# Patient Record
Sex: FEMALE | Race: WHITE | NOT HISPANIC OR LATINO | Employment: OTHER | URBAN - METROPOLITAN AREA
[De-identification: names, ages, dates, MRNs, and addresses within clinical notes are randomized per-mention and may not be internally consistent; named-entity substitution may affect disease eponyms.]

---

## 2020-04-13 LAB
HPV APTIMA: NEGATIVE
PAP RECOMMENDATION EXT: NORMAL
PAP SMEAR: NORMAL

## 2022-09-18 ENCOUNTER — OFFICE VISIT (OUTPATIENT)
Dept: URGENT CARE | Facility: CLINIC | Age: 62
End: 2022-09-18
Payer: COMMERCIAL

## 2022-09-18 VITALS
HEIGHT: 63 IN | TEMPERATURE: 98 F | RESPIRATION RATE: 18 BRPM | WEIGHT: 165 LBS | OXYGEN SATURATION: 97 % | BODY MASS INDEX: 29.23 KG/M2 | HEART RATE: 82 BPM | DIASTOLIC BLOOD PRESSURE: 93 MMHG | SYSTOLIC BLOOD PRESSURE: 160 MMHG

## 2022-09-18 DIAGNOSIS — R31.9 HEMATURIA, UNSPECIFIED TYPE: Primary | ICD-10-CM

## 2022-09-18 LAB
APPEARANCE UR: ABNORMAL
BACTERIA #/AREA URNS AUTO: ABNORMAL /HPF
BILIRUB UR QL STRIP.AUTO: NEGATIVE MG/DL
BILIRUB UR QL STRIP: NEGATIVE
COLOR UR AUTO: ABNORMAL
GLUCOSE UR QL STRIP.AUTO: NEGATIVE MG/DL
GLUCOSE UR QL STRIP: NEGATIVE
KETONES UR QL STRIP.AUTO: NEGATIVE MG/DL
KETONES UR QL STRIP: NEGATIVE
LEUKOCYTE ESTERASE UR QL STRIP.AUTO: ABNORMAL UNIT/L
LEUKOCYTE ESTERASE UR QL STRIP: POSITIVE
NITRITE UR QL STRIP.AUTO: NEGATIVE
PH UR STRIP.AUTO: 5.5 [PH]
PH, POC UA: 5
POC BLOOD, URINE: POSITIVE
POC NITRATES, URINE: NEGATIVE
PROT UR QL STRIP.AUTO: ABNORMAL MG/DL
PROT UR QL STRIP: POSITIVE
RBC #/AREA URNS AUTO: 500 /HPF
RBC UR QL AUTO: ABNORMAL UNIT/L
SP GR UR STRIP.AUTO: 1.02 (ref 1–1.03)
SP GR UR STRIP: 1.02 (ref 1–1.03)
SQUAMOUS #/AREA URNS AUTO: <5 /HPF
URATE CRY URNS QL MICRO: ABNORMAL /HPF
UROBILINOGEN UR STRIP-ACNC: 0.2 MG/DL
UROBILINOGEN UR STRIP-ACNC: 1 (ref 0.1–1.1)
WBC #/AREA URNS AUTO: 6 /HPF

## 2022-09-18 PROCEDURE — 99203 OFFICE O/P NEW LOW 30 MIN: CPT | Mod: ,,, | Performed by: PHYSICIAN ASSISTANT

## 2022-09-18 PROCEDURE — 3080F PR MOST RECENT DIASTOLIC BLOOD PRESSURE >= 90 MM HG: ICD-10-PCS | Mod: CPTII,,, | Performed by: PHYSICIAN ASSISTANT

## 2022-09-18 PROCEDURE — 1160F PR REVIEW ALL MEDS BY PRESCRIBER/CLIN PHARMACIST DOCUMENTED: ICD-10-PCS | Mod: CPTII,,, | Performed by: PHYSICIAN ASSISTANT

## 2022-09-18 PROCEDURE — 1159F PR MEDICATION LIST DOCUMENTED IN MEDICAL RECORD: ICD-10-PCS | Mod: CPTII,,, | Performed by: PHYSICIAN ASSISTANT

## 2022-09-18 PROCEDURE — 3008F BODY MASS INDEX DOCD: CPT | Mod: CPTII,,, | Performed by: PHYSICIAN ASSISTANT

## 2022-09-18 PROCEDURE — 1159F MED LIST DOCD IN RCRD: CPT | Mod: CPTII,,, | Performed by: PHYSICIAN ASSISTANT

## 2022-09-18 PROCEDURE — 3077F SYST BP >= 140 MM HG: CPT | Mod: CPTII,,, | Performed by: PHYSICIAN ASSISTANT

## 2022-09-18 PROCEDURE — 81003 POCT URINALYSIS, DIPSTICK, MANUAL, W/O SCOPE: ICD-10-PCS | Mod: QW,,, | Performed by: PHYSICIAN ASSISTANT

## 2022-09-18 PROCEDURE — 4010F ACE/ARB THERAPY RXD/TAKEN: CPT | Mod: CPTII,,, | Performed by: PHYSICIAN ASSISTANT

## 2022-09-18 PROCEDURE — 3077F PR MOST RECENT SYSTOLIC BLOOD PRESSURE >= 140 MM HG: ICD-10-PCS | Mod: CPTII,,, | Performed by: PHYSICIAN ASSISTANT

## 2022-09-18 PROCEDURE — 81003 URINALYSIS AUTO W/O SCOPE: CPT | Mod: QW,,, | Performed by: PHYSICIAN ASSISTANT

## 2022-09-18 PROCEDURE — 99203 PR OFFICE/OUTPT VISIT, NEW, LEVL III, 30-44 MIN: ICD-10-PCS | Mod: ,,, | Performed by: PHYSICIAN ASSISTANT

## 2022-09-18 PROCEDURE — 4010F PR ACE/ARB THEARPY RXD/TAKEN: ICD-10-PCS | Mod: CPTII,,, | Performed by: PHYSICIAN ASSISTANT

## 2022-09-18 PROCEDURE — 1160F RVW MEDS BY RX/DR IN RCRD: CPT | Mod: CPTII,,, | Performed by: PHYSICIAN ASSISTANT

## 2022-09-18 PROCEDURE — 3080F DIAST BP >= 90 MM HG: CPT | Mod: CPTII,,, | Performed by: PHYSICIAN ASSISTANT

## 2022-09-18 PROCEDURE — 3008F PR BODY MASS INDEX (BMI) DOCUMENTED: ICD-10-PCS | Mod: CPTII,,, | Performed by: PHYSICIAN ASSISTANT

## 2022-09-18 RX ORDER — OMEPRAZOLE 20 MG/1
20 CAPSULE, DELAYED RELEASE ORAL DAILY
COMMUNITY
Start: 2022-06-19 | End: 2023-01-18 | Stop reason: SDUPTHER

## 2022-09-18 RX ORDER — TAMSULOSIN HYDROCHLORIDE 0.4 MG/1
0.4 CAPSULE ORAL DAILY
Qty: 14 CAPSULE | Refills: 0 | Status: SHIPPED | OUTPATIENT
Start: 2022-09-18 | End: 2022-09-18

## 2022-09-18 RX ORDER — ATORVASTATIN CALCIUM 40 MG/1
40 TABLET, FILM COATED ORAL DAILY
COMMUNITY
Start: 2022-08-09 | End: 2023-01-09 | Stop reason: SDUPTHER

## 2022-09-18 RX ORDER — NITROFURANTOIN 25; 75 MG/1; MG/1
100 CAPSULE ORAL 2 TIMES DAILY
Qty: 20 CAPSULE | Refills: 0 | Status: SHIPPED | OUTPATIENT
Start: 2022-09-18 | End: 2022-09-28

## 2022-09-18 RX ORDER — KETOROLAC TROMETHAMINE 10 MG/1
10 TABLET, FILM COATED ORAL EVERY 6 HOURS PRN
Qty: 12 TABLET | Refills: 0 | Status: SHIPPED | OUTPATIENT
Start: 2022-09-18 | End: 2022-09-21

## 2022-09-18 RX ORDER — LOSARTAN POTASSIUM 50 MG/1
50 TABLET ORAL DAILY
COMMUNITY
Start: 2022-07-08 | End: 2022-12-08

## 2022-09-18 RX ORDER — KETOROLAC TROMETHAMINE 10 MG/1
10 TABLET, FILM COATED ORAL EVERY 6 HOURS PRN
Qty: 12 TABLET | Refills: 0 | Status: SHIPPED | OUTPATIENT
Start: 2022-09-18 | End: 2022-09-18

## 2022-09-18 RX ORDER — IBUPROFEN 800 MG/1
800 TABLET ORAL 3 TIMES DAILY PRN
Status: ON HOLD | COMMUNITY
Start: 2022-08-09 | End: 2023-02-23 | Stop reason: HOSPADM

## 2022-09-18 RX ORDER — NITROFURANTOIN 25; 75 MG/1; MG/1
100 CAPSULE ORAL 2 TIMES DAILY
Qty: 20 CAPSULE | Refills: 0 | Status: SHIPPED | OUTPATIENT
Start: 2022-09-18 | End: 2022-09-18

## 2022-09-18 RX ORDER — TAMSULOSIN HYDROCHLORIDE 0.4 MG/1
0.4 CAPSULE ORAL DAILY
Qty: 14 CAPSULE | Refills: 0 | Status: SHIPPED | OUTPATIENT
Start: 2022-09-18 | End: 2022-12-08

## 2022-09-18 NOTE — PROGRESS NOTES
"Subjective:       Patient ID: Nehal Araujo is a 61 y.o. female.    Vitals:  height is 5' 3" (1.6 m) and weight is 74.8 kg (165 lb). Her oral temperature is 98.4 °F (36.9 °C). Her blood pressure is 160/93 (abnormal) and her pulse is 82. Her respiration is 18 and oxygen saturation is 97%.     Chief Complaint: Urinary Tract Infection (Hematuria and flank pain x 2 days.)    HPI  Female with nontraumatic right mid back pain onset yesterday states having right lower back pain and hematuria today presents to urgent care for initial evaluation.     Additional comments: Hematuria and flank pain x onset yesterday    Flank Pain  This is a new problem. The current episode started yesterday. The problem has been waxing and waning since onset. The pain is present in the lumbar spine. The symptoms are aggravated by urinating. Pertinent negatives include no abdominal pain, bladder incontinence, dysuria, fever, numbness, pelvic pain or tingling.     Constitution: Negative for chills and fever.   Gastrointestinal:  Negative for abdominal pain, vomiting and diarrhea.   Genitourinary:  Positive for flank pain and hematuria. Negative for dysuria, frequency, urine decreased, bladder incontinence and pelvic pain.   Musculoskeletal:  Positive for pain and back pain. Negative for trauma, muscle cramps and muscle ache.   Skin: Negative.    Neurological:  Negative for numbness and tingling.   Psychiatric/Behavioral: Negative.       Objective:      Physical Exam   Constitutional: She is oriented to person, place, and time. She appears well-developed. She is cooperative. No distress.      Comments:Awake alert smiling pleasant ambulatory female     HENT:   Head: Normocephalic.   Mouth/Throat: Oropharynx is clear and moist and mucous membranes are normal.   Eyes: Conjunctivae and lids are normal.   Neck: Trachea normal and phonation normal. Neck supple.   Cardiovascular: Normal rate, regular rhythm, normal heart sounds and normal pulses. "   Pulmonary/Chest: Effort normal and breath sounds normal. No respiratory distress. She has no wheezes.   Abdominal: Normal appearance and bowel sounds are normal. Soft. There is no abdominal tenderness. There is no guarding, no left CVA tenderness and no right CVA tenderness.   Musculoskeletal: Normal range of motion.         General: Normal range of motion.   Neurological: no focal deficit. She is alert and oriented to person, place, and time. She has normal strength and normal reflexes. No sensory deficit.   Skin: Skin is warm, dry, intact, not diaphoretic and no rash.         Comments: No blisters no vesicles no shingles   Psychiatric: Her speech is normal and behavior is normal. Mood, judgment and thought content normal.   Nursing note and vitals reviewed.         Previous History      Review of patient's allergies indicates:  No Known Allergies    Past Medical History:   Diagnosis Date    Hyperlipidemia     Hypertension      Current Outpatient Medications   Medication Instructions    atorvastatin (LIPITOR) 40 mg, Oral, Daily    ibuprofen (ADVIL,MOTRIN) 800 mg, Oral, 3 times daily PRN    ketorolac (TORADOL) 10 mg, Oral, Every 6 hours PRN    losartan (COZAAR) 50 mg, Oral, Daily    nitrofurantoin, macrocrystal-monohydrate, (MACROBID) 100 MG capsule 100 mg, Oral, 2 times daily    omeprazole (PRILOSEC) 20 mg, Oral, Daily    tamsulosin (FLOMAX) 0.4 mg, Oral, Daily     Past Surgical History:   Procedure Laterality Date    CHOLECYSTECTOMY      colonectomy      ENDOMETRIAL ABLATION      thumb surgery      TONSILLECTOMY       Family History   Problem Relation Age of Onset    Diabetes Mother     Breast cancer Mother     Heart disease Mother        Social History     Tobacco Use    Smoking status: Former     Types: Cigarettes    Smokeless tobacco: Never   Substance Use Topics    Alcohol use: Yes     Alcohol/week: 5.0 standard drinks     Types: 5 Drinks containing 0.5 oz of alcohol per week        Physical Exam      Vital  "Signs Reviewed   BP (!) 160/93 (BP Location: Right arm)   Pulse 82   Temp 98.4 °F (36.9 °C) (Oral)   Resp 18   Ht 5' 3" (1.6 m)   Wt 74.8 kg (165 lb)   SpO2 97%   BMI 29.23 kg/m²        Procedures    Procedures     Labs     Results for orders placed or performed in visit on 09/18/22   POCT Urinalysis, Dipstick, Manual, W/O Scope   Result Value Ref Range    POC Blood, Urine Positive (A) Negative    POC Bilirubin, Urine Negative Negative    POC Urobilinogen, Urine 1.0 0.1 - 1.1    POC Ketones, Urine Negative Negative    POC Protein, Urine Positive (A) Negative    POC Nitrates, Urine Negative Negative    POC Glucose, Urine Negative Negative    pH, UA 5     POC Specific Gravity, Urine 1.025 1.003 - 1.029    POC Leukocytes, Urine Positive (A) Negative       Assessment:       1. Hematuria, unspecified type            Plan:     Patient understands urinalysis results concern for possible ureteral stone versus UTI.  Patient desires discharge with symptomatic Rx and follow-up if the stent emergency department evaluation sooner if worsens.    Recommend water and noncarbonated fluids.  Alternate Tylenol and ibuprofen or Toradol every 6-8 hours as needed for pain or inflammation.  Recommend Macrobid coverage.  Recommend Flomax daily to aid urine output.  Recommend follow-up with primary care physician in 3-5 days for re-evaluation if not improving to the concern for acute flank pain hematuria potential ureteral stone vs UTI.  Recommended emergency department evaluation sooner if flank pain worsens  Hematuria, unspecified type  -     POCT Urinalysis, Dipstick, Manual, W/O Scope  -     tamsulosin (FLOMAX) 0.4 mg Cap; Take 1 capsule (0.4 mg total) by mouth once daily. for 14 days  Dispense: 14 capsule; Refill: 0  -     ketorolac (TORADOL) 10 mg tablet; Take 1 tablet (10 mg total) by mouth every 6 (six) hours as needed for Pain.  Dispense: 12 tablet; Refill: 0  -     nitrofurantoin, macrocrystal-monohydrate, (MACROBID) 100 MG " capsule; Take 1 capsule (100 mg total) by mouth 2 (two) times daily. for 10 days  Dispense: 20 capsule; Refill: 0  -     Urinalysis, Reflex to Urine Culture Urine, Clean Catch

## 2022-09-18 NOTE — PATIENT INSTRUCTIONS
Recommend water and noncarbonated fluids.  Alternate Tylenol and ibuprofen or Toradol every 6-8 hours as needed for pain or inflammation.  Recommend Macrobid coverage.  Recommend Flomax daily to aid urine output.  Recommend follow-up with primary care physician in 3-5 days for re-evaluation if not improving to the concern for acute flank pain hematuria potential ureteral stone vs UTI.  Recommended emergency department evaluation sooner if flank pain worsens

## 2022-09-20 ENCOUNTER — TELEPHONE (OUTPATIENT)
Dept: URGENT CARE | Facility: CLINIC | Age: 62
End: 2022-09-20
Payer: COMMERCIAL

## 2022-10-25 ENCOUNTER — OFFICE VISIT (OUTPATIENT)
Dept: URGENT CARE | Facility: CLINIC | Age: 62
End: 2022-10-25
Payer: COMMERCIAL

## 2022-10-25 ENCOUNTER — HOSPITAL ENCOUNTER (EMERGENCY)
Facility: HOSPITAL | Age: 62
Discharge: HOME | End: 2022-10-25
Attending: EMERGENCY MEDICINE
Payer: COMMERCIAL

## 2022-10-25 VITALS
BODY MASS INDEX: 29.23 KG/M2 | HEART RATE: 85 BPM | RESPIRATION RATE: 18 BRPM | SYSTOLIC BLOOD PRESSURE: 186 MMHG | WEIGHT: 165 LBS | DIASTOLIC BLOOD PRESSURE: 105 MMHG | TEMPERATURE: 99 F | HEIGHT: 63 IN | OXYGEN SATURATION: 99 %

## 2022-10-25 VITALS
SYSTOLIC BLOOD PRESSURE: 156 MMHG | BODY MASS INDEX: 29.23 KG/M2 | WEIGHT: 165 LBS | RESPIRATION RATE: 20 BRPM | TEMPERATURE: 98 F | HEIGHT: 63 IN | OXYGEN SATURATION: 98 % | HEART RATE: 88 BPM | DIASTOLIC BLOOD PRESSURE: 89 MMHG

## 2022-10-25 DIAGNOSIS — M25.461 PAIN AND SWELLING OF RIGHT KNEE: Primary | ICD-10-CM

## 2022-10-25 DIAGNOSIS — M79.89 SWELLING OF RIGHT FOOT: ICD-10-CM

## 2022-10-25 DIAGNOSIS — R20.2 PARESTHESIA: ICD-10-CM

## 2022-10-25 DIAGNOSIS — M25.561 PAIN AND SWELLING OF RIGHT KNEE: Primary | ICD-10-CM

## 2022-10-25 DIAGNOSIS — M79.661 RIGHT CALF PAIN: ICD-10-CM

## 2022-10-25 DIAGNOSIS — M79.604 RIGHT LEG PAIN: Primary | ICD-10-CM

## 2022-10-25 LAB
ALBUMIN SERPL-MCNC: 4.2 GM/DL (ref 3.4–4.8)
ALBUMIN/GLOB SERPL: 1.2 RATIO (ref 1.1–2)
ALP SERPL-CCNC: 67 UNIT/L (ref 40–150)
ALT SERPL-CCNC: 37 UNIT/L (ref 0–55)
AST SERPL-CCNC: 29 UNIT/L (ref 5–34)
BASOPHILS # BLD AUTO: 0.05 X10(3)/MCL (ref 0–0.2)
BASOPHILS NFR BLD AUTO: 0.9 %
BILIRUBIN DIRECT+TOT PNL SERPL-MCNC: 0.5 MG/DL
BUN SERPL-MCNC: 18.6 MG/DL (ref 9.8–20.1)
CALCIUM SERPL-MCNC: 9.2 MG/DL (ref 8.4–10.2)
CHLORIDE SERPL-SCNC: 105 MMOL/L (ref 98–107)
CO2 SERPL-SCNC: 25 MMOL/L (ref 23–31)
CREAT SERPL-MCNC: 0.83 MG/DL (ref 0.55–1.02)
D DIMER PPP IA.FEU-MCNC: <=0.27 UG/ML FEU (ref 0–0.5)
EOSINOPHIL # BLD AUTO: 0.12 X10(3)/MCL (ref 0–0.9)
EOSINOPHIL NFR BLD AUTO: 2.1 %
ERYTHROCYTE [DISTWIDTH] IN BLOOD BY AUTOMATED COUNT: 12.1 % (ref 11.5–17)
GFR SERPLBLD CREATININE-BSD FMLA CKD-EPI: >60 MLS/MIN/1.73/M2
GLOBULIN SER-MCNC: 3.4 GM/DL (ref 2.4–3.5)
GLUCOSE SERPL-MCNC: 104 MG/DL (ref 82–115)
HCT VFR BLD AUTO: 35.9 % (ref 37–47)
HGB BLD-MCNC: 12.2 GM/DL (ref 12–16)
IMM GRANULOCYTES # BLD AUTO: 0.02 X10(3)/MCL (ref 0–0.04)
IMM GRANULOCYTES NFR BLD AUTO: 0.4 %
LYMPHOCYTES # BLD AUTO: 2.44 X10(3)/MCL (ref 0.6–4.6)
LYMPHOCYTES NFR BLD AUTO: 43.3 %
MCH RBC QN AUTO: 32.7 PG (ref 27–31)
MCHC RBC AUTO-ENTMCNC: 34 MG/DL (ref 33–36)
MCV RBC AUTO: 96.2 FL (ref 80–94)
MONOCYTES # BLD AUTO: 0.57 X10(3)/MCL (ref 0.1–1.3)
MONOCYTES NFR BLD AUTO: 10.1 %
NEUTROPHILS # BLD AUTO: 2.4 X10(3)/MCL (ref 2.1–9.2)
NEUTROPHILS NFR BLD AUTO: 43.2 %
NRBC BLD AUTO-RTO: 0 %
PLATELET # BLD AUTO: 242 X10(3)/MCL (ref 130–400)
PMV BLD AUTO: 8.9 FL (ref 7.4–10.4)
POTASSIUM SERPL-SCNC: 3.7 MMOL/L (ref 3.5–5.1)
PROT SERPL-MCNC: 7.6 GM/DL (ref 5.8–7.6)
RBC # BLD AUTO: 3.73 X10(6)/MCL (ref 4.2–5.4)
SODIUM SERPL-SCNC: 143 MMOL/L (ref 136–145)
WBC # SPEC AUTO: 5.6 X10(3)/MCL (ref 4.5–11.5)

## 2022-10-25 PROCEDURE — 1159F MED LIST DOCD IN RCRD: CPT | Mod: CPTII,,, | Performed by: PHYSICIAN ASSISTANT

## 2022-10-25 PROCEDURE — 3080F PR MOST RECENT DIASTOLIC BLOOD PRESSURE >= 90 MM HG: ICD-10-PCS | Mod: CPTII,,, | Performed by: PHYSICIAN ASSISTANT

## 2022-10-25 PROCEDURE — 36415 COLL VENOUS BLD VENIPUNCTURE: CPT | Performed by: EMERGENCY MEDICINE

## 2022-10-25 PROCEDURE — 93010 ELECTROCARDIOGRAM REPORT: CPT | Mod: ,,, | Performed by: INTERNAL MEDICINE

## 2022-10-25 PROCEDURE — 4010F PR ACE/ARB THEARPY RXD/TAKEN: ICD-10-PCS | Mod: CPTII,,, | Performed by: PHYSICIAN ASSISTANT

## 2022-10-25 PROCEDURE — 80053 COMPREHEN METABOLIC PANEL: CPT | Performed by: EMERGENCY MEDICINE

## 2022-10-25 PROCEDURE — 1160F PR REVIEW ALL MEDS BY PRESCRIBER/CLIN PHARMACIST DOCUMENTED: ICD-10-PCS | Mod: CPTII,,, | Performed by: PHYSICIAN ASSISTANT

## 2022-10-25 PROCEDURE — 1159F PR MEDICATION LIST DOCUMENTED IN MEDICAL RECORD: ICD-10-PCS | Mod: CPTII,,, | Performed by: PHYSICIAN ASSISTANT

## 2022-10-25 PROCEDURE — 3077F PR MOST RECENT SYSTOLIC BLOOD PRESSURE >= 140 MM HG: ICD-10-PCS | Mod: CPTII,,, | Performed by: PHYSICIAN ASSISTANT

## 2022-10-25 PROCEDURE — 85379 FIBRIN DEGRADATION QUANT: CPT | Performed by: EMERGENCY MEDICINE

## 2022-10-25 PROCEDURE — 96376 TX/PRO/DX INJ SAME DRUG ADON: CPT

## 2022-10-25 PROCEDURE — 63600175 PHARM REV CODE 636 W HCPCS: Performed by: EMERGENCY MEDICINE

## 2022-10-25 PROCEDURE — 63600175 PHARM REV CODE 636 W HCPCS: Performed by: NURSE PRACTITIONER

## 2022-10-25 PROCEDURE — 99285 EMERGENCY DEPT VISIT HI MDM: CPT | Mod: 25

## 2022-10-25 PROCEDURE — 25000003 PHARM REV CODE 250: Performed by: NURSE PRACTITIONER

## 2022-10-25 PROCEDURE — 96374 THER/PROPH/DIAG INJ IV PUSH: CPT

## 2022-10-25 PROCEDURE — 4010F ACE/ARB THERAPY RXD/TAKEN: CPT | Mod: CPTII,,, | Performed by: PHYSICIAN ASSISTANT

## 2022-10-25 PROCEDURE — 1160F RVW MEDS BY RX/DR IN RCRD: CPT | Mod: CPTII,,, | Performed by: PHYSICIAN ASSISTANT

## 2022-10-25 PROCEDURE — 99202 OFFICE O/P NEW SF 15 MIN: CPT | Mod: ,,, | Performed by: PHYSICIAN ASSISTANT

## 2022-10-25 PROCEDURE — 99202 PR OFFICE/OUTPT VISIT, NEW, LEVL II, 15-29 MIN: ICD-10-PCS | Mod: ,,, | Performed by: PHYSICIAN ASSISTANT

## 2022-10-25 PROCEDURE — 85025 COMPLETE CBC W/AUTO DIFF WBC: CPT | Performed by: EMERGENCY MEDICINE

## 2022-10-25 PROCEDURE — 93005 ELECTROCARDIOGRAM TRACING: CPT

## 2022-10-25 PROCEDURE — 96375 TX/PRO/DX INJ NEW DRUG ADDON: CPT

## 2022-10-25 PROCEDURE — 3077F SYST BP >= 140 MM HG: CPT | Mod: CPTII,,, | Performed by: PHYSICIAN ASSISTANT

## 2022-10-25 PROCEDURE — 3080F DIAST BP >= 90 MM HG: CPT | Mod: CPTII,,, | Performed by: PHYSICIAN ASSISTANT

## 2022-10-25 PROCEDURE — 93010 EKG 12-LEAD: ICD-10-PCS | Mod: ,,, | Performed by: INTERNAL MEDICINE

## 2022-10-25 RX ORDER — HYDRALAZINE HYDROCHLORIDE 20 MG/ML
10 INJECTION INTRAMUSCULAR; INTRAVENOUS
Status: COMPLETED | OUTPATIENT
Start: 2022-10-25 | End: 2022-10-25

## 2022-10-25 RX ORDER — METHYLPREDNISOLONE SOD SUCC 125 MG
80 VIAL (EA) INJECTION
Status: COMPLETED | OUTPATIENT
Start: 2022-10-25 | End: 2022-10-25

## 2022-10-25 RX ORDER — CLONIDINE HYDROCHLORIDE 0.1 MG/1
0.1 TABLET ORAL 2 TIMES DAILY PRN
Qty: 30 TABLET | Refills: 0 | Status: SHIPPED | OUTPATIENT
Start: 2022-10-25 | End: 2022-10-25 | Stop reason: SDUPTHER

## 2022-10-25 RX ORDER — LABETALOL HYDROCHLORIDE 5 MG/ML
20 INJECTION, SOLUTION INTRAVENOUS
Status: COMPLETED | OUTPATIENT
Start: 2022-10-25 | End: 2022-10-25

## 2022-10-25 RX ORDER — CLONIDINE HYDROCHLORIDE 0.1 MG/1
0.1 TABLET ORAL 2 TIMES DAILY PRN
Qty: 30 TABLET | Refills: 0 | Status: SHIPPED | OUTPATIENT
Start: 2022-10-25 | End: 2022-12-08 | Stop reason: ALTCHOICE

## 2022-10-25 RX ADMIN — HYDRALAZINE HYDROCHLORIDE 10 MG: 20 INJECTION INTRAMUSCULAR; INTRAVENOUS at 08:10

## 2022-10-25 RX ADMIN — HYDRALAZINE HYDROCHLORIDE 10 MG: 20 INJECTION INTRAMUSCULAR; INTRAVENOUS at 07:10

## 2022-10-25 RX ADMIN — LABETALOL HYDROCHLORIDE 20 MG: 5 INJECTION, SOLUTION INTRAVENOUS at 09:10

## 2022-10-25 RX ADMIN — METHYLPREDNISOLONE SODIUM SUCCINATE 80 MG: 125 INJECTION, POWDER, FOR SOLUTION INTRAMUSCULAR; INTRAVENOUS at 09:10

## 2022-10-25 NOTE — PROGRESS NOTES
"Subjective:       Patient ID: Nehal Araujo is a 62 y.o. female.    Vitals:  height is 5' 3" (1.6 m) and weight is 74.8 kg (165 lb). Her temperature is 99.1 °F (37.3 °C). Her blood pressure is 186/105 (abnormal) and her pulse is 85. Her respiration is 18 and oxygen saturation is 99%.     Chief Complaint: Knee Pain        Patient is a 62-year-old female who complains of pain in the right lower extremity knee starting from the lateral aspect the knee extending into the lateral and posterior aspects of the calf.  She denies any specific trauma.  She has noted some significant swelling into the ankle and foot.  Denies any lower back pain or pain radiating into the lateral foot.  Denies any rash or erythema.  Patient states that it does not feel like bone pain.  She is without relief from meloxicam x2 weeks.    Knee Pain   ROS    Objective:      Physical Exam   HENT:   Head: Normocephalic and atraumatic.   Nose: Nose normal.   Neck: Neck supple.   Pulmonary/Chest: Effort normal.   Abdominal: Normal appearance.   Musculoskeletal: Normal range of motion.         General: Swelling present. No tenderness. Normal range of motion.      Right lower leg: Edema present.   Neurological: She is alert.   Skin: Skin is no rash. No lesion         Right lower extremity:  No appreciated TTP throughout the thigh and knee lower leg ankle and foot.  There is noted edema in the ankle and foot.  Patient has full range of motion throughout the knee ankle and foot.  Capillary refill brisk distally      I am concerned for the possibility of a DVT secondary to there not being any specific trauma to the area as well as the lower extremity pain and edema.  Patient will be sent to the ER for further evaluation.          Assessment:       1. Right leg pain    2. Swelling of right foot          Plan:         Right leg pain    Swelling of right foot         As discussed, it is recommended that you present to the ER now for further evaluation to prevent a " delay in care.

## 2022-10-25 NOTE — ED PROVIDER NOTES
"Encounter Date: 10/25/2022       History     Chief Complaint   Patient presents with    Knee Pain     C/o pain to knee x "a couple of weeks". Denies injury. Went to  and states they sent her here for an US.      61 y/o female presents with right knee (posterior) and proximal lower leg pain/swelling. Nontraumatic. Pain for 2 weeks now. Seen at urgent care PTA and sent here to r/o DVT. No redness. Pain doesn't hurt when you palpate it just with walking and certain movements.     The history is provided by the patient. No  was used.   Knee Pain  This is a new problem. Episode onset: 2 weeks. The problem occurs daily. The problem has not changed since onset.The symptoms are aggravated by walking. She has tried nothing for the symptoms. The treatment provided no relief.   Review of patient's allergies indicates:  No Known Allergies  Past Medical History:   Diagnosis Date    Hyperlipidemia     Hypertension      Past Surgical History:   Procedure Laterality Date    CHOLECYSTECTOMY      colonectomy      ENDOMETRIAL ABLATION      thumb surgery      TONSILLECTOMY       Family History   Problem Relation Age of Onset    Diabetes Mother     Breast cancer Mother     Heart disease Mother      Social History     Tobacco Use    Smoking status: Former     Types: Cigarettes    Smokeless tobacco: Never   Substance Use Topics    Alcohol use: Yes     Alcohol/week: 5.0 standard drinks     Types: 5 Drinks containing 0.5 oz of alcohol per week     Review of Systems   Musculoskeletal:         Right posterior knee/lower leg pain/swelling for 2 weeks   All other systems reviewed and are negative.    Physical Exam     Initial Vitals   BP Pulse Resp Temp SpO2   10/25/22 1836 10/25/22 1835 10/25/22 1835 10/25/22 1835 10/25/22 1835   (!) 195/105 87 18 98.2 °F (36.8 °C) 99 %      MAP       --                Physical Exam    Nursing note and vitals reviewed.  Constitutional: She appears well-developed and well-nourished. "   Neck:   Normal range of motion.  Cardiovascular:  Normal rate, regular rhythm and intact distal pulses.           Pulmonary/Chest: No respiratory distress.   Musculoskeletal:      Cervical back: Normal range of motion.      Right knee: Swelling present. No deformity or erythema. Normal range of motion. Tenderness present. No medial joint line tenderness.      Left knee: Normal.      Comments: Right knee and proximal lower leg swelling, pain. No discoloration. No redness. Palpation does not make it worse. Walking does create pain. +yoanna's sign    All other adjacent joints otherwise normal       Neurological: She is alert and oriented to person, place, and time. She has normal strength.   Skin: Skin is warm and dry.   Psychiatric: She has a normal mood and affect.       ED Course   Procedures  Labs Reviewed   CBC WITH DIFFERENTIAL - Abnormal; Notable for the following components:       Result Value    RBC 3.73 (*)     Hct 35.9 (*)     MCV 96.2 (*)     MCH 32.7 (*)     All other components within normal limits   D DIMER, QUANTITATIVE - Normal   CBC W/ AUTO DIFFERENTIAL    Narrative:     The following orders were created for panel order CBC auto differential.  Procedure                               Abnormality         Status                     ---------                               -----------         ------                     CBC with Differential[[id number]]        Abnormal            Final result                 Please view results for these tests on the individual orders.   COMPREHENSIVE METABOLIC PANEL          Imaging Results              CT Head Without Contrast (Final result)  Result time 10/25/22 19:46:18      Final result by Dorene Mckenzie MD (10/25/22 19:46:18)                   Impression:      No appreciable acute intracranial abnormality.    Periventricular and subcortical white matter changes most compatible with chronic small vessel ischemic disease.      Electronically signed by: Dorene  Jonah  Date:    10/25/2022  Time:    19:46               Narrative:    EXAMINATION:  CT HEAD WITHOUT CONTRAST    CLINICAL HISTORY:  Neuro deficit, acute, stroke suspected;    TECHNIQUE:  Low dose axial CT images obtained throughout the head without intravenous contrast.  Axial, sagittal and coronal reconstructions were performed and interpreted.    DLP: 862 mGycm    All CT scans at this location are performed using dose optimization techniques as appropriate to a performed exam including the following automated exposure control, adjustment of the mA and/or kV according to patient size and/or use of iterative reconstruction technique    COMPARISON:  No relevant prior available for comparison.    FINDINGS:  BRAIN: Gray white differentiation is maintained. Periventricular and subcortical white matter changes most compatible with chronic small vessel ischemic disease.  No hemorrhage. No edema. No mass effect or midline shift.  The posterior fossa and midline structures are unremarkable.    VENTRICLES: Normal in size and configuration.    EXTRA-AXIAL: No abnormal extra-axial collections.    BONES: Calvarium is intact.    SINUSES AND MASTOIDS: Visualized paranasal sinuses and mastoid air cells are clear.                                       Medications   hydrALAZINE injection 10 mg (10 mg Intravenous Given 10/25/22 1928)   hydrALAZINE injection 10 mg (10 mg Intravenous Given 10/25/22 2015)   labetaloL injection 20 mg (20 mg Intravenous Given 10/25/22 2135)   methylPREDNISolone sodium succinate injection 80 mg (80 mg Intravenous Given 10/25/22 2135)     Medical Decision Making:   ED Management:  61 y/o female presents with 2 week history of right posterior knee pain and swelling with pain and swelling to proximal lower leg as well. Nontrauamtic. No redness. Pain no reproducible with ambulation. +homans sign. Sent here from urgent care to r/o DVT. She states they measured her calf and right is larger. Her bp is noted to  be elevated and she c/o some paresthesia when DR. Ngo went in the room. No cp, no dizziness, no headache, no change in vision. Labs normal. Ct head no acute findings. D dimer negative --> no DVT. Will not need to get CV US of RLE. Discussed possibilities of baker's cyst vs tendinopathy. Will treat with dose of steroids. She is going to adjust her losartan to 75 mg per day and will give clonidine prn. She is agreeable with this plan   Additional MDM:   Differential Diagnosis:   Other: The following diagnoses were also considered and will be evaluated: DVT, knee strain.                       Clinical Impression:   Final diagnoses:  [M25.561, M25.461] Pain and swelling of right knee (Primary)  [M79.661] Right calf pain  [R20.2] Paresthesia      ED Disposition Condition    Discharge Stable          ED Prescriptions       Medication Sig Dispense Start Date End Date Auth. Provider    cloNIDine (CATAPRES) 0.1 MG tablet Take 1 tablet (0.1 mg total) by mouth 2 (two) times daily as needed (blood pressure above 185/90). 30 tablet 10/25/2022 -- MICHELLE Denney          Follow-up Information       Follow up With Specialties Details Why Contact Info    primary care provider  Call in 1 week               MICHELLE Denney  10/25/22 1911       MICHELLE Denney  10/25/22 [id number]

## 2022-10-26 NOTE — PROVIDER PROGRESS NOTES - EMERGENCY DEPT.
Encounter Date: 10/25/2022    ED Physician Progress Notes        Physician Note:   I am seeing this 62-year-old female in conjunction with Ricarda who is the mid-level taking care of her today.  She was sent here from urgent care for evaluation of leg pain and to rule out a DVT.  She states she recently moved here from [place] where her doctor decreased her losartan from 75 mg a day to 50 mg a day.  She states she gained some weight when she moved to [place] and that her blood pressure is higher today than it typically runs.  She says however that her systolic blood pressure is commonly in the 180s.  She states she has had some paresthesias to her right nasal region as well as her right upper lip has been present for the past 2 days.  Otherwise she is not having any visual changes weakness numbness difficulty swallowing or speaking.  She has no headache.  She says she is compliant with her medications.  On exam cranial nerves 2-12 were intact she has no focal motor or sensory deficit peripherally.

## 2022-10-26 NOTE — DISCHARGE INSTRUCTIONS
Take your losartan and increase dosage to 75mg per day. Take clonidine as needed if blood pressure above 185/90 only as needed. Lots of water. Watch salt intake. Take your meloxicam for your knee. If you develop chest pain, headache, dizziness or worsening blood pressure return to ER

## 2022-12-08 ENCOUNTER — OFFICE VISIT (OUTPATIENT)
Dept: PRIMARY CARE CLINIC | Facility: CLINIC | Age: 62
End: 2022-12-08
Payer: COMMERCIAL

## 2022-12-08 VITALS
HEIGHT: 63 IN | OXYGEN SATURATION: 20 % | DIASTOLIC BLOOD PRESSURE: 89 MMHG | WEIGHT: 173 LBS | RESPIRATION RATE: 20 BRPM | TEMPERATURE: 98 F | BODY MASS INDEX: 30.65 KG/M2 | HEART RATE: 98 BPM | SYSTOLIC BLOOD PRESSURE: 157 MMHG

## 2022-12-08 DIAGNOSIS — M54.16 LUMBAR BACK PAIN WITH RADICULOPATHY AFFECTING RIGHT LOWER EXTREMITY: ICD-10-CM

## 2022-12-08 DIAGNOSIS — R73.03 PRE-DIABETES: ICD-10-CM

## 2022-12-08 DIAGNOSIS — I10 PRIMARY HYPERTENSION: ICD-10-CM

## 2022-12-08 DIAGNOSIS — Z12.4 CERVICAL CANCER SCREENING: ICD-10-CM

## 2022-12-08 DIAGNOSIS — D64.9 LOW HEMATOCRIT: ICD-10-CM

## 2022-12-08 DIAGNOSIS — E66.9 CLASS 1 OBESITY WITH SERIOUS COMORBIDITY AND BODY MASS INDEX (BMI) OF 30.0 TO 30.9 IN ADULT, UNSPECIFIED OBESITY TYPE: ICD-10-CM

## 2022-12-08 DIAGNOSIS — Z23 NEED FOR INFLUENZA VACCINATION: ICD-10-CM

## 2022-12-08 DIAGNOSIS — D75.89 MACROCYTOSIS: ICD-10-CM

## 2022-12-08 DIAGNOSIS — Z76.89 ENCOUNTER TO ESTABLISH CARE WITH NEW DOCTOR: Primary | ICD-10-CM

## 2022-12-08 DIAGNOSIS — G89.29 CHRONIC PAIN OF RIGHT KNEE: ICD-10-CM

## 2022-12-08 DIAGNOSIS — M25.561 CHRONIC PAIN OF RIGHT KNEE: ICD-10-CM

## 2022-12-08 DIAGNOSIS — E78.2 MIXED HYPERLIPIDEMIA: ICD-10-CM

## 2022-12-08 DIAGNOSIS — Z12.31 SCREENING MAMMOGRAM FOR BREAST CANCER: ICD-10-CM

## 2022-12-08 PROBLEM — E66.811 CLASS 1 OBESITY WITH SERIOUS COMORBIDITY AND BODY MASS INDEX (BMI) OF 30.0 TO 30.9 IN ADULT: Status: ACTIVE | Noted: 2022-12-08

## 2022-12-08 PROBLEM — Z87.39 HISTORY OF GOUT: Status: ACTIVE | Noted: 2022-12-08

## 2022-12-08 PROCEDURE — 99204 PR OFFICE/OUTPT VISIT, NEW, LEVL IV, 45-59 MIN: ICD-10-PCS | Mod: 25,,, | Performed by: STUDENT IN AN ORGANIZED HEALTH CARE EDUCATION/TRAINING PROGRAM

## 2022-12-08 PROCEDURE — 3077F SYST BP >= 140 MM HG: CPT | Mod: CPTII,,, | Performed by: STUDENT IN AN ORGANIZED HEALTH CARE EDUCATION/TRAINING PROGRAM

## 2022-12-08 PROCEDURE — 99204 OFFICE O/P NEW MOD 45 MIN: CPT | Mod: 25,,, | Performed by: STUDENT IN AN ORGANIZED HEALTH CARE EDUCATION/TRAINING PROGRAM

## 2022-12-08 PROCEDURE — 4010F PR ACE/ARB THEARPY RXD/TAKEN: ICD-10-PCS | Mod: CPTII,,, | Performed by: STUDENT IN AN ORGANIZED HEALTH CARE EDUCATION/TRAINING PROGRAM

## 2022-12-08 PROCEDURE — 90686 IIV4 VACC NO PRSV 0.5 ML IM: CPT | Mod: ,,, | Performed by: STUDENT IN AN ORGANIZED HEALTH CARE EDUCATION/TRAINING PROGRAM

## 2022-12-08 PROCEDURE — 90471 FLU VACCINE (QUAD) GREATER THAN OR EQUAL TO 3YO PRESERVATIVE FREE IM: ICD-10-PCS | Mod: ,,, | Performed by: STUDENT IN AN ORGANIZED HEALTH CARE EDUCATION/TRAINING PROGRAM

## 2022-12-08 PROCEDURE — 3079F DIAST BP 80-89 MM HG: CPT | Mod: CPTII,,, | Performed by: STUDENT IN AN ORGANIZED HEALTH CARE EDUCATION/TRAINING PROGRAM

## 2022-12-08 PROCEDURE — 3077F PR MOST RECENT SYSTOLIC BLOOD PRESSURE >= 140 MM HG: ICD-10-PCS | Mod: CPTII,,, | Performed by: STUDENT IN AN ORGANIZED HEALTH CARE EDUCATION/TRAINING PROGRAM

## 2022-12-08 PROCEDURE — 1159F PR MEDICATION LIST DOCUMENTED IN MEDICAL RECORD: ICD-10-PCS | Mod: CPTII,,, | Performed by: STUDENT IN AN ORGANIZED HEALTH CARE EDUCATION/TRAINING PROGRAM

## 2022-12-08 PROCEDURE — 3079F PR MOST RECENT DIASTOLIC BLOOD PRESSURE 80-89 MM HG: ICD-10-PCS | Mod: CPTII,,, | Performed by: STUDENT IN AN ORGANIZED HEALTH CARE EDUCATION/TRAINING PROGRAM

## 2022-12-08 PROCEDURE — 1159F MED LIST DOCD IN RCRD: CPT | Mod: CPTII,,, | Performed by: STUDENT IN AN ORGANIZED HEALTH CARE EDUCATION/TRAINING PROGRAM

## 2022-12-08 PROCEDURE — 3008F PR BODY MASS INDEX (BMI) DOCUMENTED: ICD-10-PCS | Mod: CPTII,,, | Performed by: STUDENT IN AN ORGANIZED HEALTH CARE EDUCATION/TRAINING PROGRAM

## 2022-12-08 PROCEDURE — 90471 IMMUNIZATION ADMIN: CPT | Mod: ,,, | Performed by: STUDENT IN AN ORGANIZED HEALTH CARE EDUCATION/TRAINING PROGRAM

## 2022-12-08 PROCEDURE — 4010F ACE/ARB THERAPY RXD/TAKEN: CPT | Mod: CPTII,,, | Performed by: STUDENT IN AN ORGANIZED HEALTH CARE EDUCATION/TRAINING PROGRAM

## 2022-12-08 PROCEDURE — 90686 FLU VACCINE (QUAD) GREATER THAN OR EQUAL TO 3YO PRESERVATIVE FREE IM: ICD-10-PCS | Mod: ,,, | Performed by: STUDENT IN AN ORGANIZED HEALTH CARE EDUCATION/TRAINING PROGRAM

## 2022-12-08 PROCEDURE — 1160F PR REVIEW ALL MEDS BY PRESCRIBER/CLIN PHARMACIST DOCUMENTED: ICD-10-PCS | Mod: CPTII,,, | Performed by: STUDENT IN AN ORGANIZED HEALTH CARE EDUCATION/TRAINING PROGRAM

## 2022-12-08 PROCEDURE — 1160F RVW MEDS BY RX/DR IN RCRD: CPT | Mod: CPTII,,, | Performed by: STUDENT IN AN ORGANIZED HEALTH CARE EDUCATION/TRAINING PROGRAM

## 2022-12-08 PROCEDURE — 3008F BODY MASS INDEX DOCD: CPT | Mod: CPTII,,, | Performed by: STUDENT IN AN ORGANIZED HEALTH CARE EDUCATION/TRAINING PROGRAM

## 2022-12-08 RX ORDER — MELOXICAM 15 MG/1
15 TABLET ORAL DAILY
Qty: 20 TABLET | Refills: 2 | Status: SHIPPED | OUTPATIENT
Start: 2022-12-08 | End: 2023-01-30

## 2022-12-08 RX ORDER — LOSARTAN POTASSIUM 100 MG/1
100 TABLET ORAL DAILY
Qty: 90 TABLET | Refills: 3 | Status: SHIPPED | OUTPATIENT
Start: 2022-12-08 | End: 2023-03-27

## 2022-12-08 NOTE — PROGRESS NOTES
Subjective:       Patient ID: Nehal Araujo is a 62 y.o. female.    Past Medical History:   Hyperlipidemia  Hypertension   Gout  Arthritis   Obesity   Pre-Diabetes   Diverticulosis    Chief Complaint: Establish Care, Leg Pain (Right-), Knee Pain, and Hypertension    Presents to the clinic to establish care. BP slightly elevated today, endorsed compliance with her medication. Needs referral to OBGYN for cervical cancer screening. Due to influenza vaccine. Need mammogram ordered. Due for a [facility]/Visit. Previous labs showed low hematocrit and mild macrocytosis. Endorsed R knee pain, chronic, worsening. Achy nature. Along with episodic lower extremity swelling. Denies trauma. Also complained of lower back pain, radiates down legs, numbness and tingling.      Review of Systems   Constitutional:  Negative for chills, fatigue and fever.   Respiratory:  Negative for cough, shortness of breath and wheezing.    Cardiovascular:  Positive for leg swelling. Negative for chest pain and palpitations.   Gastrointestinal:  Negative for abdominal pain, diarrhea, nausea and vomiting.   Genitourinary:  Negative for dysuria and hematuria.   Musculoskeletal:  Positive for back pain and leg pain.   Neurological:  Positive for numbness. Negative for dizziness, syncope and weakness.   Psychiatric/Behavioral:  Negative for dysphoric mood and sleep disturbance. The patient is not nervous/anxious.        Objective:      Physical Exam  Vitals and nursing note reviewed.   Constitutional:       General: She is not in acute distress.     Appearance: Normal appearance. She is not ill-appearing.   Eyes:      General: No scleral icterus.     Extraocular Movements: Extraocular movements intact.      Conjunctiva/sclera: Conjunctivae normal.      Pupils: Pupils are equal, round, and reactive to light.   Cardiovascular:      Rate and Rhythm: Normal rate and regular rhythm.      Pulses: Normal pulses.      Heart sounds: Normal heart sounds. No murmur  heard.  Pulmonary:      Effort: Pulmonary effort is normal. No respiratory distress.      Breath sounds: Normal breath sounds. No wheezing.   Abdominal:      General: There is no distension.      Palpations: Abdomen is soft.      Tenderness: There is no abdominal tenderness.   Musculoskeletal:      Right knee: Crepitus present.      Right lower leg: No edema.      Left lower leg: No edema.   Skin:     General: Skin is warm.      Coloration: Skin is not jaundiced.   Neurological:      Mental Status: She is alert. Mental status is at baseline.      Gait: Gait normal.   Psychiatric:         Mood and Affect: Mood normal.         Behavior: Behavior normal.       Assessment & Plan:   1. Encounter to establish care with new doctor  Comments:  Reviewed medical history and reconciled medications   Ordered [facility]   Requested records from previous provider/specialist    2. Need for influenza vaccination  -     Influenza - Quadrivalent *Preferred* (6 months+) (PF)    3. Screening mammogram for breast cancer  -     Mammo Digital Screening Bilat w/ Guy; Future; Expected date: 12/08/2022    4. Primary hypertension  Assessment & Plan:  Today's BP slightly elevated  Increase Losartan to 100mg daily   Counseled on low sodium diet, exercise, tobacco avoidance, and limiting alcohol intake   Pt. Has home BP cuff, instructed to measure home BP and report abnormal values   Nurse Visit in 2 weeks for BP check, bring logs   Orders:  -     losartan (COZAAR) 100 MG tablet; Take 1 tablet (100 mg total) by mouth once daily.  Dispense: 90 tablet; Refill: 3    5. Mixed hyperlipidemia  Assessment & Plan:  Lipid Panel Ordered for next visit   ASCVD will be calculated afterwards   Continue Lipitor 40mg daily  Counseled on dietary modifications  Counseled to exercise 150 minutes weekly as exercise raises HDL and lowers LDL   Orders:  -     Lipid Panel; Future; Expected date: 12/08/2022    6. Cervical cancer screening  -     Ambulatory  referral/consult to Obstetrics / Gynecology; Future; Expected date: 03/08/2023    7. Class 1 obesity with serious comorbidity and body mass index (BMI) of 30.0 to 30.9 in adult, unspecified obesity type  Assessment & Plan:  Encouraged healthy lifestyle/diet modifications   Exercise 3-5x a week (>30 minutes, including a variety of cardio, weightbearing and lifting exercises)   Eat a well balanced diet comprised of fruit/vegetables, lean protein, and complex carbs    8. Chronic pain of right knee  Assessment & Plan:  Worsening  Ordered XR of Right Knee for further evaluation  Referral to physical therapy placed   Prescribed Mobic (do not take other NSAIDs at the same time), Tylenol as needed   Encouraged activity modification  Orders:  -     meloxicam (MOBIC) 15 MG tablet; Take 1 tablet (15 mg total) by mouth once daily.  Dispense: 20 tablet; Refill: 2  -     X-Ray Knee Complete 4 Or More Views Right; Future; Expected date: 12/08/2022  -     Ambulatory referral/consult to Physical/Occupational Therapy; Future; Expected date: 12/15/2022    9. Lumbar back pain with radiculopathy affecting right lower extremity  Assessment & Plan:  Worsening  Ordered XR of Lumbar spine, consider MRI for further evaluation  Referral to physical therapy placed   Prescribed Mobic(do not take any other NSAIDs during this scheduled course), take Tylenol as needed (max 3g daily), heating/ice alternating as needed, topical OTC agents   Encouraged activity modification  Consider Ortho/NGSY referral if no improvement   Orders:  -     X-Ray Lumbar Complete Including Flex And Ext; Future; Expected date: 12/08/2022  -     Ambulatory referral/consult to Physical/Occupational Therapy; Future; Expected date: 12/15/2022    10. Macrocytosis  Comments:  Noted on last labs, will reepat CBC to monitor for worsening  Ordered Iron Profile, discuss colon cancer screening next visit   Ordered Folate and Vitamin B12,w  Orders:  -     Iron and TIBC; Future;  Expected date: 12/08/2022  -     Ferritin; Future; Expected date: 12/08/2022  -     Folate; Future; Expected date: 12/08/2022  -     Vitamin B12; Future; Expected date: 12/08/2022  -     CBC Auto Differential; Future; Expected date: 12/26/2022    11. Low hematocrit  Comments:  See above for further details   Repeat CBC, iron profile, folate and Vitamin b12 levels   Orders:  -     Iron and TIBC; Future; Expected date: 12/08/2022  -     CBC Auto Differential; Future; Expected date: 12/26/2022    12. Pre-diabetes  Assessment & Plan:  Ordered A1c for next visit, goal A1c <7.0%   Continue ACE-I and Statin   Continue ADA diet, Counseled on importance of diet & weight loss  If elevated consider Metformin/GLP-1 agonist    Orders:  -     Hemoglobin A1C; Future; Expected date: 12/08/2022      Follow up in about 6 weeks (around 1/19/2023) for Wellness, HTN, R Knee . In addition to their scheduled follow up, the patient has also been instructed to follow up on as needed basis.

## 2022-12-20 DIAGNOSIS — M54.16 LUMBAR BACK PAIN WITH RADICULOPATHY AFFECTING RIGHT LOWER EXTREMITY: Primary | ICD-10-CM

## 2022-12-20 DIAGNOSIS — M51.36 DDD (DEGENERATIVE DISC DISEASE), LUMBAR: ICD-10-CM

## 2022-12-26 PROBLEM — M54.16 LUMBAR BACK PAIN WITH RADICULOPATHY AFFECTING RIGHT LOWER EXTREMITY: Chronic | Status: ACTIVE | Noted: 2022-12-08

## 2022-12-26 PROBLEM — I10 PRIMARY HYPERTENSION: Chronic | Status: ACTIVE | Noted: 2022-12-08

## 2022-12-26 PROBLEM — E66.811 CLASS 1 OBESITY WITH SERIOUS COMORBIDITY AND BODY MASS INDEX (BMI) OF 30.0 TO 30.9 IN ADULT: Chronic | Status: ACTIVE | Noted: 2022-12-08

## 2022-12-26 PROBLEM — E78.2 MIXED HYPERLIPIDEMIA: Chronic | Status: ACTIVE | Noted: 2022-12-08

## 2022-12-26 PROBLEM — G89.29 CHRONIC PAIN OF RIGHT KNEE: Chronic | Status: ACTIVE | Noted: 2022-12-08

## 2022-12-26 PROBLEM — R73.03 PRE-DIABETES: Chronic | Status: ACTIVE | Noted: 2022-12-08

## 2022-12-26 PROBLEM — E66.9 CLASS 1 OBESITY WITH SERIOUS COMORBIDITY AND BODY MASS INDEX (BMI) OF 30.0 TO 30.9 IN ADULT: Chronic | Status: ACTIVE | Noted: 2022-12-08

## 2022-12-26 PROBLEM — M25.561 CHRONIC PAIN OF RIGHT KNEE: Chronic | Status: ACTIVE | Noted: 2022-12-08

## 2022-12-26 NOTE — ASSESSMENT & PLAN NOTE
Worsening  Ordered XR of Lumbar spine, consider MRI for further evaluation  Referral to physical therapy placed   Prescribed Mobic(do not take any other NSAIDs during this scheduled course), take Tylenol as needed (max 3g daily), heating/ice alternating as needed, topical OTC agents   Encouraged activity modification  Consider Ortho/[facility] referral if no improvement

## 2022-12-26 NOTE — ASSESSMENT & PLAN NOTE
Ordered A1c for next visit, goal A1c <7.0%   Continue ACE-I and Statin   Continue ADA diet, Counseled on importance of diet & weight loss  If elevated consider Metformin/GLP-1 agonist

## 2022-12-26 NOTE — ASSESSMENT & PLAN NOTE
Worsening  Ordered XR of Right Knee for further evaluation  Referral to physical therapy placed   Prescribed Mobic (do not take other NSAIDs at the same time), Tylenol as needed   Encouraged activity modification

## 2022-12-26 NOTE — ASSESSMENT & PLAN NOTE
Today's BP slightly elevated  Increase Losartan to 100mg daily   Counseled on low sodium diet, exercise, tobacco avoidance, and limiting alcohol intake   Pt. Has home BP cuff, instructed to measure home BP and report abnormal values   Nurse Visit in 2 weeks for BP check, bring logs

## 2022-12-26 NOTE — ASSESSMENT & PLAN NOTE
Lipid Panel Ordered for next visit   ASCVD will be calculated afterwards   Continue Lipitor 40mg daily  Counseled on dietary modifications  Counseled to exercise 150 minutes weekly as exercise raises HDL and lowers LDL

## 2022-12-27 ENCOUNTER — TELEPHONE (OUTPATIENT)
Dept: PRIMARY CARE CLINIC | Facility: CLINIC | Age: 62
End: 2022-12-27
Payer: COMMERCIAL

## 2022-12-27 DIAGNOSIS — I10 PRIMARY HYPERTENSION: Primary | ICD-10-CM

## 2022-12-27 RX ORDER — HYDROCHLOROTHIAZIDE 25 MG/1
25 TABLET ORAL DAILY
Qty: 30 TABLET | Refills: 11 | Status: SHIPPED | OUTPATIENT
Start: 2022-12-27 | End: 2023-01-30 | Stop reason: ALTCHOICE

## 2022-12-27 NOTE — TELEPHONE ENCOUNTER
Losartan is maxed out. Will start HCTZ 25mg daily. Sent medication to the pharmacy, may take 3-5 days for full effect.     Keep checking BP.     Take Clonidine as needed if BP >170.     Thanks,   Keli Denton MD

## 2022-12-27 NOTE — TELEPHONE ENCOUNTER
Patient called with blood pressure reading    216/116-- last night  157/88-  165/100-  157/94-  152/82-        Losartan-100mg (started 2 days after last appt. )  Clonidine-0.1 mg -AS a breakthrough  in the event bp is elevated (given in ED)  Please review and advise if medication needs adjustment.    car

## 2023-01-09 DIAGNOSIS — E78.2 MIXED HYPERLIPIDEMIA: Primary | Chronic | ICD-10-CM

## 2023-01-09 DIAGNOSIS — M25.561 ACUTE PAIN OF RIGHT KNEE: ICD-10-CM

## 2023-01-09 DIAGNOSIS — R60.9 SWELLING: Primary | ICD-10-CM

## 2023-01-09 RX ORDER — ATORVASTATIN CALCIUM 40 MG/1
40 TABLET, FILM COATED ORAL DAILY
Qty: 90 TABLET | Refills: 1 | Status: SHIPPED | OUTPATIENT
Start: 2023-01-09 | End: 2023-04-13 | Stop reason: SDUPTHER

## 2023-01-09 NOTE — TELEPHONE ENCOUNTER
Requesting refill on   Atorvastatin 40mg.   Patient did also request any MRI of right knee be ordered.

## 2023-01-18 ENCOUNTER — TELEPHONE (OUTPATIENT)
Dept: PRIMARY CARE CLINIC | Facility: CLINIC | Age: 63
End: 2023-01-18
Payer: COMMERCIAL

## 2023-01-18 DIAGNOSIS — K21.9 GASTROESOPHAGEAL REFLUX DISEASE, UNSPECIFIED WHETHER ESOPHAGITIS PRESENT: ICD-10-CM

## 2023-01-18 DIAGNOSIS — I10 PRIMARY HYPERTENSION: Primary | Chronic | ICD-10-CM

## 2023-01-18 RX ORDER — OMEPRAZOLE 20 MG/1
20 CAPSULE, DELAYED RELEASE ORAL DAILY
Qty: 30 CAPSULE | Refills: 3 | Status: SHIPPED | OUTPATIENT
Start: 2023-01-18 | End: 2023-06-14 | Stop reason: SDUPTHER

## 2023-01-18 RX ORDER — CLONIDINE HYDROCHLORIDE 0.1 MG/1
0.1 TABLET ORAL 2 TIMES DAILY
Qty: 30 TABLET | Refills: 3 | Status: SHIPPED | OUTPATIENT
Start: 2023-01-18 | End: 2023-04-11

## 2023-01-18 RX ORDER — CLONIDINE HYDROCHLORIDE 0.1 MG/1
0.1 TABLET ORAL 2 TIMES DAILY
COMMUNITY
End: 2023-01-18 | Stop reason: SDUPTHER

## 2023-01-18 NOTE — TELEPHONE ENCOUNTER
----- Message from Kat Jordan sent at 1/18/2023  8:54 AM CST -----  Regarding: med refill  .Type:  RX Refill Request    Who Called: pt  Refill or New Rx:refill  RX Name and Strength:cloNIDine (CATAPRES) 0.1 MG tablet   How is the patient currently taking it? (ex. 1XDay):2xday  Is this a 30 day or 90 day RX:30  Preferred Pharmacy with phone number:[address]   Phone: [phone]  Local or Mail Order:local  Ordering Provider:Corrie  Would the patient rather a call back or a response via [facility]? Call back  Best Call Back Number:[phone]  Additional Information: pt needs authorization to refill prescription     .Type:  RX Refill Request    Who Called: pt  Refill or New Rx:refill  RX Name and Strength:omeprazole (PRILOSEC) 20 MG capsule  How is the patient currently taking it? (ex. 1XDay):1xday  Is this a 30 day or 90 day RX:30  Preferred Pharmacy with phone number:[address]   Phone: [phone]  Local or Mail Order:local  Ordering Provider:Corrie  Would the patient rather a call back or a response via [facility]? Call back  Best Call Back Number:[phone]  Additional Information: pt needs authorization to refill prescription

## 2023-01-30 ENCOUNTER — TELEPHONE (OUTPATIENT)
Dept: PRIMARY CARE CLINIC | Facility: CLINIC | Age: 63
End: 2023-01-30
Payer: COMMERCIAL

## 2023-01-30 DIAGNOSIS — I10 HYPERTENSION, UNSPECIFIED TYPE: Primary | ICD-10-CM

## 2023-01-30 RX ORDER — NIFEDIPINE 60 MG/1
60 TABLET, EXTENDED RELEASE ORAL DAILY
Qty: 30 TABLET | Refills: 11 | Status: SHIPPED | OUTPATIENT
Start: 2023-01-30 | End: 2023-04-11

## 2023-01-30 NOTE — TELEPHONE ENCOUNTER
----- Message from Kyara Villegas sent at 1/30/2023  1:34 PM CST -----  Regarding: Patient Returning Call  .Type:  Patient Returning Call    Who Called:pt   Who Left Message for Patient:Ramiro  Does the patient know what this is regarding?:BP reading  Would the patient rather a call back or a response via [facility]? Call back   Best Call Back Number: [phone]  Additional Information: pt would like for you to call her back to get the readings of BP..    01/30 176-105  01/29 161/97

## 2023-01-30 NOTE — TELEPHONE ENCOUNTER
Sent in Nifedipine 60mg daily.     Take 3rd dose of Clonidine 0.1mg if BP >170/100.     Continue Clonidine 0.1mg BID, Losartan 100mg daily.     Nurse visit for Thursday/Friday.     Recommend tylenol 1,000 mg TID. All NSAIDs like Mobic have the small potential to elevate BP, did she have this reaction to Ibuprofen when she took it in the past? If not will send it in.     Keli Denton MD

## 2023-01-30 NOTE — TELEPHONE ENCOUNTER
Spoke to patient who reports her blood pressure is elevated.   207/110-am  178/97-now.    Patient reports to have stop taking her HCTZ because it was causing her muscle cramps.   Patient is only taking   Clonidine-  Losartan -  Patient also reports to have read up that mobic  and it is unknown to raise blood pressure but she need it for pain.   Please review and advise if something else can be called in.

## 2023-02-01 ENCOUNTER — TELEPHONE (OUTPATIENT)
Dept: PRIMARY CARE CLINIC | Facility: CLINIC | Age: 63
End: 2023-02-01
Payer: COMMERCIAL

## 2023-02-01 NOTE — TELEPHONE ENCOUNTER
----- Message from Kyara Villegas sent at 2/1/2023  8:37 AM CST -----  Regarding: Med Advice  .Type:  Needs Medical Advice    Who Called: pt  Symptoms (please be specific): BP meds   Would the patient rather a call back or a response via [facility]? Call back   Best Call Back Number: [phone]  Additional Information: pt stated was sent BP meds on 01/31 and is  BP meds today is unsure how to take the meds.. she wanted to know should she add the BP meds to her already medication, please advise

## 2023-02-01 NOTE — TELEPHONE ENCOUNTER
Spoke to patient explained what is recommended by the doctor.   Clonidine - am  Losartan-am  Clonidine-pm  Nifedipine - (what was most convenient for her.  Confirmed she d/c hctz.  Patient reports to just needing reassurance she was taking as recommended.

## 2023-02-06 ENCOUNTER — LAB VISIT (OUTPATIENT)
Dept: LAB | Facility: HOSPITAL | Age: 63
End: 2023-02-06
Attending: COUNSELOR
Payer: COMMERCIAL

## 2023-02-06 DIAGNOSIS — D64.9 LOW HEMATOCRIT: ICD-10-CM

## 2023-02-06 DIAGNOSIS — D75.89 MACROCYTOSIS: ICD-10-CM

## 2023-02-06 DIAGNOSIS — R73.03 PRE-DIABETES: ICD-10-CM

## 2023-02-06 DIAGNOSIS — E78.2 MIXED HYPERLIPIDEMIA: ICD-10-CM

## 2023-02-06 LAB
BASOPHILS # BLD AUTO: 0.05 X10(3)/MCL (ref 0–0.2)
BASOPHILS NFR BLD AUTO: 0.8 %
CHOLEST SERPL-MCNC: 161 MG/DL
CHOLEST/HDLC SERPL: 4 {RATIO} (ref 0–5)
EOSINOPHIL # BLD AUTO: 0.09 X10(3)/MCL (ref 0–0.9)
EOSINOPHIL NFR BLD AUTO: 1.5 %
ERYTHROCYTE [DISTWIDTH] IN BLOOD BY AUTOMATED COUNT: 13.1 % (ref 11.5–17)
EST. AVERAGE GLUCOSE BLD GHB EST-MCNC: 125.5 MG/DL
FERRITIN SERPL-MCNC: 274.93 NG/ML (ref 4.63–204)
FOLATE SERPL-MCNC: 15.4 NG/ML (ref 7–31.4)
HBA1C MFR BLD: 6 %
HCT VFR BLD AUTO: 35.9 % (ref 37–47)
HDLC SERPL-MCNC: 45 MG/DL (ref 35–60)
HGB BLD-MCNC: 12.5 GM/DL (ref 12–16)
IMM GRANULOCYTES # BLD AUTO: 0.02 X10(3)/MCL (ref 0–0.04)
IMM GRANULOCYTES NFR BLD AUTO: 0.3 %
IRON SATN MFR SERPL: 30 % (ref 20–50)
IRON SERPL-MCNC: 104 UG/DL (ref 50–170)
LDLC SERPL CALC-MCNC: 60 MG/DL (ref 50–140)
LYMPHOCYTES # BLD AUTO: 2.06 X10(3)/MCL (ref 0.6–4.6)
LYMPHOCYTES NFR BLD AUTO: 34.1 %
MCH RBC QN AUTO: 32.8 PG
MCHC RBC AUTO-ENTMCNC: 34.8 MG/DL (ref 33–36)
MCV RBC AUTO: 94.2 FL (ref 80–94)
MONOCYTES # BLD AUTO: 0.48 X10(3)/MCL (ref 0.1–1.3)
MONOCYTES NFR BLD AUTO: 7.9 %
NEUTROPHILS # BLD AUTO: 3.34 X10(3)/MCL (ref 2.1–9.2)
NEUTROPHILS NFR BLD AUTO: 55.4 %
NRBC BLD AUTO-RTO: 0 %
PLATELET # BLD AUTO: 309 X10(3)/MCL (ref 130–400)
PMV BLD AUTO: 8.6 FL (ref 7.4–10.4)
RBC # BLD AUTO: 3.81 X10(6)/MCL (ref 4.2–5.4)
TIBC SERPL-MCNC: 241 UG/DL (ref 70–310)
TIBC SERPL-MCNC: 345 UG/DL (ref 250–450)
TRANSFERRIN SERPL-MCNC: 319 MG/DL (ref 173–360)
TRIGL SERPL-MCNC: 278 MG/DL (ref 37–140)
VIT B12 SERPL-MCNC: 489 PG/ML (ref 213–816)
VLDLC SERPL CALC-MCNC: 56 MG/DL
WBC # SPEC AUTO: 6 X10(3)/MCL (ref 4.5–11.5)

## 2023-02-06 PROCEDURE — 36415 COLL VENOUS BLD VENIPUNCTURE: CPT

## 2023-02-06 PROCEDURE — 83036 HEMOGLOBIN GLYCOSYLATED A1C: CPT

## 2023-02-06 PROCEDURE — 83550 IRON BINDING TEST: CPT

## 2023-02-06 PROCEDURE — 82746 ASSAY OF FOLIC ACID SERUM: CPT

## 2023-02-06 PROCEDURE — 82607 VITAMIN B-12: CPT

## 2023-02-06 PROCEDURE — 82728 ASSAY OF FERRITIN: CPT

## 2023-02-06 PROCEDURE — 80061 LIPID PANEL: CPT

## 2023-02-06 PROCEDURE — 85025 COMPLETE CBC W/AUTO DIFF WBC: CPT

## 2023-02-07 ENCOUNTER — OFFICE VISIT (OUTPATIENT)
Dept: PRIMARY CARE CLINIC | Facility: CLINIC | Age: 63
End: 2023-02-07
Payer: COMMERCIAL

## 2023-02-07 VITALS
BODY MASS INDEX: 30.48 KG/M2 | RESPIRATION RATE: 20 BRPM | HEIGHT: 63 IN | HEART RATE: 88 BPM | WEIGHT: 172 LBS | TEMPERATURE: 97 F | OXYGEN SATURATION: 98 % | SYSTOLIC BLOOD PRESSURE: 133 MMHG | DIASTOLIC BLOOD PRESSURE: 74 MMHG

## 2023-02-07 DIAGNOSIS — S83.241A ACUTE MEDIAL MENISCUS TEAR OF RIGHT KNEE, INITIAL ENCOUNTER: ICD-10-CM

## 2023-02-07 DIAGNOSIS — Z12.11 COLON CANCER SCREENING: ICD-10-CM

## 2023-02-07 DIAGNOSIS — E78.1 HYPERTRIGLYCERIDEMIA: ICD-10-CM

## 2023-02-07 DIAGNOSIS — R73.03 PRE-DIABETES: Chronic | ICD-10-CM

## 2023-02-07 DIAGNOSIS — Z12.4 CERVICAL CANCER SCREENING: ICD-10-CM

## 2023-02-07 DIAGNOSIS — E66.9 CLASS 1 OBESITY WITH SERIOUS COMORBIDITY AND BODY MASS INDEX (BMI) OF 30.0 TO 30.9 IN ADULT, UNSPECIFIED OBESITY TYPE: Chronic | ICD-10-CM

## 2023-02-07 DIAGNOSIS — Z00.00 WELLNESS EXAMINATION: Primary | ICD-10-CM

## 2023-02-07 DIAGNOSIS — I10 PRIMARY HYPERTENSION: Chronic | ICD-10-CM

## 2023-02-07 PROBLEM — M51.369 LUMBAR DEGENERATIVE DISC DISEASE: Chronic | Status: ACTIVE | Noted: 2023-02-07

## 2023-02-07 PROBLEM — M51.369 LUMBAR DEGENERATIVE DISC DISEASE: Status: ACTIVE | Noted: 2023-02-07

## 2023-02-07 PROBLEM — M51.36 LUMBAR DEGENERATIVE DISC DISEASE: Status: ACTIVE | Noted: 2023-02-07

## 2023-02-07 PROBLEM — M51.36 LUMBAR DEGENERATIVE DISC DISEASE: Chronic | Status: ACTIVE | Noted: 2023-02-07

## 2023-02-07 PROBLEM — M17.11 PRIMARY OSTEOARTHRITIS OF RIGHT KNEE: Status: ACTIVE | Noted: 2023-02-07

## 2023-02-07 PROCEDURE — 3078F PR MOST RECENT DIASTOLIC BLOOD PRESSURE < 80 MM HG: ICD-10-PCS | Mod: CPTII,,, | Performed by: STUDENT IN AN ORGANIZED HEALTH CARE EDUCATION/TRAINING PROGRAM

## 2023-02-07 PROCEDURE — 99396 PR PREVENTIVE VISIT,EST,40-64: ICD-10-PCS | Mod: ,,, | Performed by: STUDENT IN AN ORGANIZED HEALTH CARE EDUCATION/TRAINING PROGRAM

## 2023-02-07 PROCEDURE — 3078F DIAST BP <80 MM HG: CPT | Mod: CPTII,,, | Performed by: STUDENT IN AN ORGANIZED HEALTH CARE EDUCATION/TRAINING PROGRAM

## 2023-02-07 PROCEDURE — 3008F BODY MASS INDEX DOCD: CPT | Mod: CPTII,,, | Performed by: STUDENT IN AN ORGANIZED HEALTH CARE EDUCATION/TRAINING PROGRAM

## 2023-02-07 PROCEDURE — 3075F PR MOST RECENT SYSTOLIC BLOOD PRESS GE 130-139MM HG: ICD-10-PCS | Mod: CPTII,,, | Performed by: STUDENT IN AN ORGANIZED HEALTH CARE EDUCATION/TRAINING PROGRAM

## 2023-02-07 PROCEDURE — 3075F SYST BP GE 130 - 139MM HG: CPT | Mod: CPTII,,, | Performed by: STUDENT IN AN ORGANIZED HEALTH CARE EDUCATION/TRAINING PROGRAM

## 2023-02-07 PROCEDURE — 3008F PR BODY MASS INDEX (BMI) DOCUMENTED: ICD-10-PCS | Mod: CPTII,,, | Performed by: STUDENT IN AN ORGANIZED HEALTH CARE EDUCATION/TRAINING PROGRAM

## 2023-02-07 PROCEDURE — 99396 PREV VISIT EST AGE 40-64: CPT | Mod: ,,, | Performed by: STUDENT IN AN ORGANIZED HEALTH CARE EDUCATION/TRAINING PROGRAM

## 2023-02-07 RX ORDER — ICOSAPENT ETHYL 1000 MG/1
2 CAPSULE ORAL 2 TIMES DAILY
Qty: 360 CAPSULE | Refills: 3 | Status: SHIPPED | OUTPATIENT
Start: 2023-02-07 | End: 2023-11-14

## 2023-02-07 NOTE — PATIENT INSTRUCTIONS
I also recommend the following lifestyle changes:  1. Avoid saturated fats: Examples include butter, full fat dairy products (like cheese, ice cream), red meat, processed meat (like sausage, bologna), baked goods (like biscuits, cookies).  2. Increase dietary fiber: Examples include apples, berries, whole grains, beans, etc.  3. Increase omega-3 fat intake: Examples include some fish (like tuna, salmon), olive or canola oil, nuts/grains (like flaxseed, walnuts, vahe seeds)  4. Exercise lowers bad cholesterol (LDL) and increases good cholesterol (HDL).

## 2023-02-07 NOTE — PROGRESS NOTES
ANNUAL WELLNESS NOTE    Chief Complaint:  Annual Exam, Headache, and Medication Problem (Feels )     HPI:  Nehal Araujo is a 62 y.o. female    Past Medical History:   Class 1 obesity with serious comorbidity and body mass index (BMI) of   30.0 to 30.9 in adult  History of gout  Hyperlipidemia  Hypertension  Lumbar degenerative disc disease  Pre-diabetes  Primary osteoarthritis of right knee    Patient Care Team:  Keli Denton MD as PCP - General (Internal Medicine)    Presents today for wellness visit. Describes overall health as good. Diet is balanced. Exercises 5 or more times per week. Tobacco use: previous but has quit. Alcohol use: 5-10 drinks/wk. Caffeine intake: 1 caffeinated drink day. Eye exam: annually (wears glasses). Dental exam: overdue.    BP is better controlled now with the changes. Still having R knee pain, hasn't heard from Ortho, will reach out to get an appointment. MRI showed meniscus tear. Endorsed headaches, but improving, thinks it may of been a reaction to the HCTZ as well.      Review of Systems   Constitutional:  Negative for chills and fever.   Respiratory:  Negative for cough, shortness of breath and wheezing.    Cardiovascular:  Negative for chest pain and palpitations.   Gastrointestinal:  Negative for abdominal pain, nausea and vomiting.   Genitourinary:  Negative for dysuria and hematuria.   Musculoskeletal:  Positive for arthralgias, back pain, joint swelling and leg pain.   Neurological:  Positive for headaches. Negative for syncope and weakness.     Physical Exam  Vitals and nursing note reviewed.   Constitutional:       General: She is not in acute distress.     Appearance: Normal appearance. She is not ill-appearing.   Eyes:      General: No scleral icterus.     Extraocular Movements: Extraocular movements intact.      Conjunctiva/sclera: Conjunctivae normal.      Pupils: Pupils are equal, round, and reactive to light.   Cardiovascular:      Rate and Rhythm: Normal rate  and regular rhythm.      Pulses: Normal pulses.      Heart sounds: Normal heart sounds. No murmur heard.  Pulmonary:      Effort: Pulmonary effort is normal. No respiratory distress.      Breath sounds: Normal breath sounds. No wheezing.   Abdominal:      General: There is no distension.      Palpations: Abdomen is soft.      Tenderness: There is no abdominal tenderness.   Musculoskeletal:      Right knee: Swelling, effusion and crepitus present. Decreased range of motion.      Right lower leg: No edema.      Left lower leg: No edema.   Skin:     General: Skin is warm.      Coloration: Skin is not jaundiced.   Neurological:      Mental Status: She is alert. Mental status is at baseline.      Gait: Gait normal.   Psychiatric:         Mood and Affect: Mood normal.         Behavior: Behavior normal.     Health Maintenance:  Routine Health Maintenance   Exercise as tolerated, >30 minutes a day for 3-5 days a week.  Counseled patient on compliance with medications and healthy diet.     Age-Appropriate Screening  Vaccinations:    - Flu: UTD, 12/2022   - Pneumonia: N/A   - Shingles (>50): Recommended going to local pharmacy to get 2 dose series, voiced understanding and agreed with plan   - Tdap: UTD, 4 years ago per patient   - COVID: 1 dose series     Screening:   - Pap Smear (21-65): Referral to OBGYN placed   - Mammogram (40-50 discuss w/ pt, all >50): UTD, 1/2023   - Osteoporosis (all>65, sooner if risk factors): N/A   - Lung Ca. Screening (50-80 if >20 pack yrs current or quit <15 yrs): N/A   - Colon Ca. Screening (age >45): Referral to GI placed     Assessment/Plan:  1. Wellness examination  Comments:  See above for further details   Orders:  -     Ambulatory referral/consult to Obstetrics / Gynecology    2. Primary hypertension  Assessment & Plan:  Today's BP WNL   Continue Losartan, Clonidine and Nifedipine  Counseled on low sodium diet, exercise, tobacco avoidance, and limiting alcohol intake   Pt. Has home BP  cuff, instructed to measure home BP and report abnormal values     3. Hypertriglyceridemia  Assessment & Plan:  Lipid Panel showed elevated triglycerides   Start Vascepa 2g BID   Continue Lipitor 40mg daily  Counseled on dietary modifications  Counseled to exercise 150 minutes weekly as exercise raises HDL and lowers LDL   Orders:  -     icosapent ethyL (VASCEPA) 1 gram Cap; Take 2 capsules (2 g total) by mouth 2 (two) times daily. (Patient not taking: Reported on 2/8/2023)  Dispense: 360 capsule; Refill: 3    4. Colon cancer screening  -     Ambulatory referral/consult to Gastroenterology    5. Cervical cancer screening  -     Ambulatory referral/consult to Obstetrics / Gynecology    6. Acute medial meniscus tear of right knee, initial encounter  Assessment & Plan:  Confirmed on MRI   Continue NSAIDs, Tylenol alternating as needed  Has appointment with Ortho tomorrow, defer management   Encouraged activity modification    7. Pre-diabetes  Assessment & Plan:  Improved, A1c 6.0, repeat in 6 months if remains elevated or not improved will consider medication  Continue ACE-I and Statin   Continue ADA diet, Counseled on importance of diet & weight loss  If elevated consider Metformin/GLP-1 agonist      8. Class 1 obesity with serious comorbidity and body mass index (BMI) of 30.0 to 30.9 in adult, unspecified obesity type  Assessment & Plan:  Encouraged healthy lifestyle/diet modifications   Exercise 3-5x a week (>30 minutes, including a variety of cardio, weightbearing and lifting exercises)   Eat a well balanced diet comprised of fruit/vegetables, lean protein, and complex carbs    RTC in 3 months or sooner if needed

## 2023-02-08 ENCOUNTER — OFFICE VISIT (OUTPATIENT)
Dept: ORTHOPEDICS | Facility: CLINIC | Age: 63
End: 2023-02-08
Payer: COMMERCIAL

## 2023-02-08 DIAGNOSIS — S83.241A OTHER TEAR OF MEDIAL MENISCUS OF RIGHT KNEE AS CURRENT INJURY, INITIAL ENCOUNTER: Primary | ICD-10-CM

## 2023-02-08 DIAGNOSIS — M25.461 EFFUSION OF RIGHT KNEE: ICD-10-CM

## 2023-02-08 PROCEDURE — 1159F MED LIST DOCD IN RCRD: CPT | Mod: CPTII,,, | Performed by: ORTHOPAEDIC SURGERY

## 2023-02-08 PROCEDURE — 99203 PR OFFICE/OUTPT VISIT, NEW, LEVL III, 30-44 MIN: ICD-10-PCS | Mod: ,,, | Performed by: ORTHOPAEDIC SURGERY

## 2023-02-08 PROCEDURE — 99203 OFFICE O/P NEW LOW 30 MIN: CPT | Mod: ,,, | Performed by: ORTHOPAEDIC SURGERY

## 2023-02-08 PROCEDURE — 1159F PR MEDICATION LIST DOCUMENTED IN MEDICAL RECORD: ICD-10-PCS | Mod: CPTII,,, | Performed by: ORTHOPAEDIC SURGERY

## 2023-02-08 RX ORDER — SODIUM CHLORIDE 9 MG/ML
INJECTION, SOLUTION INTRAVENOUS CONTINUOUS
Status: CANCELLED | OUTPATIENT
Start: 2023-02-08

## 2023-02-08 NOTE — ASSESSMENT & PLAN NOTE
Today's BP WNL   Continue Losartan, Clonidine and Nifedipine  Counseled on low sodium diet, exercise, tobacco avoidance, and limiting alcohol intake   Pt. Has home BP cuff, instructed to measure home BP and report abnormal values

## 2023-02-08 NOTE — ASSESSMENT & PLAN NOTE
Confirmed on MRI   Continue NSAIDs, Tylenol alternating as needed  Has appointment with Ortho tomorrow, defer management   Encouraged activity modification

## 2023-02-08 NOTE — PROGRESS NOTES
Chief Complaint:   Chief Complaint   Patient presents with    Right Knee - Injury    Knee Injury     right knee effusion and medial meniscus tear, MRI in chart, pain for about 3 months, pt states she was not aware of any injury, no prior surgery       Consulting Physician: Keli Denton, *    History of present illness:    she is a pleasant 62 y.o. year old female right knee pain. Patient presents today with knee pain for several months. This knee pain is moderate to severe. Patient states pain is worse with ambulation and activity, especially squats and lunges. Patient has tried prescription strength anti-inflammatory medications without relief.  She has tried home exercise program for greater than 12 weeks without relief.  She had a traumatic injury when she was squatting while painting.  Pain is located on the medial side.    Past Medical History:   Diagnosis Date    Class 1 obesity with serious comorbidity and body mass index (BMI) of 30.0 to 30.9 in adult 2022    History of gout 2022    Hyperlipidemia     Hypertension     Lumbar degenerative disc disease 2023    Pre-diabetes 2022    Primary osteoarthritis of right knee 2023       Past Surgical History:   Procedure Laterality Date     SECTION      CHOLECYSTECTOMY      colonectomy      ENDOMETRIAL ABLATION      thumb surgery      TONSILLECTOMY         Current Outpatient Medications   Medication Sig    atorvastatin (LIPITOR) 40 MG tablet Take 1 tablet (40 mg total) by mouth once daily.    cloNIDine (CATAPRES) 0.1 MG tablet Take 1 tablet (0.1 mg total) by mouth 2 (two) times daily.    losartan (COZAAR) 100 MG tablet Take 1 tablet (100 mg total) by mouth once daily.    NIFEdipine (PROCARDIA-XL) 60 MG (OSM) 24 hr tablet Take 1 tablet (60 mg total) by mouth once daily.    omeprazole (PRILOSEC) 20 MG capsule Take 1 capsule (20 mg total) by mouth once daily.    ibuprofen (ADVIL,MOTRIN) 800 MG tablet Take 800 mg by mouth 3 (three)  times daily as needed.    icosapent ethyL (VASCEPA) 1 gram Cap Take 2 capsules (2 g total) by mouth 2 (two) times daily. (Patient not taking: Reported on 2023)     No current facility-administered medications for this visit.       Review of patient's allergies indicates:  No Known Allergies    Family History   Problem Relation Age of Onset    Diabetes Mother     Breast cancer Mother     Heart disease Mother        Social History     Socioeconomic History    Marital status:    Tobacco Use    Smoking status: Former     Packs/day: 0.75     Years: 15.00     Pack years: 11.25     Types: Cigarettes     Quit date:      Years since quittin.1    Smokeless tobacco: Never   Substance and Sexual Activity    Alcohol use: Yes     Alcohol/week: 5.0 standard drinks     Types: 5 Drinks containing 0.5 oz of alcohol per week       Review of Systems:    Constitution:   Denies chills, fever, and sweats.  HENT:   Denies headaches or blurry vision.  Cardiovascular:  Denies chest pain or irregular heart beat.  Respiratory:   Denies cough or shortness of breath.  Gastrointestinal:  Denies abdominal pain, nausea, or vomiting.  Musculoskeletal:   Denies muscle cramps.  Neurological:   Denies dizziness or focal weakness.  Psychiatric/Behavior: Normal mental status.  Hematology/Lymph:  Denies bleeding problem or easy bruising/bleeding.  Skin:    Denies rash or suspicious lesions.    Examination:    Vital Signs:    Vitals:    23 1019   PainSc:   3       There is no height or weight on file to calculate BMI.    Constitution:   Well-developed, well nourished patient in no acute distress.  Neurological:   Alert and oriented x 3 and cooperative to examination.     Psychiatric/Behavior: Normal mental status.  Respiratory:   No shortness of breath.  Eyes:    Extraoccular muscles intact  Skin:    No scars, rash or suspicious lesions.    MSK:   Standing exam  stance: normal alignment, no significant leg-length discrepancy  gait:  antalgic    Knee examination  - General comments: unremarkable appearance    - Tenderness:medial jointline    Knee                  RIGHT    LEFT  Skin:                  Intact      Intact  ROM:                 0-130      0-130  Effusion:               +         Neg  MJL TTP:           +         Neg  LJL TTP:            Neg         Neg  Berenice:           +         Neg  Pat crep:            Neg         Neg  Patella TTPs:     Neg         Neg  Patella grind:      Neg        Neg  Lachman:           Neg        Neg  Pivot shift:          Neg        Neg  Valgus stress:    Neg        Neg  Varus stress:      Neg        Neg  Posterior drawer: Neg       Neg    N-V intact intact  Hip: nml nml    Lower extremity edema:Negative     Imaging: X-rays and MRI were reviewed which shows minimal arthrosis with a posterior meniscal root tear.     Assessment: Other tear of medial meniscus of right knee as current injury, initial encounter  -     Ambulatory referral/consult to Orthopedics    Effusion of right knee  -     Ambulatory referral/consult to Orthopedics        Plan:  Recommend surgical intervention:  Right knee arthroscopic posterior medial  meniscal root repair.  We discussed the details of the procedure and expected postoperative course.  We discussed the benefits of surgery which be to decrease her pain and increase her function.  We discussed the risks of surgery which is small but could be significant if she is continued pain, stiffness, or infection.  After discussion she would like to proceed.  Plans for surgery February 23rd

## 2023-02-08 NOTE — ASSESSMENT & PLAN NOTE
Improved, A1c 6.0, repeat in 6 months if remains elevated or not improved will consider medication  Continue ACE-I and Statin   Continue ADA diet, Counseled on importance of diet & weight loss  If elevated consider Metformin/GLP-1 agonist

## 2023-02-08 NOTE — ASSESSMENT & PLAN NOTE
Lipid Panel showed elevated triglycerides   Start Vascepa 2g BID   Continue Lipitor 40mg daily  Counseled on dietary modifications  Counseled to exercise 150 minutes weekly as exercise raises HDL and lowers LDL

## 2023-02-09 RX ORDER — ACETAMINOPHEN 500 MG
500 TABLET ORAL EVERY 6 HOURS PRN
Status: ON HOLD | COMMUNITY
End: 2023-02-23 | Stop reason: HOSPADM

## 2023-02-13 ENCOUNTER — TELEPHONE (OUTPATIENT)
Dept: PRIMARY CARE CLINIC | Facility: CLINIC | Age: 63
End: 2023-02-13
Payer: COMMERCIAL

## 2023-02-13 DIAGNOSIS — R94.31 ABNORMAL EKG: ICD-10-CM

## 2023-02-13 DIAGNOSIS — R73.03 PRE-DIABETES: Chronic | ICD-10-CM

## 2023-02-13 DIAGNOSIS — I10 PRIMARY HYPERTENSION: Primary | Chronic | ICD-10-CM

## 2023-02-13 DIAGNOSIS — E78.2 MIXED HYPERLIPIDEMIA: Chronic | ICD-10-CM

## 2023-02-13 NOTE — TELEPHONE ENCOUNTER
----- Message from Ramiro Trimble MA sent at 2/9/2023  9:47 AM CST -----  Regarding: FW: Med Advice  None to compare with.  Chest pain episodes  Patient offered ekg in office or referral to [facility].  ----- Message -----  From: Kyara Villegas  Sent: 2/9/2023   9:34 AM CST  To: [facility] Staff  Subject: Med Advice                                       .Type:  Needs Medical Advice    Who Called: pt  Symptoms (please be specific): chest pain    Would the patient rather a call back or a response via [facility]? Call back   Best Call Back Number: [phone]  Additional Information: pt would like the doctor to look at her EKG that was done in the ER in 10/2022, she stated she was looking in her records and seen that he EKG was abnormal and would like advice of EKG results from 10/2022

## 2023-02-13 NOTE — TELEPHONE ENCOUNTER
Spoke to patient. Currently has no active chest pain/palpitations.     Told by former PCP years ago that her EKG was normal. Told at Urgent Care that the EKG was unremarkable on 10/2022.  Reviewed, showed nonspecific T wave abnormality. No acute ST changes showing ischemia. Also possible LAE.     Developed heart palpitations when taking HCTZ, has since resolved when she stopped the medication. Had chest pain episode >year ago but worsened with exertion/movement, told it was muscular skeletal.     Family history of CHF and CVA. Never had cardiac evaluation.     Given abnormal EKG, and current conditions that increase risk of CAD (HTN, HLD, Pre-D, Obesity), will send referral to Cardiology for further evaluation. Especially given surgery upcoming for medial meniscus repair. Patient voiced understanding and agreed with plan.

## 2023-02-20 ENCOUNTER — CLINICAL SUPPORT (OUTPATIENT)
Dept: RESPIRATORY THERAPY | Facility: HOSPITAL | Age: 63
End: 2023-02-20
Attending: ORTHOPAEDIC SURGERY
Payer: COMMERCIAL

## 2023-02-22 ENCOUNTER — ANESTHESIA EVENT (OUTPATIENT)
Dept: SURGERY | Facility: HOSPITAL | Age: 63
End: 2023-02-22
Payer: COMMERCIAL

## 2023-02-22 ENCOUNTER — OFFICE VISIT (OUTPATIENT)
Dept: URGENT CARE | Facility: CLINIC | Age: 63
End: 2023-02-22
Payer: COMMERCIAL

## 2023-02-22 VITALS
RESPIRATION RATE: 20 BRPM | OXYGEN SATURATION: 97 % | WEIGHT: 167 LBS | DIASTOLIC BLOOD PRESSURE: 90 MMHG | SYSTOLIC BLOOD PRESSURE: 161 MMHG | BODY MASS INDEX: 29.59 KG/M2 | HEIGHT: 63 IN | TEMPERATURE: 100 F | HEART RATE: 102 BPM

## 2023-02-22 DIAGNOSIS — S00.33XA CONTUSION OF NOSE, INITIAL ENCOUNTER: ICD-10-CM

## 2023-02-22 DIAGNOSIS — R04.0 NOSEBLEED: Primary | ICD-10-CM

## 2023-02-22 PROCEDURE — 3008F PR BODY MASS INDEX (BMI) DOCUMENTED: ICD-10-PCS | Mod: CPTII,,, | Performed by: PHYSICIAN ASSISTANT

## 2023-02-22 PROCEDURE — 1159F PR MEDICATION LIST DOCUMENTED IN MEDICAL RECORD: ICD-10-PCS | Mod: CPTII,,, | Performed by: PHYSICIAN ASSISTANT

## 2023-02-22 PROCEDURE — 1160F PR REVIEW ALL MEDS BY PRESCRIBER/CLIN PHARMACIST DOCUMENTED: ICD-10-PCS | Mod: CPTII,,, | Performed by: PHYSICIAN ASSISTANT

## 2023-02-22 PROCEDURE — 3008F BODY MASS INDEX DOCD: CPT | Mod: CPTII,,, | Performed by: PHYSICIAN ASSISTANT

## 2023-02-22 PROCEDURE — 3080F DIAST BP >= 90 MM HG: CPT | Mod: CPTII,,, | Performed by: PHYSICIAN ASSISTANT

## 2023-02-22 PROCEDURE — 3080F PR MOST RECENT DIASTOLIC BLOOD PRESSURE >= 90 MM HG: ICD-10-PCS | Mod: CPTII,,, | Performed by: PHYSICIAN ASSISTANT

## 2023-02-22 PROCEDURE — 1159F MED LIST DOCD IN RCRD: CPT | Mod: CPTII,,, | Performed by: PHYSICIAN ASSISTANT

## 2023-02-22 PROCEDURE — 3077F PR MOST RECENT SYSTOLIC BLOOD PRESSURE >= 140 MM HG: ICD-10-PCS | Mod: CPTII,,, | Performed by: PHYSICIAN ASSISTANT

## 2023-02-22 PROCEDURE — 99212 PR OFFICE/OUTPT VISIT, EST, LEVL II, 10-19 MIN: ICD-10-PCS | Mod: ,,, | Performed by: PHYSICIAN ASSISTANT

## 2023-02-22 PROCEDURE — 3077F SYST BP >= 140 MM HG: CPT | Mod: CPTII,,, | Performed by: PHYSICIAN ASSISTANT

## 2023-02-22 PROCEDURE — 1160F RVW MEDS BY RX/DR IN RCRD: CPT | Mod: CPTII,,, | Performed by: PHYSICIAN ASSISTANT

## 2023-02-22 PROCEDURE — 99212 OFFICE O/P EST SF 10 MIN: CPT | Mod: ,,, | Performed by: PHYSICIAN ASSISTANT

## 2023-02-22 RX ORDER — MELOXICAM 15 MG/1
15 TABLET ORAL
COMMUNITY
Start: 2023-02-08 | End: 2023-11-14 | Stop reason: SDUPTHER

## 2023-02-22 NOTE — PROGRESS NOTES
"Subjective:       Patient ID: Nehal Araujo is a 62 y.o. female.    Vitals:  height is 5' 3" (1.6 m) and weight is 75.8 kg (167 lb). Her temperature is 99.5 °F (37.5 °C). Her blood pressure is 161/90 (abnormal) and her pulse is 102. Her respiration is 20 and oxygen saturation is 97%.     Chief Complaint: Nose Problem (Pt fell earlier today when pt hit a metal bar while moving boxes. Pt has outpatient knee surgery (torn meniscus) )    HPI  patient with accidental mechanical trip and fall into closet striking bridge of nose on metal bar without loss of consciousness.  Patient reports acute nosebleed controlled at home transported by spouse to Urgent Care for nasal contusion evaluation.   Nose Problem     Additional comments: Pt fell earlier today when pt hit a metal bar while   moving boxes. Pt has outpatient knee surgery (torn meniscus) tomorrow      Constitution: Negative for generalized weakness.   HENT:  Positive for nosebleeds. Negative for sinus pain.    Neck: Negative for neck pain.   Cardiovascular:  Negative for passing out.   Eyes: Negative.    Gastrointestinal:  Negative for nausea and vomiting.   Musculoskeletal:  Negative for back pain.   Skin: Negative.    Allergic/Immunologic: Negative.    Neurological:  Negative for dizziness, passing out and altered mental status.   Hematologic/Lymphatic: Negative for easy bruising/bleeding. Does not bruise/bleed easily.   Psychiatric/Behavioral:  Negative for altered mental status.      Objective:      Physical Exam   Constitutional: She is oriented to person, place, and time. She appears well-developed. She is cooperative.  Non-toxic appearance.      Comments:Awake alert pleasant ambulatory female talking to      HENT:   Head: Normocephalic.      Comments: Nasal bridge moderate edema ecchymosis no external laceration no acute nosebleeds  Ears:   Right Ear: Hearing normal.   Left Ear: Hearing normal.   Nose: Mucosal edema, sinus tenderness and congestion present. " No nose lacerations or nasal septal hematoma. No epistaxis. Right sinus exhibits no maxillary sinus tenderness and no frontal sinus tenderness. Left sinus exhibits no maxillary sinus tenderness and no frontal sinus tenderness.   Mouth/Throat: Uvula is midline, oropharynx is clear and moist and mucous membranes are normal. No trismus in the jaw. Normal dentition. No uvula swelling. No oropharyngeal exudate, posterior oropharyngeal edema or posterior oropharyngeal erythema.   Moderate right turbinate swelling no visible blood or clot, no active bleeding      Comments: Moderate right turbinate swelling no visible blood or clot, no active bleeding  Eyes: Conjunctivae and lids are normal. Pupils are equal, round, and reactive to light. No scleral icterus. Extraocular movement intact   Neck: Trachea normal and phonation normal. Neck supple. No edema present. No erythema present. No neck rigidity present.   Cardiovascular: Normal rate, regular rhythm and normal pulses.   Pulmonary/Chest: Effort normal. She has no decreased breath sounds.   Abdominal: Normal appearance.   Musculoskeletal: Normal range of motion.         General: Normal range of motion.      Cervical back: She exhibits no tenderness.   Neurological: no focal deficit. She is alert and oriented to person, place, and time. She displays no weakness. No cranial nerve deficit. She exhibits normal muscle tone.   Skin: Skin is warm, dry, intact and not diaphoretic. bruising   Psychiatric: Her speech is normal and behavior is normal. Mood, judgment and thought content normal.   Nursing note and vitals reviewed.         Previous History      Review of patient's allergies indicates:   Allergen Reactions    Percocet [oxycodone-acetaminophen] Itching       Past Medical History:   Diagnosis Date    Class 1 obesity with serious comorbidity and body mass index (BMI) of 30.0 to 30.9 in adult 12/8/2022    History of gout 12/8/2022    Hyperlipidemia     Hypertension     Lumbar  "degenerative disc disease 2023    Pre-diabetes 2022    Primary osteoarthritis of right knee 2023     Current Outpatient Medications   Medication Instructions    acetaminophen (TYLENOL) 500 mg, Oral, Every 6 hours PRN    atorvastatin (LIPITOR) 40 mg, Oral, Daily    cloNIDine (CATAPRES) 0.1 mg, Oral, 2 times daily    ibuprofen (ADVIL,MOTRIN) 800 mg, Oral, 3 times daily PRN    icosapent ethyL (VASCEPA) 2 g, Oral, 2 times daily    losartan (COZAAR) 100 mg, Oral, Daily    meloxicam (MOBIC) 15 mg, Oral    NIFEdipine (PROCARDIA-XL) 60 mg, Oral, Daily    omeprazole (PRILOSEC) 20 mg, Oral, Daily     Past Surgical History:   Procedure Laterality Date    BUNIONECTOMY Bilateral      SECTION      x 3    CHOLECYSTECTOMY      COLECTOMY      ENDOMETRIAL ABLATION      thumb surgery      TONSILLECTOMY       Family History   Problem Relation Age of Onset    Diabetes Mother     Breast cancer Mother     Heart disease Mother     Stroke Mother        Social History     Tobacco Use    Smoking status: Former     Packs/day: 0.75     Years: 15.00     Pack years: 11.25     Types: Cigarettes     Quit date:      Years since quittin.1    Smokeless tobacco: Never   Substance Use Topics    Alcohol use: Yes     Alcohol/week: 5.0 standard drinks     Types: 5 Drinks containing 0.5 oz of alcohol per week     Comment: soc    Drug use: Never        Physical Exam      Vital Signs Reviewed   BP (!) 161/90   Pulse 102   Temp 99.5 °F (37.5 °C)   Resp 20   Ht 5' 3" (1.6 m)   Wt 75.8 kg (167 lb)   SpO2 97%   BMI 29.58 kg/m²        Procedures    Procedures     Labs     Results for orders placed or performed in visit on 23   CBC with Differential   Result Value Ref Range    WBC 6.0 4.5 - 11.5 x10(3)/mcL    RBC 3.81 (L) 4.20 - 5.40 x10(6)/mcL    Hgb 12.5 12.0 - 16.0 gm/dL    Hct 35.9 (L) 37.0 - 47.0 %    MCV 94.2 (H) 80.0 - 94.0 fL    MCH 32.8 pg    MCHC 34.8 33.0 - 36.0 mg/dL    RDW 13.1 11.5 - 17.0 %    Platelet 309 130 " - 400 x10(3)/mcL    MPV 8.6 7.4 - 10.4 fL    Neut % 55.4 %    Lymph % 34.1 %    Mono % 7.9 %    Eos % 1.5 %    Basophil % 0.8 %    Lymph # 2.06 0.6 - 4.6 x10(3)/mcL    Neut # 3.34 2.1 - 9.2 x10(3)/mcL    Mono # 0.48 0.1 - 1.3 x10(3)/mcL    Eos # 0.09 0 - 0.9 x10(3)/mcL    Baso # 0.05 0 - 0.2 x10(3)/mcL    IG# 0.02 0 - 0.04 x10(3)/mcL    IG% 0.3 %    NRBC% 0.0 %   Iron and TIBC   Result Value Ref Range    Iron Binding Capacity Unsaturated 241 70 - 310 ug/dL    Iron Level 104 50 - 170 ug/dL    Transferrin 319 173 - 360 mg/dL    Iron Binding Capacity Total 345 250 - 450 ug/dL    Iron Saturation 30 20 - 50 %   Lipid Panel   Result Value Ref Range    Cholesterol Total 161 <=200 mg/dL    HDL Cholesterol 45 35 - 60 mg/dL    Triglyceride 278 (H) 37 - 140 mg/dL    Cholesterol/HDL Ratio 4 0 - 5    Very Low Density Lipoprotein 56     LDL Cholesterol 60.00 50.00 - 140.00 mg/dL   Hemoglobin A1C   Result Value Ref Range    Hemoglobin A1c 6.0 <=7.0 %    Estimated Average Glucose 125.5 mg/dL   Ferritin   Result Value Ref Range    Ferritin Level 274.93 (H) 4.63 - 204.00 ng/mL   Folate   Result Value Ref Range    Folate Level 15.4 7.0 - 31.4 ng/mL   Vitamin B12   Result Value Ref Range    Vitamin B12 Level 489 213 - 816 pg/mL     Na Rubio MD  [phone] 2/22/2023 STAT     Narrative & Impression  EXAMINATION:  XR NASAL BONES     CLINICAL HISTORY:  Epistaxis     COMPARISON:  None.     FINDINGS:  There is no nasal bone fracture identified.  The nasal septum is deviated towards the left.  The paranasal sinuses appear clear.     Impression:     No nasal bone fracture.        Electronically signed by: Na Rubio  Date:                                            02/22/2023  Time:                                           17:25           Exam Ended: 02/22/23 17:22 Last Resulted             Assessment:       1. Nosebleed    2. Contusion of nose, initial encounter            Plan:     Patient understands concern for nasal  contusion x-ray negative for nasal fracture.  Patient understands discharge plan with continued ice therapy nasal precautions and follow-up plan.  Patient declines prescription pain medication today.  Patient understands need for discussion with anesthesiologists and orthopedist tomorrow prior to any surgical procedure if allowed to proceed after recent trauma today.  Patient and  verbalized understanding satisfied ready for discharge now.    Recommend ice to nasal bridge 20 minutes out of the hour over the next 2 days to help reduce swelling and inflammation.  If nosebleed returns recommend Manny-Synephrine or Afrin nasal spray along with bilateral nasal pinching 20 minutes to help control nosebleeds.  Recommend orthopedist and anesthesiologist of acute nasal injury prior to surgical procedure tomorrow if able to continue with scheduled plan.  Recommend follow-up with ear nose and throat specialist in 1-2 weeks for nose injury re-evaluation and continued long-term care planning.  Nosebleed returns or unable to control recommend emergency department evaluation for injury and/or potential nasal packing if needed.  Nosebleed  -     XR NASAL BONES; Future; Expected date: 02/22/2023    Contusion of nose, initial encounter

## 2023-02-22 NOTE — PATIENT INSTRUCTIONS
Radiologist's final report x-ray negative for nasal fracture though high concern for acute nasal contusion continued monitoring for swelling and nosebleed.    Recommend ice to nasal bridge 20 minutes out of the hour over the next 2 days to help reduce swelling and inflammation.  Recommend Tylenol if needed for nasal pain avoid aspirin.  If nosebleed returns recommend Manny-Synephrine or Afrin nasal spray along with bilateral nasal pinching 20 minutes to help control nosebleeds.  Recommend orthopedist and anesthesiologist of acute nasal injury prior to surgical procedure tomorrow if able to continue with scheduled plan.  Recommend follow-up with ear nose and throat specialist in 1-2 weeks for nose injury re-evaluation and continued long-term care planning.  Nosebleed returns or unable to control recommend emergency department evaluation for injury and/or potential nasal packing if needed.

## 2023-02-23 ENCOUNTER — HOSPITAL ENCOUNTER (OUTPATIENT)
Facility: HOSPITAL | Age: 63
Discharge: HOME | End: 2023-02-23
Attending: ORTHOPAEDIC SURGERY | Admitting: ORTHOPAEDIC SURGERY
Payer: COMMERCIAL

## 2023-02-23 ENCOUNTER — ANESTHESIA (OUTPATIENT)
Dept: SURGERY | Facility: HOSPITAL | Age: 63
End: 2023-02-23
Payer: COMMERCIAL

## 2023-02-23 DIAGNOSIS — S83.241A OTHER TEAR OF MEDIAL MENISCUS OF RIGHT KNEE AS CURRENT INJURY, INITIAL ENCOUNTER: Primary | ICD-10-CM

## 2023-02-23 PROCEDURE — C1713 ANCHOR/SCREW BN/BN,TIS/BN: HCPCS | Performed by: ORTHOPAEDIC SURGERY

## 2023-02-23 PROCEDURE — 63600175 PHARM REV CODE 636 W HCPCS: Mod: TB,JG | Performed by: ORTHOPAEDIC SURGERY

## 2023-02-23 PROCEDURE — 37000008 HC ANESTHESIA 1ST 15 MINUTES: Performed by: ORTHOPAEDIC SURGERY

## 2023-02-23 PROCEDURE — 63600175 PHARM REV CODE 636 W HCPCS

## 2023-02-23 PROCEDURE — 63600175 PHARM REV CODE 636 W HCPCS: Performed by: STUDENT IN AN ORGANIZED HEALTH CARE EDUCATION/TRAINING PROGRAM

## 2023-02-23 PROCEDURE — 29882 PR KNEE SCOPE,MED OR LAT MENIS REPAIR: ICD-10-PCS | Mod: RT,,, | Performed by: ORTHOPAEDIC SURGERY

## 2023-02-23 PROCEDURE — 29881 ARTHRS KNE SRG MNISECTMY M/L: CPT | Mod: 59,51,RT, | Performed by: ORTHOPAEDIC SURGERY

## 2023-02-23 PROCEDURE — 29882 ARTHRS KNE SRG MNISC RPR M/L: CPT | Mod: RT,,, | Performed by: ORTHOPAEDIC SURGERY

## 2023-02-23 PROCEDURE — 25000003 PHARM REV CODE 250: Performed by: NURSE ANESTHETIST, CERTIFIED REGISTERED

## 2023-02-23 PROCEDURE — 29881 PR KNEE SCOPE SINGLE MENISECECTOMY: ICD-10-PCS | Mod: AS,RT,, | Performed by: NURSE PRACTITIONER

## 2023-02-23 PROCEDURE — 25000003 PHARM REV CODE 250: Performed by: ORTHOPAEDIC SURGERY

## 2023-02-23 PROCEDURE — 29881 PR KNEE SCOPE SINGLE MENISECECTOMY: ICD-10-PCS | Mod: 59,51,RT, | Performed by: ORTHOPAEDIC SURGERY

## 2023-02-23 PROCEDURE — 63600175 PHARM REV CODE 636 W HCPCS: Performed by: ORTHOPAEDIC SURGERY

## 2023-02-23 PROCEDURE — 71000016 HC POSTOP RECOV ADDL HR: Performed by: ORTHOPAEDIC SURGERY

## 2023-02-23 PROCEDURE — 37000009 HC ANESTHESIA EA ADD 15 MINS: Performed by: ORTHOPAEDIC SURGERY

## 2023-02-23 PROCEDURE — 36000711: Performed by: ORTHOPAEDIC SURGERY

## 2023-02-23 PROCEDURE — 63600175 PHARM REV CODE 636 W HCPCS: Performed by: NURSE ANESTHETIST, CERTIFIED REGISTERED

## 2023-02-23 PROCEDURE — 29881 ARTHRS KNE SRG MNISECTMY M/L: CPT | Mod: AS,RT,, | Performed by: NURSE PRACTITIONER

## 2023-02-23 PROCEDURE — 27201423 OPTIME MED/SURG SUP & DEVICES STERILE SUPPLY: Performed by: ORTHOPAEDIC SURGERY

## 2023-02-23 PROCEDURE — 71000033 HC RECOVERY, INTIAL HOUR: Performed by: ORTHOPAEDIC SURGERY

## 2023-02-23 PROCEDURE — 71000015 HC POSTOP RECOV 1ST HR: Performed by: ORTHOPAEDIC SURGERY

## 2023-02-23 PROCEDURE — 36000710: Performed by: ORTHOPAEDIC SURGERY

## 2023-02-23 DEVICE — PEEK SWIVELOCK C,4.75X19.1MM
Type: IMPLANTABLE DEVICE | Site: KNEE | Status: FUNCTIONAL
Brand: ARTHREX®

## 2023-02-23 DEVICE — MENISCAL ROOT KIT
Type: IMPLANTABLE DEVICE | Site: KNEE | Status: FUNCTIONAL
Brand: ARTHREX®

## 2023-02-23 RX ORDER — HYDROMORPHONE HYDROCHLORIDE 2 MG/ML
INJECTION, SOLUTION INTRAMUSCULAR; INTRAVENOUS; SUBCUTANEOUS
Status: COMPLETED
Start: 2023-02-23 | End: 2023-02-23

## 2023-02-23 RX ORDER — METOCLOPRAMIDE HYDROCHLORIDE 5 MG/ML
10 INJECTION INTRAMUSCULAR; INTRAVENOUS EVERY 10 MIN PRN
Status: DISCONTINUED | OUTPATIENT
Start: 2023-02-23 | End: 2023-02-23 | Stop reason: HOSPADM

## 2023-02-23 RX ORDER — METHOCARBAMOL 750 MG/1
750 TABLET, FILM COATED ORAL 3 TIMES DAILY
Qty: 21 TABLET | Refills: 0 | Status: SHIPPED | OUTPATIENT
Start: 2023-02-23 | End: 2023-03-27

## 2023-02-23 RX ORDER — BUPIVACAINE HYDROCHLORIDE 2.5 MG/ML
INJECTION, SOLUTION EPIDURAL; INFILTRATION; INTRACAUDAL
Status: DISCONTINUED
Start: 2023-02-23 | End: 2023-02-23 | Stop reason: HOSPADM

## 2023-02-23 RX ORDER — PROMETHAZINE HYDROCHLORIDE 25 MG/1
25 TABLET ORAL EVERY 6 HOURS PRN
Status: DISCONTINUED | OUTPATIENT
Start: 2023-02-23 | End: 2023-02-23 | Stop reason: HOSPADM

## 2023-02-23 RX ORDER — CEFAZOLIN SODIUM 2 G/100ML
2 INJECTION, SOLUTION INTRAVENOUS
Status: COMPLETED | OUTPATIENT
Start: 2023-02-23 | End: 2023-02-23

## 2023-02-23 RX ORDER — PROCHLORPERAZINE EDISYLATE 5 MG/ML
5 INJECTION INTRAMUSCULAR; INTRAVENOUS EVERY 30 MIN PRN
Status: DISCONTINUED | OUTPATIENT
Start: 2023-02-23 | End: 2023-02-23 | Stop reason: HOSPADM

## 2023-02-23 RX ORDER — EPINEPHRINE 1 MG/ML
INJECTION, SOLUTION, CONCENTRATE INTRAVENOUS
Status: DISCONTINUED | OUTPATIENT
Start: 2023-02-23 | End: 2023-02-23 | Stop reason: HOSPADM

## 2023-02-23 RX ORDER — KETOROLAC TROMETHAMINE 10 MG/1
10 TABLET, FILM COATED ORAL 3 TIMES DAILY
Qty: 15 TABLET | Refills: 0 | Status: SHIPPED | OUTPATIENT
Start: 2023-02-23 | End: 2023-03-27

## 2023-02-23 RX ORDER — ACETAMINOPHEN 10 MG/ML
INJECTION, SOLUTION INTRAVENOUS
Status: COMPLETED
Start: 2023-02-23 | End: 2023-02-23

## 2023-02-23 RX ORDER — FENTANYL CITRATE 50 UG/ML
INJECTION, SOLUTION INTRAMUSCULAR; INTRAVENOUS
Status: DISCONTINUED | OUTPATIENT
Start: 2023-02-23 | End: 2023-02-23

## 2023-02-23 RX ORDER — HYDROMORPHONE HYDROCHLORIDE 2 MG/ML
0.4 INJECTION, SOLUTION INTRAMUSCULAR; INTRAVENOUS; SUBCUTANEOUS EVERY 10 MIN PRN
Status: DISCONTINUED | OUTPATIENT
Start: 2023-02-23 | End: 2023-02-23 | Stop reason: HOSPADM

## 2023-02-23 RX ORDER — SODIUM CHLORIDE 0.9 % (FLUSH) 0.9 %
3 SYRINGE (ML) INJECTION EVERY 6 HOURS PRN
Status: DISCONTINUED | OUTPATIENT
Start: 2023-02-23 | End: 2023-02-23 | Stop reason: HOSPADM

## 2023-02-23 RX ORDER — HYDROCODONE BITARTRATE AND ACETAMINOPHEN 5; 325 MG/1; MG/1
1 TABLET ORAL EVERY 6 HOURS PRN
Qty: 20 TABLET | Refills: 0 | Status: SHIPPED | OUTPATIENT
Start: 2023-02-23 | End: 2023-02-27 | Stop reason: SDUPTHER

## 2023-02-23 RX ORDER — TRAMADOL HYDROCHLORIDE 50 MG/1
50 TABLET ORAL EVERY 4 HOURS PRN
Status: DISCONTINUED | OUTPATIENT
Start: 2023-02-23 | End: 2023-02-23 | Stop reason: HOSPADM

## 2023-02-23 RX ORDER — EPINEPHRINE 1 MG/ML
INJECTION, SOLUTION, CONCENTRATE INTRAVENOUS
Status: DISCONTINUED
Start: 2023-02-23 | End: 2023-02-23 | Stop reason: HOSPADM

## 2023-02-23 RX ORDER — BUPIVACAINE HYDROCHLORIDE 2.5 MG/ML
INJECTION, SOLUTION EPIDURAL; INFILTRATION; INTRACAUDAL
Status: DISCONTINUED | OUTPATIENT
Start: 2023-02-23 | End: 2023-02-23 | Stop reason: HOSPADM

## 2023-02-23 RX ORDER — SODIUM CHLORIDE 9 MG/ML
INJECTION, SOLUTION INTRAVENOUS CONTINUOUS
Status: DISCONTINUED | OUTPATIENT
Start: 2023-02-23 | End: 2023-02-23 | Stop reason: HOSPADM

## 2023-02-23 RX ORDER — ONDANSETRON 2 MG/ML
4 INJECTION INTRAMUSCULAR; INTRAVENOUS EVERY 12 HOURS PRN
Status: DISCONTINUED | OUTPATIENT
Start: 2023-02-23 | End: 2023-02-23 | Stop reason: HOSPADM

## 2023-02-23 RX ORDER — HYDROCODONE BITARTRATE AND ACETAMINOPHEN 5; 325 MG/1; MG/1
1 TABLET ORAL EVERY 4 HOURS PRN
Status: DISCONTINUED | OUTPATIENT
Start: 2023-02-23 | End: 2023-02-23 | Stop reason: HOSPADM

## 2023-02-23 RX ORDER — LIDOCAINE HCL/PF 100 MG/5ML
SYRINGE (ML) INTRAVENOUS
Status: DISCONTINUED | OUTPATIENT
Start: 2023-02-23 | End: 2023-02-23

## 2023-02-23 RX ORDER — DIPHENHYDRAMINE HYDROCHLORIDE 50 MG/ML
25 INJECTION INTRAMUSCULAR; INTRAVENOUS EVERY 6 HOURS PRN
Status: DISCONTINUED | OUTPATIENT
Start: 2023-02-23 | End: 2023-02-23 | Stop reason: HOSPADM

## 2023-02-23 RX ORDER — MORPHINE SULFATE 4 MG/ML
3 INJECTION, SOLUTION INTRAMUSCULAR; INTRAVENOUS
Status: DISCONTINUED | OUTPATIENT
Start: 2023-02-23 | End: 2023-02-23 | Stop reason: HOSPADM

## 2023-02-23 RX ORDER — MEPERIDINE HYDROCHLORIDE 25 MG/ML
12.5 INJECTION INTRAMUSCULAR; INTRAVENOUS; SUBCUTANEOUS EVERY 10 MIN PRN
Status: DISCONTINUED | OUTPATIENT
Start: 2023-02-23 | End: 2023-02-23 | Stop reason: HOSPADM

## 2023-02-23 RX ORDER — ONDANSETRON 2 MG/ML
INJECTION INTRAMUSCULAR; INTRAVENOUS
Status: DISCONTINUED | OUTPATIENT
Start: 2023-02-23 | End: 2023-02-23

## 2023-02-23 RX ORDER — PHENYLEPHRINE HYDROCHLORIDE 10 MG/ML
INJECTION INTRAVENOUS
Status: DISCONTINUED | OUTPATIENT
Start: 2023-02-23 | End: 2023-02-23

## 2023-02-23 RX ORDER — ACETAMINOPHEN 10 MG/ML
INJECTION, SOLUTION INTRAVENOUS
Status: DISCONTINUED | OUTPATIENT
Start: 2023-02-23 | End: 2023-02-23

## 2023-02-23 RX ORDER — KETOROLAC TROMETHAMINE 30 MG/ML
INJECTION, SOLUTION INTRAMUSCULAR; INTRAVENOUS
Status: DISCONTINUED | OUTPATIENT
Start: 2023-02-23 | End: 2023-02-23

## 2023-02-23 RX ORDER — PROPOFOL 10 MG/ML
INJECTION, EMULSION INTRAVENOUS
Status: DISCONTINUED | OUTPATIENT
Start: 2023-02-23 | End: 2023-02-23

## 2023-02-23 RX ORDER — DEXAMETHASONE SODIUM PHOSPHATE 4 MG/ML
INJECTION, SOLUTION INTRA-ARTICULAR; INTRALESIONAL; INTRAMUSCULAR; INTRAVENOUS; SOFT TISSUE
Status: DISCONTINUED | OUTPATIENT
Start: 2023-02-23 | End: 2023-02-23

## 2023-02-23 RX ADMIN — FENTANYL CITRATE 50 MCG: 50 INJECTION, SOLUTION INTRAMUSCULAR; INTRAVENOUS at 09:02

## 2023-02-23 RX ADMIN — HYDROMORPHONE HYDROCHLORIDE 0.4 MG: 2 INJECTION INTRAMUSCULAR; INTRAVENOUS; SUBCUTANEOUS at 09:02

## 2023-02-23 RX ADMIN — FENTANYL CITRATE 50 MCG: 50 INJECTION, SOLUTION INTRAMUSCULAR; INTRAVENOUS at 08:02

## 2023-02-23 RX ADMIN — SODIUM CHLORIDE, SODIUM GLUCONATE, SODIUM ACETATE, POTASSIUM CHLORIDE AND MAGNESIUM CHLORIDE: 526; 502; 368; 37; 30 INJECTION, SOLUTION INTRAVENOUS at 08:02

## 2023-02-23 RX ADMIN — ONDANSETRON HYDROCHLORIDE 4 MG: 2 SOLUTION INTRAMUSCULAR; INTRAVENOUS at 08:02

## 2023-02-23 RX ADMIN — LIDOCAINE HYDROCHLORIDE 60 MG: 20 INJECTION INTRAVENOUS at 08:02

## 2023-02-23 RX ADMIN — PHENYLEPHRINE HYDROCHLORIDE 100 MCG: 10 INJECTION INTRAVENOUS at 08:02

## 2023-02-23 RX ADMIN — ACETAMINOPHEN 1000 MG: 10 INJECTION, SOLUTION INTRAVENOUS at 08:02

## 2023-02-23 RX ADMIN — PROPOFOL 150 MG: 10 INJECTION, EMULSION INTRAVENOUS at 08:02

## 2023-02-23 RX ADMIN — DEXAMETHASONE SODIUM PHOSPHATE 4 MG: 4 INJECTION, SOLUTION INTRA-ARTICULAR; INTRALESIONAL; INTRAMUSCULAR; INTRAVENOUS; SOFT TISSUE at 08:02

## 2023-02-23 RX ADMIN — HYDROCODONE BITARTRATE AND ACETAMINOPHEN 1 TABLET: 5; 325 TABLET ORAL at 10:02

## 2023-02-23 RX ADMIN — KETOROLAC TROMETHAMINE 30 MG: 30 INJECTION, SOLUTION INTRAMUSCULAR at 09:02

## 2023-02-23 RX ADMIN — CEFAZOLIN SODIUM 2 G: 2 INJECTION, SOLUTION INTRAVENOUS at 08:02

## 2023-02-23 NOTE — PLAN OF CARE
Pt ready for discharge.. pt tolerated procedure well      Problem: Adult Inpatient Plan of Care  Goal: Plan of Care Review  Outcome: Met  Goal: Patient-Specific Goal (Individualized)  Outcome: Met  Goal: Absence of Hospital-Acquired Illness or Injury  Outcome: Met  Goal: Optimal Comfort and Wellbeing  Outcome: Met  Goal: Readiness for Transition of Care  Outcome: Met

## 2023-02-23 NOTE — ANESTHESIA POSTPROCEDURE EVALUATION
Anesthesia Post Evaluation    Patient: Nehal Araujo    Procedure(s) Performed: Procedure(s) (LRB):  ARTHROSCOPY,KNEE,WITH MENISCUS REPAIR (Right)    Final Anesthesia Type: general      Patient location during evaluation: PACU  Patient participation: Yes- Able to Participate  Level of consciousness: awake and alert  Post-procedure vital signs: reviewed and stable  Pain management: adequate  Airway patency: patent    PONV status at discharge: No PONV  Anesthetic complications: no      Respiratory status: unassisted  Hydration status: euvolemic  Follow-up not needed.        Vitals Value Taken Time   /78 02/23/23 1100   Temp 36.5 °C (97.7 °F) 02/23/23 0929   Pulse 80 02/23/23 1112   Resp 16 02/23/23 1030   SpO2 95 % 02/23/23 1112   Vitals shown include unvalidated device data.      Event Time   Out of Recovery 10:03:00         Pain/Matilda Score: Pain Rating Prior to Med Admin: 8 (2/23/2023 10:27 AM)  Pain Rating Post Med Admin: 4 (2/23/2023 11:00 AM)  Matilda Score: 10 (2/23/2023 10:14 AM)  Modified Matilda Score: 19 (2/23/2023 10:14 AM)

## 2023-02-23 NOTE — PATIENT INSTRUCTIONS
[facility]  Shay Daniels Jr., MD  [address], [address],   [place] [address]  [phone], fax [phone]    MEDIAL MENISCUS REPAIR    DIET:  Following general anesthesia, start with clear liquids to decrease chances of nausea.  Begin with water, coffee, tea, ginger ale, sprite, or apple juice.  If tolerated, advance to Jell-o, soup, crackers, or toast.  Once these are tolerated, advance to a regular diet.    DRESSING:  Keep the dressing clean and dry.  Remove the dressing on the 3rd day after surgery and replace the gauze with bandaids.  If you have steri-strips or band-aids in place of stitches, allow them to stay in place as long as possible.  They usually fall off on their own within 7-10 days.  You may trim the edges as the steri-strips begin to curl.  Steri-strips can get wet in the shower-pat dry with a towel after showers.    SHOWERING:  You may shower 5 days after surgery.  Remove the dressing or band-aids before showering.  Leave the incisions open to air after showering.  You can cover the sutures with band-aids if clothing irritates the stitches.  You do not need to reapply a dressing, but you may do so if you continue to have drainage.  It is not uncommon to have drainage a few days after surgery.      ACTIVITY:  -  Ice should be applied to the knee for 30 minutes, 5-6 times per day, for the 1st week to help decrease pain and swelling.  After the first week, apply as needed, especially                                 after exercises and physical therapy.  -  Elevate the knee with 2-3 pillows under the ANKLE.  Do not elevate with pillows under the knee, this will keep the knee in a bent position.  It is important that the knee can be fully straightened in the early post op period.  -  Use crutches following surgery  -  You will begin to bear weight on your leg with therapy.      PAIN MEDICATION:  -  Use the Percocet as prescribed for pain after  surgery.  Pain medicine can cause nausea and vomiting, especially on an empty stomach.  -  In addition, you can take Ketorolac as prescribed or Iburpofen 200 mg, 4 pills every 8 hours.  Ketorolac or Iburpofen medicine can irritate your stomach or cause heartburn.  If this happens to you, stop taking the medicine.  -  In addition, you can take Robaxin to help with muscle spasms.  -  Ice and elevation are more useful than pain medicine for surgical pain.  If you are having too much pain or discomfort, try more ice and higher elevation.  -  Do NOT drink alcohol while taking pain medication.    BLOOD CLOT PREVENTION:  If you are aware that you are at high risk for blood blots, notify your physician.  In general, you should walk s much as possible after surgery to increase blood circulation throughout your body. Please take Aspirin 81 mg, 1 pill twice a day for 2 weeks to help further prevent blood clots.    DRIVING OR FLYING:  You must NEVER drive while taking narcotic pain medication  You may ride in a car, take a train, or fly once you feel comfortable    PHYSICAL THERAPY:  Physical therapy will contact you 1-2 days after surgery to schedule a rehab appointment.  All exercises and activities must be within the protocol until rehab is complete.      WORK OR SCHOOL:  You may return to an office job or school whenver comfortable.  Most patients return ~ 1 week after surgery.  For more active jobs that require extended walking, squatting, or lifting, you can wait until after your follow up appointment.  Any other types of jobs should be discussed with Dr. Daniels to determine a date for return to work.    PROBLEMS TO REPORT:       1.  Fever greater than 102 F       2.  Incision that is very red and/or draining pus       3.  Unable to urinate within 8 hours of surgery (a rare effect of being put to sleep for surgery)  Calls should be directed to the clinic:  [phone]    RETURN APPOINTMENT:  To confirm or reschedule your  appointment, call [phone]

## 2023-02-23 NOTE — ANESTHESIA PREPROCEDURE EVALUATION
02/23/2023  Nehal Araujo is a 62 y.o., female.    PLT ct 309k  Na 143, K 3.7, Cr 0.83    Recent fall with nosebleed yesterday.  Went to urgent care, though nosebleed stopped spontaneously after 30m.  Some very mild bruising to midface and left eye.  Safe to proceed to OR today.      Hong SEE     Pre-op Assessment    I have reviewed the Patient Summary Reports.     I have reviewed the Nursing Notes. I have reviewed the NPO Status.   I have reviewed the Medications.     Review of Systems  Anesthesia Hx:   Denies Personal Hx of Anesthesia complications.   Cardiovascular:   Hypertension hyperlipidemia    Pulmonary:  Pulmonary Normal    Hepatic/GI:   GERD, well controlled    Musculoskeletal:   Arthritis     Neurological:   Neuromuscular Disease, (radiculopathy)    Endocrine:  Obesity / BMI > 30      Physical Exam  General: Well nourished, Cooperative and Alert    Airway:  Mallampati: II   Mouth Opening: Normal  TM Distance: Normal  Tongue: Normal    Chest/Lungs:  Normal Respiratory Rate    Heart:  Rate: Normal        Anesthesia Plan  Type of Anesthesia, risks & benefits discussed:    Anesthesia Type: Gen Supraglottic Airway  Intra-op Monitoring Plan: Standard ASA Monitors  Post Op Pain Control Plan: multimodal analgesia  Induction:  IV  Informed Consent: Informed consent signed with the Patient and all parties understand the risks and agree with anesthesia plan.  All questions answered.   ASA Score: 2  Day of Surgery Review of History & Physical: H&P Update referred to the surgeon/provider.    Ready For Surgery From Anesthesia Perspective.     .

## 2023-02-23 NOTE — ANESTHESIA PROCEDURE NOTES
Intubation    Date/Time: 2/23/2023 8:26 AM  Performed by: Luis Sheikh CRNA  Authorized by: Luis Sheikh CRNA     Intubation:     Induction:  Intravenous    Intubated:  Postinduction    Mask Ventilation:  Easy mask    Attempts:  1    Attempted By:  CRNA    Difficult Airway Encountered?: No      Complications:  None    Airway Device:  Supraglottic airway/LMA    Airway Device Size:  4.0    Style/Cuff Inflation:  Cuffed (inflated to minimal occlusive pressure)    Inflation Amount (mL):  18    Placement Verified By:  Capnometry    Complicating Factors:  None    Findings Post-Intubation:  BS equal bilateral

## 2023-02-23 NOTE — DISCHARGE SUMMARY
[facility] - Periop Services  Discharge Note  Short Stay    Procedure(s) (LRB):  ARTHROSCOPY,KNEE,WITH MENISCUS REPAIR (Right)      OUTCOME: Patient tolerated treatment/procedure well without complication and is now ready for discharge.    DISPOSITION: Home or Self Care    FINAL DIAGNOSIS:  <principal problem not specified>    FOLLOWUP: In clinic    DISCHARGE INSTRUCTIONS:  No discharge procedures on file.     TIME SPENT ON DISCHARGE: 10 minutes

## 2023-02-23 NOTE — OP NOTE
DATE OF SERVICE: 02/23/2023    SURGEON: Shay Daniels MD     ASSISTANT: Francesca Wills NP. Mrs. Wills was essential in manipulating the leg while I performed the arthroscopy, retraction, and closure.    PREOPERATIVE DIAGNOSIS:   Right knee medial meniscus tear    POSTOPERATIVE DIAGNOSIS:   Right knee medial and lateral meniscus tear    PROCEDURE PERFORMED:  1. Right knee arthroscopic medial meniscus root repair  2. Right knee arthroscopic partial lateral menisectomy    ESTIMATED BLOOD LOSS: 10cc.    COMPLICATIONS: None.    TOURNIQUET TIME: 25 minutes.    ANTIBIOTICS: Ancef     INDICATIONS FOR PROCEDURE: Nehal Araujo is a 62 y.o. year old who has had ongoing right knee pain. The patient has had to limit activity due to intermittent pain & occasional mechanical symptoms. An MRI was performed that showed a meniscus tear that I felt would be amenable to arthroscopic repair. The patient decided to opt for surgery after failing conservative management.    OPERATIVE REPORT: Nehal Araujo was initially seen in the preoperative holding area where history and physical were reviewed without change. The operative leg was marked, consents were reviewed, and any questions were answered for the patient and family. The patient was then taken back to the operating room, placed supine on the operating table with all bony prominences padded. Then a nonsterile tourniquet was placed around the upper thigh. The patient was induced under general anesthesia. The operative lower extremity was prepped and draped in standard sterile fashion. A timeout led by the surgeon was performed, and preoperative antibiotics were given. The limp was exsanguinated with gravity. The tourniquet was raised to 100 mmHg over the systolic blood pressure.    The knee was then flexed and the inferolateral portal was created with an #11 blade scalpel.    The camera was introduced, using the blunt trocar, into the suprapatellar pouch. The undersurface of the patella was  found to have no chondral wear and the trochlear groove was found to have no chondral wear . The camera was then taken down into the lateral gutter, where no loose bodies and no plica were found. The camera was then brought into the medial gutter, where no loose bodies and no plica were seen. The camera was then brought into the medial compartment.    An inferomedial portal was then localized with a spinal needle, and created using an #11 blade. The medial meniscus was probed and found to have a radial tear of the posterior root. I used a curette in order to stimulate the underlying bone and cartilage.  I then placed 2 loop sutures through the posterior horn of the medial meniscus.  I used an aiming guide in order to place a  socket at the insertion of the posterior meniscal root.  I then was able to shuttle the sutures down into the socket and out the anterior aspect of the tibia.  I secured the sutures on the anterior aspect of the tibia using a SwiveLock anchor. The medial femoral condyle was found to have scattered grade 1/2. The medial tibial plateau demonstrated minimal wear.    The camera was then turned to the notch, where the ACL was found to be intact.    Then the leg was brought into figure-of-four position. The camera was brought into the lateral compartment. The lateral meniscus was probed and found to have partial radial tear of the posterior horn. A combination of baskets and keily were used to debride the meniscal flap down to a stable margin of approximately 90 percent remaining and then shaver was used to smooth the edges. The lateral femoral condyle was found to have no chondral wear. The lateral tibial plateau had no chondral wear.    The knee was once more examined for loose bodies, of which none were found. The knee was then evacuated of all fluids. The portals were closed with 3-0 monocyrl interrupted sutures and steristrips. 10mL of 0.25% Bupivicaine were infiltrated around each portal. A  sterile dressing was placed, and the tourniquet was deflated after 25 minutes. The patient was awakened from anesthesia and taken to the postoperative care unit in stable condition.

## 2023-02-24 VITALS
DIASTOLIC BLOOD PRESSURE: 72 MMHG | WEIGHT: 171.06 LBS | OXYGEN SATURATION: 92 % | HEART RATE: 78 BPM | BODY MASS INDEX: 30.31 KG/M2 | HEIGHT: 63 IN | SYSTOLIC BLOOD PRESSURE: 142 MMHG | TEMPERATURE: 98 F | RESPIRATION RATE: 16 BRPM

## 2023-02-27 RX ORDER — HYDROCODONE BITARTRATE AND ACETAMINOPHEN 5; 325 MG/1; MG/1
1 TABLET ORAL EVERY 6 HOURS PRN
Qty: 28 TABLET | Refills: 0 | Status: SHIPPED | OUTPATIENT
Start: 2023-02-27 | End: 2023-03-07 | Stop reason: SDUPTHER

## 2023-03-01 ENCOUNTER — PATIENT MESSAGE (OUTPATIENT)
Dept: ADMINISTRATIVE | Facility: HOSPITAL | Age: 63
End: 2023-03-01
Payer: COMMERCIAL

## 2023-03-02 NOTE — PROGRESS NOTES
Chief Complaint:   Chief Complaint   Patient presents with    Right Knee - Post-op Evaluation    Post-op Evaluation     1 week sp post op ATS Rt knee w/meniscus repair 2/23/23-5 /24/23 pt ambulating with crutches pt states knee is fine some pain, taking pain meds,    Post-op Evaluation     pt going to PT 2 days a week        History of present illness:  2/23/23: Right knee arthroscopic medial meniscus root repair, partial lateral menisectomy    She returns today. Her pain is under good control. Mostly compliant with crutches. Working with therapy.     Musculoskeletal:   Right knee incisions healing, steri strips intact. Without erythema, drainage, or signs of infection. + effusion. Distally neurovascular intact.        Assessment: Status post medial meniscus repair    Other orders  -     methocarbamoL (ROBAXIN) 750 MG Tab; Take 1 tablet (750 mg total) by mouth 3 (three) times daily. for 7 days  Dispense: 21 tablet; Refill: 0        Plan:  Doing well s/p above. Continue formal therapy. Follow up in 4 weeks for ROM check.

## 2023-03-03 ENCOUNTER — OFFICE VISIT (OUTPATIENT)
Dept: ORTHOPEDICS | Facility: CLINIC | Age: 63
End: 2023-03-03
Payer: COMMERCIAL

## 2023-03-03 VITALS
TEMPERATURE: 99 F | HEIGHT: 63 IN | WEIGHT: 167 LBS | SYSTOLIC BLOOD PRESSURE: 124 MMHG | HEART RATE: 83 BPM | BODY MASS INDEX: 29.59 KG/M2 | DIASTOLIC BLOOD PRESSURE: 82 MMHG

## 2023-03-03 DIAGNOSIS — Z98.890 STATUS POST MEDIAL MENISCUS REPAIR: Primary | ICD-10-CM

## 2023-03-03 PROCEDURE — 3074F SYST BP LT 130 MM HG: CPT | Mod: CPTII,,, | Performed by: NURSE PRACTITIONER

## 2023-03-03 PROCEDURE — 1159F PR MEDICATION LIST DOCUMENTED IN MEDICAL RECORD: ICD-10-PCS | Mod: CPTII,,, | Performed by: NURSE PRACTITIONER

## 2023-03-03 PROCEDURE — 99024 PR POST-OP FOLLOW-UP VISIT: ICD-10-PCS | Mod: ,,, | Performed by: NURSE PRACTITIONER

## 2023-03-03 PROCEDURE — 3008F PR BODY MASS INDEX (BMI) DOCUMENTED: ICD-10-PCS | Mod: CPTII,,, | Performed by: NURSE PRACTITIONER

## 2023-03-03 PROCEDURE — 3074F PR MOST RECENT SYSTOLIC BLOOD PRESSURE < 130 MM HG: ICD-10-PCS | Mod: CPTII,,, | Performed by: NURSE PRACTITIONER

## 2023-03-03 PROCEDURE — 3008F BODY MASS INDEX DOCD: CPT | Mod: CPTII,,, | Performed by: NURSE PRACTITIONER

## 2023-03-03 PROCEDURE — 3079F DIAST BP 80-89 MM HG: CPT | Mod: CPTII,,, | Performed by: NURSE PRACTITIONER

## 2023-03-03 PROCEDURE — 99024 POSTOP FOLLOW-UP VISIT: CPT | Mod: ,,, | Performed by: NURSE PRACTITIONER

## 2023-03-03 PROCEDURE — 1159F MED LIST DOCD IN RCRD: CPT | Mod: CPTII,,, | Performed by: NURSE PRACTITIONER

## 2023-03-03 PROCEDURE — 3079F PR MOST RECENT DIASTOLIC BLOOD PRESSURE 80-89 MM HG: ICD-10-PCS | Mod: CPTII,,, | Performed by: NURSE PRACTITIONER

## 2023-03-03 RX ORDER — METHOCARBAMOL 750 MG/1
750 TABLET, FILM COATED ORAL 3 TIMES DAILY
Qty: 21 TABLET | Refills: 0 | Status: SHIPPED | OUTPATIENT
Start: 2023-03-03 | End: 2023-03-10

## 2023-03-07 RX ORDER — HYDROCODONE BITARTRATE AND ACETAMINOPHEN 5; 325 MG/1; MG/1
1 TABLET ORAL EVERY 6 HOURS PRN
Qty: 28 TABLET | Refills: 0 | Status: SHIPPED | OUTPATIENT
Start: 2023-03-07 | End: 2023-07-05

## 2023-03-27 ENCOUNTER — OFFICE VISIT (OUTPATIENT)
Dept: FAMILY MEDICINE | Facility: CLINIC | Age: 63
End: 2023-03-27
Payer: COMMERCIAL

## 2023-03-27 VITALS
BODY MASS INDEX: 30.86 KG/M2 | WEIGHT: 174.19 LBS | DIASTOLIC BLOOD PRESSURE: 73 MMHG | SYSTOLIC BLOOD PRESSURE: 123 MMHG | HEIGHT: 63 IN | OXYGEN SATURATION: 99 % | TEMPERATURE: 99 F | HEART RATE: 86 BPM | RESPIRATION RATE: 20 BRPM

## 2023-03-27 DIAGNOSIS — R73.03 PRE-DIABETES: Chronic | ICD-10-CM

## 2023-03-27 DIAGNOSIS — Z12.83 SKIN CANCER SCREENING: ICD-10-CM

## 2023-03-27 DIAGNOSIS — E78.2 MIXED HYPERLIPIDEMIA: Chronic | ICD-10-CM

## 2023-03-27 DIAGNOSIS — Z13.6 ENCOUNTER FOR SCREENING FOR CARDIOVASCULAR DISORDERS: ICD-10-CM

## 2023-03-27 DIAGNOSIS — I10 PRIMARY HYPERTENSION: Primary | Chronic | ICD-10-CM

## 2023-03-27 DIAGNOSIS — M79.10 MYALGIA: ICD-10-CM

## 2023-03-27 PROCEDURE — 3008F BODY MASS INDEX DOCD: CPT | Mod: CPTII,,, | Performed by: FAMILY MEDICINE

## 2023-03-27 PROCEDURE — 1160F PR REVIEW ALL MEDS BY PRESCRIBER/CLIN PHARMACIST DOCUMENTED: ICD-10-PCS | Mod: CPTII,,, | Performed by: FAMILY MEDICINE

## 2023-03-27 PROCEDURE — 4010F ACE/ARB THERAPY RXD/TAKEN: CPT | Mod: CPTII,,, | Performed by: FAMILY MEDICINE

## 2023-03-27 PROCEDURE — 3074F PR MOST RECENT SYSTOLIC BLOOD PRESSURE < 130 MM HG: ICD-10-PCS | Mod: CPTII,,, | Performed by: FAMILY MEDICINE

## 2023-03-27 PROCEDURE — 3078F DIAST BP <80 MM HG: CPT | Mod: CPTII,,, | Performed by: FAMILY MEDICINE

## 2023-03-27 PROCEDURE — 99214 PR OFFICE/OUTPT VISIT, EST, LEVL IV, 30-39 MIN: ICD-10-PCS | Mod: ,,, | Performed by: FAMILY MEDICINE

## 2023-03-27 PROCEDURE — 3074F SYST BP LT 130 MM HG: CPT | Mod: CPTII,,, | Performed by: FAMILY MEDICINE

## 2023-03-27 PROCEDURE — 1159F PR MEDICATION LIST DOCUMENTED IN MEDICAL RECORD: ICD-10-PCS | Mod: CPTII,,, | Performed by: FAMILY MEDICINE

## 2023-03-27 PROCEDURE — 4010F PR ACE/ARB THEARPY RXD/TAKEN: ICD-10-PCS | Mod: CPTII,,, | Performed by: FAMILY MEDICINE

## 2023-03-27 PROCEDURE — 1159F MED LIST DOCD IN RCRD: CPT | Mod: CPTII,,, | Performed by: FAMILY MEDICINE

## 2023-03-27 PROCEDURE — 1160F RVW MEDS BY RX/DR IN RCRD: CPT | Mod: CPTII,,, | Performed by: FAMILY MEDICINE

## 2023-03-27 PROCEDURE — 3078F PR MOST RECENT DIASTOLIC BLOOD PRESSURE < 80 MM HG: ICD-10-PCS | Mod: CPTII,,, | Performed by: FAMILY MEDICINE

## 2023-03-27 PROCEDURE — 99214 OFFICE O/P EST MOD 30 MIN: CPT | Mod: ,,, | Performed by: FAMILY MEDICINE

## 2023-03-27 PROCEDURE — 3008F PR BODY MASS INDEX (BMI) DOCUMENTED: ICD-10-PCS | Mod: CPTII,,, | Performed by: FAMILY MEDICINE

## 2023-03-27 RX ORDER — OLMESARTAN MEDOXOMIL 40 MG/1
40 TABLET ORAL DAILY
Qty: 30 TABLET | Refills: 3 | Status: SHIPPED | OUTPATIENT
Start: 2023-03-27 | End: 2023-07-17

## 2023-03-27 NOTE — PROGRESS NOTES
Patient ID: [id number]     Chief Complaint: Establish Care      HPI:     Nehal Araujo is a 62 y.o. female here today -establish care with PCP.  Stressors related to previous physician discussed.  Patient with acute concerns.   1) Hypertension: Patient has been taking medicine daily-she  is very concerned about the number of medications that she is taking for high blood pressure.  No symptoms associated with increased BP such as headache/ visual changes/ dizziness/ chest pain.   2) Reflux: Patient is taking medication for reflux-no symptoms associated with reflux.  No noticeable side effects of medication.  3) Hyperlipidemia: Patient is taking medication at Night. No side effects of medication noted.  4) Episodes of elevated temperature/chills/muscle ache:  Has been having episodes of elevated temperature  degrees-with muscle ache/chills- for years-after recent knee surgery feels that symptoms are more frequent.  Usually improves after taking over-the-counter Tylenol or ibuprofen.  No other associated systemic symptoms.        Past Medical History:   Diagnosis Date    Class 1 obesity with serious comorbidity and body mass index (BMI) of 30.0 to 30.9 in adult 2022    History of gout 2022    Hyperlipidemia     Hypertension     Lumbar degenerative disc disease 2023    Pre-diabetes 2022    Primary osteoarthritis of right knee 2023        Past Surgical History:   Procedure Laterality Date    ARTHROSCOPY,KNEE,WITH MENISCUS REPAIR Right 2023    Procedure: ARTHROSCOPY,KNEE,WITH MENISCUS REPAIR;  Surgeon: Shay Daniels Jr., MD;  Location: [facility];  Service: Orthopedics;  Laterality: Right;    BUNIONECTOMY Bilateral      SECTION      x 3    CHOLECYSTECTOMY      COLECTOMY      ENDOMETRIAL ABLATION      thumb surgery      TONSILLECTOMY          Social History     Tobacco Use    Smoking status: Former     Packs/day: 0.75     Years: 15.00     Pack years: 11.25     Types: Cigarettes     Quit  "date:      Years since quittin.2    Smokeless tobacco: Never   Substance Use Topics    Alcohol use: Yes     Alcohol/week: 5.0 standard drinks     Types: 5 Drinks containing 0.5 oz of alcohol per week     Comment: soc    Drug use: Never        Social History     Socioeconomic History    Marital status:      Spouse name: Hardik    Number of children: 3        Current Outpatient Medications   Medication Instructions    atorvastatin (LIPITOR) 40 mg, Oral, Daily    cloNIDine (CATAPRES) 0.1 mg, Oral, 2 times daily    HYDROcodone-acetaminophen (NORCO) 5-325 mg per tablet 1 tablet, Oral, Every 6 hours PRN    icosapent ethyL (VASCEPA) 2 g, Oral, 2 times daily    meloxicam (MOBIC) 15 mg, Oral    NIFEdipine (PROCARDIA-XL) 60 mg, Oral, Daily    olmesartan (BENICAR) 40 mg, Oral, Daily    omeprazole (PRILOSEC) 20 mg, Oral, Daily       Review of patient's allergies indicates:   Allergen Reactions    Percocet [oxycodone-acetaminophen] Itching        Patient Care Team:  Angela Baez MD as PCP - General (Family Medicine)       Subjective:     Review of Systems    12 point review of systems conducted, negative except as stated in the history of present illness. See HPI for details.      Objective:     Visit Vitals  /73 (BP Location: Right arm, Patient Position: Sitting, BP Method: Medium (Automatic))   Pulse 86   Temp 98.7 °F (37.1 °C) (Oral)   Resp 20   Ht 5' 3" (1.6 m)   Wt 79 kg (174 lb 3.2 oz)   SpO2 99%   BMI 30.86 kg/m²       Physical Exam    General: Alert and oriented, No acute distress.  Head: Normocephalic, Atraumatic.  Eye: Pupils are equal, round and reactive to light, Extraocular movements are intact, Sclera non-icteric.  Ears/Nose/Throat: Normal, Mucosa moist,Clear.  Neck/Thyroid: Supple, Non-tender, No carotid bruit, No lymphadenopathy,Full range of motion.  Respiratory: Clear to auscultation bilaterally  Cardiovascular: Regular rate and rhythm  Gastrointestinal: Soft, Non-tender, Non-distended, " Normal bowel sounds, No palpable organomegaly.  Musculoskeletal: Normal range of motion.  Integumentary: Warm, Dry, Intact, No suspicious lesions or rashes.  Extremities: No clubbing, cyanosis or edema  Neurologic: No focal deficits, Cranial Nerves II-XII are grossly intact, Motor strength normal upper and lower extremities, Sensory exam intact.  Psychiatric: Normal interaction, Coherent speech, Euthymic mood, Appropriate affect       Assessment:       ICD-10-CM ICD-9-CM   1. Primary hypertension  I10 401.9   2. Pre-diabetes  R73.03 790.29   3. Mixed hyperlipidemia  E78.2 272.2   4. Encounter for screening for cardiovascular disorders  Z13.6 V81.2   5. Myalgia  M79.10 729.1   6. Skin cancer screening  Z12.83 V76.43        Plan:     1. Primary hypertension  Patient has been taking medication. Blood pressure goal of less than 130/80-blood pressure is stable. Continue to encourage diet and activity modifications.  Medication modifications discussed at length-patient to stop losartan-Rx olmesartan 40 mg-call office with blood pressure readings-based on blood pressure reading modifications discussed.  Pathophysiology/treatment/dangers discussed.    2. Pre-diabetes   Hemoglobin A1c 6.0  Normal less than 5.7 - Diagnosis of Type 2 Diabetes Mellitus at 6.5. Encourage diet and activity modification- Pathophysiology/treatment/dangers discussed.      3. Mixed hyperlipidemia  Lab Results   Component Value Date    CHOL 161 02/06/2023    LDL 60.00 02/06/2023    TRIG 278 (H) 02/06/2023    HDL 45 02/06/2023     Pathophysiology/treatment/dangers discussed. Patient to continue diet modification.   Total cholesterol 161 ( Goal less than 200) HDL 45 ( Goal high as possible) LDL 60 ( Goal less than 100) Triglycerides 278 ( Goal less than 150)      4. Encounter for screening for cardiovascular disorders  Check studies management based upon results.  Pathophysiology/treatment/dangers discussed.    - CT Calcium Scoring Cardiac; Future    5.  Myalgia  Check studies management based upon results.  Pathophysiology/treatment/dangers discussed.    - CYCLIC CITRULLINATED PEPTIDE (CCP) ANTIBODY; Future  - Lupus Comprehensive Panel; Future  - Sedimentation rate; Future  - Rheumatoid Factors, IgA, IgG, IgM; Future    6. Skin Cancer screening:   Patient to be referred to Dermatology.  Follow up in about 6 weeks (around 5/8/2023) for HTN. In addition to their scheduled follow up, the patient has also been instructed to follow up on as needed basis.     Future Appointments   Date Time Provider Department Center   3/29/2023  8:45 AM Shay Daniels Jr., MD [facility] [place]   4/4/2023  2:15 PM Keysha Sood MD [facility] [place]   5/8/2023  3:00 PM [facility]   5/9/2023  2:20 PM [place]eli [place]enton [place] [facility] [place] [facility] [place] Baez [place]     Scalpel Size: 15 blade

## 2023-03-29 ENCOUNTER — OFFICE VISIT (OUTPATIENT)
Dept: ORTHOPEDICS | Facility: CLINIC | Age: 63
End: 2023-03-29
Payer: COMMERCIAL

## 2023-03-29 DIAGNOSIS — Z98.890 STATUS POST MEDIAL MENISCUS REPAIR: Primary | ICD-10-CM

## 2023-03-29 PROCEDURE — 99024 PR POST-OP FOLLOW-UP VISIT: ICD-10-PCS | Mod: ,,, | Performed by: ORTHOPAEDIC SURGERY

## 2023-03-29 PROCEDURE — 99024 POSTOP FOLLOW-UP VISIT: CPT | Mod: ,,, | Performed by: ORTHOPAEDIC SURGERY

## 2023-03-29 PROCEDURE — 4010F ACE/ARB THERAPY RXD/TAKEN: CPT | Mod: CPTII,,, | Performed by: ORTHOPAEDIC SURGERY

## 2023-03-29 PROCEDURE — 1159F MED LIST DOCD IN RCRD: CPT | Mod: CPTII,,, | Performed by: ORTHOPAEDIC SURGERY

## 2023-03-29 PROCEDURE — 4010F PR ACE/ARB THEARPY RXD/TAKEN: ICD-10-PCS | Mod: CPTII,,, | Performed by: ORTHOPAEDIC SURGERY

## 2023-03-29 PROCEDURE — 1159F PR MEDICATION LIST DOCUMENTED IN MEDICAL RECORD: ICD-10-PCS | Mod: CPTII,,, | Performed by: ORTHOPAEDIC SURGERY

## 2023-03-29 NOTE — PROGRESS NOTES
Chief Complaint:   Chief Complaint   Patient presents with    Right Knee - Follow-up     5wk sp ATS rt knee w/meniscus repair GL 2/23/23-5/24/23. Pt states the knee is doing great and is going to therapy which is helping       History of present illness:  2/23/23: Right knee arthroscopic medial meniscus root repair, partial lateral menisectomy    She returns today. Her pain is under good control. Working with therapy.     Musculoskeletal:   Right knee incisions healed.  Range of motion full. + effusion. Distally neurovascular intact.        Assessment: Status post medial meniscus repair        Plan:  Doing well s/p above. Continue formal therapy. Follow up in 6 weeks for ROM check.

## 2023-04-11 ENCOUNTER — OFFICE VISIT (OUTPATIENT)
Dept: FAMILY MEDICINE | Facility: CLINIC | Age: 63
End: 2023-04-11
Payer: COMMERCIAL

## 2023-04-11 VITALS
BODY MASS INDEX: 30.8 KG/M2 | OXYGEN SATURATION: 98 % | HEIGHT: 63 IN | HEART RATE: 96 BPM | SYSTOLIC BLOOD PRESSURE: 124 MMHG | RESPIRATION RATE: 20 BRPM | WEIGHT: 173.81 LBS | DIASTOLIC BLOOD PRESSURE: 73 MMHG | TEMPERATURE: 99 F

## 2023-04-11 DIAGNOSIS — I10 PRIMARY HYPERTENSION: Chronic | ICD-10-CM

## 2023-04-11 DIAGNOSIS — R93.1 ELEVATED CORONARY ARTERY CALCIUM SCORE: Primary | ICD-10-CM

## 2023-04-11 PROCEDURE — 3074F SYST BP LT 130 MM HG: CPT | Mod: CPTII,,, | Performed by: FAMILY MEDICINE

## 2023-04-11 PROCEDURE — 3008F PR BODY MASS INDEX (BMI) DOCUMENTED: ICD-10-PCS | Mod: CPTII,,, | Performed by: FAMILY MEDICINE

## 2023-04-11 PROCEDURE — 99213 PR OFFICE/OUTPT VISIT, EST, LEVL III, 20-29 MIN: ICD-10-PCS | Mod: ,,, | Performed by: FAMILY MEDICINE

## 2023-04-11 PROCEDURE — 99213 OFFICE O/P EST LOW 20 MIN: CPT | Mod: ,,, | Performed by: FAMILY MEDICINE

## 2023-04-11 PROCEDURE — 1160F RVW MEDS BY RX/DR IN RCRD: CPT | Mod: CPTII,,, | Performed by: FAMILY MEDICINE

## 2023-04-11 PROCEDURE — 1159F PR MEDICATION LIST DOCUMENTED IN MEDICAL RECORD: ICD-10-PCS | Mod: CPTII,,, | Performed by: FAMILY MEDICINE

## 2023-04-11 PROCEDURE — 3008F BODY MASS INDEX DOCD: CPT | Mod: CPTII,,, | Performed by: FAMILY MEDICINE

## 2023-04-11 PROCEDURE — 4010F PR ACE/ARB THEARPY RXD/TAKEN: ICD-10-PCS | Mod: CPTII,,, | Performed by: FAMILY MEDICINE

## 2023-04-11 PROCEDURE — 1160F PR REVIEW ALL MEDS BY PRESCRIBER/CLIN PHARMACIST DOCUMENTED: ICD-10-PCS | Mod: CPTII,,, | Performed by: FAMILY MEDICINE

## 2023-04-11 PROCEDURE — 3074F PR MOST RECENT SYSTOLIC BLOOD PRESSURE < 130 MM HG: ICD-10-PCS | Mod: CPTII,,, | Performed by: FAMILY MEDICINE

## 2023-04-11 PROCEDURE — 3078F PR MOST RECENT DIASTOLIC BLOOD PRESSURE < 80 MM HG: ICD-10-PCS | Mod: CPTII,,, | Performed by: FAMILY MEDICINE

## 2023-04-11 PROCEDURE — 3078F DIAST BP <80 MM HG: CPT | Mod: CPTII,,, | Performed by: FAMILY MEDICINE

## 2023-04-11 PROCEDURE — 4010F ACE/ARB THERAPY RXD/TAKEN: CPT | Mod: CPTII,,, | Performed by: FAMILY MEDICINE

## 2023-04-11 PROCEDURE — 1159F MED LIST DOCD IN RCRD: CPT | Mod: CPTII,,, | Performed by: FAMILY MEDICINE

## 2023-04-11 RX ORDER — ZOSTER VACCINE RECOMBINANT, ADJUVANTED 50 MCG/0.5
0.5 KIT INTRAMUSCULAR ONCE
Qty: 1 EACH | Refills: 0 | Status: SHIPPED | OUTPATIENT
Start: 2023-04-11 | End: 2023-04-11

## 2023-04-11 NOTE — PROGRESS NOTES
Patient ID: [id number]     Chief Complaint: Follow-up (Discuss Calcium Score Results)      HPI:     Nehal Araujo is a 62 y.o. female here today to discuss test results.  1) Hypertension: Patient has been taking medicine daily. BP is normal at home. No symptoms associated with increased BP such as headache/ visual changes/ dizziness/ chest pain.  Patient has stopped taking 2 medications.    Past Medical History:   Diagnosis Date    Class 1 obesity with serious comorbidity and body mass index (BMI) of 30.0 to 30.9 in adult 2022    History of gout 2022    Hyperlipidemia     Hypertension     Lumbar degenerative disc disease 2023    Pre-diabetes 2022    Primary osteoarthritis of right knee 2023        Past Surgical History:   Procedure Laterality Date    ARTHROSCOPY,KNEE,WITH MENISCUS REPAIR Right 2023    Procedure: ARTHROSCOPY,KNEE,WITH MENISCUS REPAIR;  Surgeon: Shay Daniels Jr., MD;  Location: [facility];  Service: Orthopedics;  Laterality: Right;    BUNIONECTOMY Bilateral      SECTION      x 3    CHOLECYSTECTOMY      COLECTOMY      ENDOMETRIAL ABLATION      thumb surgery      TONSILLECTOMY          Social History     Tobacco Use    Smoking status: Former     Packs/day: 0.75     Years: 15.00     Pack years: 11.25     Types: Cigarettes     Quit date:      Years since quittin.2    Smokeless tobacco: Never   Substance Use Topics    Alcohol use: Yes     Alcohol/week: 5.0 standard drinks     Types: 5 Drinks containing 0.5 oz of alcohol per week     Comment: sociallly    Drug use: Never        Social History     Socioeconomic History    Marital status:      Spouse name: Hardik    Number of children: 3        Current Outpatient Medications   Medication Instructions    atorvastatin (LIPITOR) 40 mg, Oral, Daily    HYDROcodone-acetaminophen (NORCO) 5-325 mg per tablet 1 tablet, Oral, Every 6 hours PRN    icosapent ethyL (VASCEPA) 2 g, Oral, 2 times daily    meloxicam (MOBIC) 15  "mg, Oral    olmesartan (BENICAR) 40 mg, Oral, Daily    omeprazole (PRILOSEC) 20 mg, Oral, Daily    varicella-zoster [id number], PF, (SHINGRIX, PF,) 50 mcg/0.5 mL injection 0.5 mLs, Intramuscular, Once       Review of patient's allergies indicates:   Allergen Reactions    Percocet [oxycodone-acetaminophen] Itching        Patient Care Team:  Angela Baez MD as PCP - General (Family Medicine)       Subjective:     Review of Systems    12 point review of systems conducted, negative except as stated in the history of present illness. See HPI for details.      Objective:     Visit Vitals  /73 (BP Location: Left arm)   Pulse 96   Temp 99.2 °F (37.3 °C) (Oral)   Resp 20   Ht 5' 3" (1.6 m)   Wt 78.8 kg (173 lb 12.8 oz)   SpO2 98%   BMI 30.79 kg/m²       Physical Exam    General: Alert and oriented, No acute distress.  Head: Normocephalic, Atraumatic.  Eye: Pupils are equal, round and reactive to light, Extraocular movements are intact, Sclera non-icteric.  Ears/Nose/Throat: Normal, Mucosa moist,Clear.  Neck/Thyroid: Supple, Non-tender  Respiratory: Clear to auscultation bilaterally  Cardiovascular: Regular rate and rhythm  Gastrointestinal: Soft, Non-tender, Non-distended, Normal bowel sounds  Musculoskeletal: Normal range of motion.  Integumentary: Warm, Dry, Intact,  Neurologic: No focal deficits, Cranial Nerves II-XII are grossly intact  Psychiatric: Normal interaction    Assessment:       ICD-10-CM ICD-9-CM   1. Elevated coronary artery calcium score  R93.1 414.00   2. Primary hypertension  I10 401.9        Plan:     1. Elevated coronary artery calcium score  Pathophysiology/Treatment/ Dangers Discussed.  Patient seems to cardiologist for further workup.  - Ambulatory referral/consult to Cardiology; Future    2. Primary hypertension  Patient has been taking medication. Blood pressure goal of less than 130/80-blood pressure is stable. Continue to encourage diet and activity modifications. Including stress " management. Pathophysiology/treatment/dangers discussed.           Follow up if symptoms worsen or fail to improve. In addition to their scheduled follow up, the patient has also been instructed to follow up on as needed basis.     Future Appointments   Date Time Provider Department Center   5/8/2023  3:00 PM Angela Baez MD [facility] [place]   5/9/2023  2:20 PM Keli Denton MD [facility] [place] [facility]   5/10/2023  9:15 AM Shay Daniels Jr., MD [facility] [place]        Angela Baez MD

## 2023-04-13 ENCOUNTER — TELEPHONE (OUTPATIENT)
Dept: PRIMARY CARE CLINIC | Facility: CLINIC | Age: 63
End: 2023-04-13
Payer: COMMERCIAL

## 2023-04-13 DIAGNOSIS — E78.2 MIXED HYPERLIPIDEMIA: Primary | Chronic | ICD-10-CM

## 2023-04-13 RX ORDER — ATORVASTATIN CALCIUM 40 MG/1
40 TABLET, FILM COATED ORAL DAILY
Qty: 90 TABLET | Refills: 1 | Status: SHIPPED | OUTPATIENT
Start: 2023-04-13 | End: 2023-08-05 | Stop reason: SDUPTHER

## 2023-05-12 ENCOUNTER — OFFICE VISIT (OUTPATIENT)
Dept: ORTHOPEDICS | Facility: CLINIC | Age: 63
End: 2023-05-12
Payer: COMMERCIAL

## 2023-05-12 VITALS
BODY MASS INDEX: 30.65 KG/M2 | WEIGHT: 173 LBS | SYSTOLIC BLOOD PRESSURE: 144 MMHG | TEMPERATURE: 99 F | OXYGEN SATURATION: 96 % | HEART RATE: 83 BPM | DIASTOLIC BLOOD PRESSURE: 88 MMHG | HEIGHT: 63 IN

## 2023-05-12 DIAGNOSIS — Z98.890 STATUS POST MEDIAL MENISCUS REPAIR: Primary | ICD-10-CM

## 2023-05-12 PROCEDURE — 3008F BODY MASS INDEX DOCD: CPT | Mod: CPTII,,, | Performed by: ORTHOPAEDIC SURGERY

## 2023-05-12 PROCEDURE — 3008F PR BODY MASS INDEX (BMI) DOCUMENTED: ICD-10-PCS | Mod: CPTII,,, | Performed by: ORTHOPAEDIC SURGERY

## 2023-05-12 PROCEDURE — 3079F DIAST BP 80-89 MM HG: CPT | Mod: CPTII,,, | Performed by: ORTHOPAEDIC SURGERY

## 2023-05-12 PROCEDURE — 1159F PR MEDICATION LIST DOCUMENTED IN MEDICAL RECORD: ICD-10-PCS | Mod: CPTII,,, | Performed by: ORTHOPAEDIC SURGERY

## 2023-05-12 PROCEDURE — 3077F PR MOST RECENT SYSTOLIC BLOOD PRESSURE >= 140 MM HG: ICD-10-PCS | Mod: CPTII,,, | Performed by: ORTHOPAEDIC SURGERY

## 2023-05-12 PROCEDURE — 3077F SYST BP >= 140 MM HG: CPT | Mod: CPTII,,, | Performed by: ORTHOPAEDIC SURGERY

## 2023-05-12 PROCEDURE — 99024 PR POST-OP FOLLOW-UP VISIT: ICD-10-PCS | Mod: ,,, | Performed by: ORTHOPAEDIC SURGERY

## 2023-05-12 PROCEDURE — 4010F PR ACE/ARB THEARPY RXD/TAKEN: ICD-10-PCS | Mod: CPTII,,, | Performed by: ORTHOPAEDIC SURGERY

## 2023-05-12 PROCEDURE — 99024 POSTOP FOLLOW-UP VISIT: CPT | Mod: ,,, | Performed by: ORTHOPAEDIC SURGERY

## 2023-05-12 PROCEDURE — 3079F PR MOST RECENT DIASTOLIC BLOOD PRESSURE 80-89 MM HG: ICD-10-PCS | Mod: CPTII,,, | Performed by: ORTHOPAEDIC SURGERY

## 2023-05-12 PROCEDURE — 1159F MED LIST DOCD IN RCRD: CPT | Mod: CPTII,,, | Performed by: ORTHOPAEDIC SURGERY

## 2023-05-12 PROCEDURE — 4010F ACE/ARB THERAPY RXD/TAKEN: CPT | Mod: CPTII,,, | Performed by: ORTHOPAEDIC SURGERY

## 2023-05-12 NOTE — PROGRESS NOTES
Chief Complaint:   Chief Complaint   Patient presents with    Right Knee - Follow-up     11 wk sp ATS right knee w meniscus repair sx 2/23/23 - GL 5/24/23. Pain is a 0/10 today 6/10 at its worse. has not been taking anything for pain. Patient is currently doing at home Exercise.       History of present illness:  2/23/23: Right knee arthroscopic medial meniscus root repair, partial lateral menisectomy    She returns today. Her pain is under good control. Finished with therapy.      Musculoskeletal:   ROM knee full without pain. Some mild tenderness medially.        Assessment: Status post medial meniscus repair          Plan:  Doing well s/p above. Activities as tolerated. She can follow up if she has any issues.     Shay Daniels MD personally performed the services described in this documentation, including but not limited to patient's history, physical examination, and assessment and plan of care. All medical record entries made by ZANA Houser were performed at his direction and in his presence. The medical record was reviewed and is accurate and complete.

## 2023-06-05 ENCOUNTER — PATIENT MESSAGE (OUTPATIENT)
Dept: FAMILY MEDICINE | Facility: CLINIC | Age: 63
End: 2023-06-05
Payer: COMMERCIAL

## 2023-06-14 DIAGNOSIS — K21.9 GASTROESOPHAGEAL REFLUX DISEASE, UNSPECIFIED WHETHER ESOPHAGITIS PRESENT: ICD-10-CM

## 2023-06-14 RX ORDER — OMEPRAZOLE 20 MG/1
20 CAPSULE, DELAYED RELEASE ORAL DAILY
Qty: 30 CAPSULE | Refills: 3 | Status: SHIPPED | OUTPATIENT
Start: 2023-06-14 | End: 2023-08-30 | Stop reason: SDUPTHER

## 2023-07-03 ENCOUNTER — LAB VISIT (OUTPATIENT)
Dept: LAB | Facility: HOSPITAL | Age: 63
End: 2023-07-03
Attending: FAMILY MEDICINE
Payer: COMMERCIAL

## 2023-07-03 DIAGNOSIS — M79.10 MYALGIA: ICD-10-CM

## 2023-07-03 PROCEDURE — 36415 COLL VENOUS BLD VENIPUNCTURE: CPT

## 2023-07-03 PROCEDURE — 86160 COMPLEMENT ANTIGEN: CPT

## 2023-07-03 PROCEDURE — 86003 ALLG SPEC IGE CRUDE XTRC EA: CPT

## 2023-07-03 PROCEDURE — 86431 RHEUMATOID FACTOR QUANT: CPT

## 2023-07-05 ENCOUNTER — PATIENT MESSAGE (OUTPATIENT)
Dept: ADMINISTRATIVE | Facility: OTHER | Age: 63
End: 2023-07-05
Payer: COMMERCIAL

## 2023-07-05 ENCOUNTER — OFFICE VISIT (OUTPATIENT)
Dept: FAMILY MEDICINE | Facility: CLINIC | Age: 63
End: 2023-07-05
Payer: COMMERCIAL

## 2023-07-05 VITALS
BODY MASS INDEX: 32.04 KG/M2 | OXYGEN SATURATION: 97 % | HEART RATE: 82 BPM | WEIGHT: 180.81 LBS | DIASTOLIC BLOOD PRESSURE: 76 MMHG | HEIGHT: 63 IN | TEMPERATURE: 100 F | SYSTOLIC BLOOD PRESSURE: 121 MMHG

## 2023-07-05 DIAGNOSIS — I10 ESSENTIAL HYPERTENSION: Primary | ICD-10-CM

## 2023-07-05 DIAGNOSIS — M54.50 CHRONIC BILATERAL LOW BACK PAIN WITHOUT SCIATICA: ICD-10-CM

## 2023-07-05 DIAGNOSIS — G89.29 CHRONIC BILATERAL LOW BACK PAIN WITHOUT SCIATICA: ICD-10-CM

## 2023-07-05 DIAGNOSIS — Z00.00 WELLNESS EXAMINATION: ICD-10-CM

## 2023-07-05 PROCEDURE — 4010F ACE/ARB THERAPY RXD/TAKEN: CPT | Mod: CPTII,,, | Performed by: FAMILY MEDICINE

## 2023-07-05 PROCEDURE — 3078F DIAST BP <80 MM HG: CPT | Mod: CPTII,,, | Performed by: FAMILY MEDICINE

## 2023-07-05 PROCEDURE — 4010F PR ACE/ARB THEARPY RXD/TAKEN: ICD-10-PCS | Mod: CPTII,,, | Performed by: FAMILY MEDICINE

## 2023-07-05 PROCEDURE — 3074F PR MOST RECENT SYSTOLIC BLOOD PRESSURE < 130 MM HG: ICD-10-PCS | Mod: CPTII,,, | Performed by: FAMILY MEDICINE

## 2023-07-05 PROCEDURE — 99214 PR OFFICE/OUTPT VISIT, EST, LEVL IV, 30-39 MIN: ICD-10-PCS | Mod: ,,, | Performed by: FAMILY MEDICINE

## 2023-07-05 PROCEDURE — 3078F PR MOST RECENT DIASTOLIC BLOOD PRESSURE < 80 MM HG: ICD-10-PCS | Mod: CPTII,,, | Performed by: FAMILY MEDICINE

## 2023-07-05 PROCEDURE — 3008F PR BODY MASS INDEX (BMI) DOCUMENTED: ICD-10-PCS | Mod: CPTII,,, | Performed by: FAMILY MEDICINE

## 2023-07-05 PROCEDURE — 1160F RVW MEDS BY RX/DR IN RCRD: CPT | Mod: CPTII,,, | Performed by: FAMILY MEDICINE

## 2023-07-05 PROCEDURE — 3008F BODY MASS INDEX DOCD: CPT | Mod: CPTII,,, | Performed by: FAMILY MEDICINE

## 2023-07-05 PROCEDURE — 99214 OFFICE O/P EST MOD 30 MIN: CPT | Mod: ,,, | Performed by: FAMILY MEDICINE

## 2023-07-05 PROCEDURE — 1160F PR REVIEW ALL MEDS BY PRESCRIBER/CLIN PHARMACIST DOCUMENTED: ICD-10-PCS | Mod: CPTII,,, | Performed by: FAMILY MEDICINE

## 2023-07-05 PROCEDURE — 3074F SYST BP LT 130 MM HG: CPT | Mod: CPTII,,, | Performed by: FAMILY MEDICINE

## 2023-07-05 PROCEDURE — 1159F PR MEDICATION LIST DOCUMENTED IN MEDICAL RECORD: ICD-10-PCS | Mod: CPTII,,, | Performed by: FAMILY MEDICINE

## 2023-07-05 PROCEDURE — 1159F MED LIST DOCD IN RCRD: CPT | Mod: CPTII,,, | Performed by: FAMILY MEDICINE

## 2023-07-05 RX ORDER — AMLODIPINE BESYLATE 5 MG/1
5 TABLET ORAL DAILY
Qty: 30 TABLET | Refills: 1 | Status: SHIPPED | OUTPATIENT
Start: 2023-07-05 | End: 2023-07-29 | Stop reason: SDUPTHER

## 2023-07-05 NOTE — PROGRESS NOTES
Patient ID: [id number]     Chief Complaint: Hypertension      HPI:     Nehal Araujo is a 62 y.o. female here today for acute visit  1) Hypertension: Patient has noticed multiple symptoms since starting blood pressure medication-upon recently researching side effects of medication feels that symptoms may be related to the blood pressure medication.  Having significant problems with fatigue-to the point where she is having to take a nap during the day-also experiencing nausea-has to use Zofran twice recently-abdominal discomfort-neck pain.  Blood pressure has been elevated usually above- 140/90 blood pressure is normal today because she took clonidine before visit.  2) Is continuing to experience low back pain-would be interested in program available for low back pain through [facility].        Past Medical History:   Diagnosis Date    History of gout 2022    Lumbar degenerative disc disease 2023    Pre-diabetes 2022    Primary osteoarthritis of right knee 2023        Past Surgical History:   Procedure Laterality Date    ARTHROSCOPY,KNEE,WITH MENISCUS REPAIR Right 2023    Procedure: ARTHROSCOPY,KNEE,WITH MENISCUS REPAIR;  Surgeon: Shay Daniels Jr., MD;  Location: [facility];  Service: Orthopedics;  Laterality: Right;    BUNIONECTOMY Bilateral      SECTION      x 3    CHOLECYSTECTOMY      COLECTOMY      ENDOMETRIAL ABLATION      thumb surgery      TONSILLECTOMY          Social History     Tobacco Use    Smoking status: Former     Packs/day: 0.75     Years: 15.00     Pack years: 11.25     Types: Cigarettes     Quit date:      Years since quittin.5    Smokeless tobacco: Never   Substance Use Topics    Alcohol use: Yes     Alcohol/week: 5.0 standard drinks     Types: 5 Drinks containing 0.5 oz of alcohol per week     Comment: sociallly    Drug use: Never        Social History     Socioeconomic History    Marital status:      Spouse name: Hardik    Number of  "children: 3        Current Outpatient Medications   Medication Instructions    amLODIPine (NORVASC) 5 mg, Oral, Daily    atorvastatin (LIPITOR) 40 mg, Oral, Daily    icosapent ethyL (VASCEPA) 2 g, Oral, 2 times daily    meloxicam (MOBIC) 15 mg, Oral    olmesartan (BENICAR) 40 mg, Oral, Daily    omeprazole (PRILOSEC) 20 mg, Oral, Daily       Review of patient's allergies indicates:   Allergen Reactions    Percocet [oxycodone-acetaminophen] Itching        Patient Care Team:  Angela Baez MD as PCP - General (Family Medicine)       Subjective:     Review of Systems    12 point review of systems conducted, negative except as stated in the history of present illness. See HPI for details.      Objective:     Visit Vitals  /76   Pulse 82   Temp 99.5 °F (37.5 °C)   Ht 5' 3" (1.6 m)   Wt 82 kg (180 lb 12.8 oz)   SpO2 97%   BMI 32.03 kg/m²       Physical Exam    General: Alert and oriented, No acute distress.  Head: Normocephalic, Atraumatic.  Eye: Pupils are equal, round and reactive to light, Extraocular movements are intact, Sclera non-icteric.  Ears/Nose/Throat: Normal, Mucosa moist,Clear.  Neck/Thyroid: Supple, Full range of motion.  Respiratory: Clear to auscultation bilaterally  Cardiovascular: Regular rate and rhythm  Gastrointestinal: Soft, Non-tender, Non-distended, Normal bowel sounds.  Musculoskeletal: Normal range of motion.  Integumentary: Warm, Dry, Intact  Extremities: No clubbing, cyanosis or edema  Neurologic: No focal deficits  Psychiatric: Normal interactiont       Assessment:       ICD-10-CM ICD-9-CM   1. Essential hypertension  I10 401.9   2. Chronic bilateral low back pain without sciatica  M54.50 724.2    G89.29 338.29   3. Wellness examination  Z00.00 V70.0        Plan:     1. Essential hypertension  Patient to stop taking olmesartan-Rx Norvasc-patient to monitor blood pressure at home-call office with readings in 1-2 weeks. Blood pressure goal of less than 130/80-blood pressure is stable in " the office today. Continue to encourage diet and activity modifications. Including stress management. Pathophysiology/treatment/dangers discussed.   - amLODIPine (NORVASC) 5 MG tablet; Take 1 tablet (5 mg total) by mouth once daily.  Dispense: 30 tablet; Refill: 1    2. Chronic bilateral low back pain without sciatica  Pathophysiology/Treatment/ Dangers Discussed.  Patient is being referred to back care companion  - [facility]    3. Wellness examination  Per patient's request referral to gynecologist to establish care up.  - Ambulatory referral/consult to Obstetrics / Gynecology; Future         Follow up in about 6 weeks (around 8/16/2023). In addition to their scheduled follow up, the patient has also been instructed to follow up on as needed basis.     Future Appointments   Date Time Provider Department Center   8/16/2023  3:45 PM [facility]   10/11/2023  9:00 AM [facility] [place]        Angela Baez MD

## 2023-07-06 ENCOUNTER — PATIENT OUTREACH (OUTPATIENT)
Dept: OTHER | Facility: OTHER | Age: 63
End: 2023-07-06
Payer: COMMERCIAL

## 2023-07-06 LAB — CYCLIC CITRULLINATED PEPTIDE (CCP) (OHS): 1 U/ML

## 2023-07-06 NOTE — PROGRESS NOTES
Connected Back: Patient Outreach    1.Throughout the program, would it be easier for me to reach out via phone or [email or web address]?  Either  2.Would you like to be called in the Morning or Afternoon? Noon   3. When do you plan on starting the program? ?Week of July 10th  4. Lastly, do you have any questions regarding the program?  None at this time.

## 2023-07-07 DIAGNOSIS — M79.10 MYALGIA: Primary | ICD-10-CM

## 2023-07-07 LAB
C3 SERPL-MCNC: 148 MG/DL
C4 SERPL-MCNC: 23 MG/DL
NUCLEAR IGG SER QL IA: NORMAL
RHEUMATOID FACT SERPL-ACNC: <10 IU/ML

## 2023-07-07 NOTE — PROGRESS NOTES
Patient is experiencing low-grade fever with fatigue on a regular basis-initial blood work for connective tissue disorder negative-patient to be referred to rheumatologist for further recommendations.

## 2023-07-16 ENCOUNTER — OFFICE VISIT (OUTPATIENT)
Dept: URGENT CARE | Facility: CLINIC | Age: 63
End: 2023-07-16
Payer: COMMERCIAL

## 2023-07-16 VITALS
TEMPERATURE: 99 F | SYSTOLIC BLOOD PRESSURE: 119 MMHG | WEIGHT: 180 LBS | DIASTOLIC BLOOD PRESSURE: 77 MMHG | HEIGHT: 63 IN | HEART RATE: 96 BPM | BODY MASS INDEX: 31.89 KG/M2 | RESPIRATION RATE: 18 BRPM | OXYGEN SATURATION: 96 %

## 2023-07-16 DIAGNOSIS — R22.1 LOCALIZED SWELLING, MASS AND LUMP, NECK: Primary | ICD-10-CM

## 2023-07-16 DIAGNOSIS — I89.0 LYMPHEDEMA: ICD-10-CM

## 2023-07-16 PROCEDURE — 99213 PR OFFICE/OUTPT VISIT, EST, LEVL III, 20-29 MIN: ICD-10-PCS | Mod: ,,, | Performed by: FAMILY MEDICINE

## 2023-07-16 PROCEDURE — 99213 OFFICE O/P EST LOW 20 MIN: CPT | Mod: ,,, | Performed by: FAMILY MEDICINE

## 2023-07-16 RX ORDER — AMOXICILLIN AND CLAVULANATE POTASSIUM 875; 125 MG/1; MG/1
1 TABLET, FILM COATED ORAL EVERY 12 HOURS
Qty: 20 TABLET | Refills: 0 | Status: SHIPPED | OUTPATIENT
Start: 2023-07-16 | End: 2023-07-26

## 2023-07-16 RX ORDER — PREDNISONE 20 MG/1
20 TABLET ORAL 2 TIMES DAILY
Qty: 10 TABLET | Refills: 0 | Status: SHIPPED | OUTPATIENT
Start: 2023-07-16 | End: 2023-07-21

## 2023-07-16 NOTE — PROGRESS NOTES
Patient ID: Nehal Araujo is a 62 y.o. female.  Chief Complaint: Left side swollen gland (Patient is a 62 y.o. female who presents to urgent care with complaints of left side swollen gland x2 days. Also said her neck has been hurting for about a month. Pt took Advil and Tylenol yesterday with no relief. Patient denies sore throat or any trouble swallowing. )    HPI:   Patient presents here today for above reason.     Left side swollen gland (Patient is a 62 y.o. female who presents to urgent care with complaints of left side swollen gland x2 days. Also said her neck has been hurting for about a month. Pt took Advil and Tylenol yesterday with no relief. Patient denies sore throat or any trouble swallowing. )      Past Medical History:  Past Medical History:   Diagnosis Date    History of gout 2022    Lumbar degenerative disc disease 2023    Pre-diabetes 2022    Primary osteoarthritis of right knee 2023     Past Surgical History:   Procedure Laterality Date    ARTHROSCOPY,KNEE,WITH MENISCUS REPAIR Right 2023    Procedure: ARTHROSCOPY,KNEE,WITH MENISCUS REPAIR;  Surgeon: Shay Daniels Jr., MD;  Location: [facility];  Service: Orthopedics;  Laterality: Right;    BUNIONECTOMY Bilateral      SECTION      x 3    CHOLECYSTECTOMY      COLECTOMY      ENDOMETRIAL ABLATION      thumb surgery      TONSILLECTOMY       Review of patient's allergies indicates:   Allergen Reactions    Allopurinol analogues Rash    Percocet [oxycodone-acetaminophen] Itching     Current Outpatient Medications on File Prior to Visit   Medication Sig Dispense Refill    amLODIPine (NORVASC) 5 MG tablet Take 1 tablet (5 mg total) by mouth once daily. 30 tablet 1    atorvastatin (LIPITOR) 40 MG tablet Take 1 tablet (40 mg total) by mouth once daily. 90 tablet 1    icosapent ethyL (VASCEPA) 1 gram Cap Take 2 capsules (2 g total) by mouth 2 (two) times daily. 360 capsule 3    meloxicam (MOBIC) 15 MG tablet Take 15 mg by mouth.       "omeprazole (PRILOSEC) 20 MG capsule Take 1 capsule (20 mg total) by mouth once daily. 30 capsule 3    olmesartan (BENICAR) 40 MG tablet Take 1 tablet (40 mg total) by mouth once daily. (Patient not taking: Reported on 2023) 30 tablet 3     No current facility-administered medications on file prior to visit.     Social History     Socioeconomic History    Marital status:      Spouse name: Hardik    Number of children: 3   Occupational History    Occupation: Retired: Health Care   Tobacco Use    Smoking status: Former     Packs/day: 0.75     Years: 15.00     Pack years: 11.25     Types: Cigarettes     Quit date:      Years since quittin.5    Smokeless tobacco: Never   Substance and Sexual Activity    Alcohol use: Yes     Alcohol/week: 5.0 standard drinks     Types: 5 Drinks containing 0.5 oz of alcohol per week     Comment: sociallly    Drug use: Never    Sexual activity: Yes     Family History   Problem Relation Age of Onset    Diabetes Mother     Breast cancer Mother     Heart disease Mother     Stroke Mother     No Known Problems Father      ROS:   Review of Systems   Constitutional:  Positive for fatigue. Negative for activity change, appetite change, chills, diaphoresis, fever and unexpected weight change.   HENT:  Positive for facial swelling.    Respiratory: Negative.     Cardiovascular: Negative.    Gastrointestinal: Negative.    Psychiatric/Behavioral:  Positive for sleep disturbance.    All other systems reviewed and are negative.      Vitals/PE:   /77 (BP Location: Right arm)   Pulse 96   Temp 98.8 °F (37.1 °C) (Oral)   Resp 18   Ht 5' 3" (1.6 m)   Wt 81.6 kg (180 lb)   SpO2 96%   BMI 31.89 kg/m²   Physical Exam  Vitals and nursing note reviewed.   Constitutional:       General: She is not in acute distress.     Appearance: Normal appearance. She is not ill-appearing, toxic-appearing or diaphoretic.   HENT:      Head: Atraumatic.        Right Ear: Tympanic membrane normal. "      Left Ear: Tympanic membrane normal.      Mouth/Throat:      Mouth: Mucous membranes are dry.      Pharynx: Oropharynx is clear.   Eyes:      Extraocular Movements: Extraocular movements intact.      Conjunctiva/sclera: Conjunctivae normal.      Pupils: Pupils are equal, round, and reactive to light.   Neck:      Vascular: No carotid bruit.   Cardiovascular:      Rate and Rhythm: Normal rate and regular rhythm.      Pulses: Normal pulses.      Heart sounds: Normal heart sounds. No murmur heard.    No friction rub. No gallop.   Pulmonary:      Effort: Pulmonary effort is normal. No respiratory distress.      Breath sounds: No stridor. No wheezing or rhonchi.   Abdominal:      General: There is no distension.      Palpations: There is no mass.      Tenderness: There is no abdominal tenderness. There is no left CVA tenderness, guarding or rebound.      Hernia: No hernia is present.   Musculoskeletal:         General: No swelling or signs of injury. Normal range of motion.      Cervical back: Normal range of motion and neck supple. No rigidity or tenderness.      Right lower leg: No edema.      Left lower leg: No edema.   Lymphadenopathy:      Cervical: No cervical adenopathy.   Skin:     Capillary Refill: Capillary refill takes less than 2 seconds.      Coloration: Skin is not jaundiced.      Findings: No bruising, lesion or rash.   Neurological:      General: No focal deficit present.      Mental Status: She is alert and oriented to person, place, and time. Mental status is at baseline.      Cranial Nerves: No cranial nerve deficit.      Sensory: No sensory deficit.      Motor: No weakness.      Coordination: Coordination normal.      Gait: Gait normal.   Psychiatric:         Mood and Affect: Mood normal.         Behavior: Behavior normal.         Thought Content: Thought content normal.         Judgment: Judgment normal.       Assessment/Plan:   Localized swelling, mass and lump, neck  -     predniSONE  (DELTASONE) 20 MG tablet; Take 1 tablet (20 mg total) by mouth 2 (two) times daily. for 5 days  Dispense: 10 tablet; Refill: 0  -     amoxicillin-clavulanate 875-125mg (AUGMENTIN) 875-125 mg per tablet; Take 1 tablet by mouth every 12 (twelve) hours. for 10 days  Dispense: 20 tablet; Refill: 0  Start medication as above.     Keep scheduled apt with PCP on Tuesday of this week. May need additional blood/ lab work/ imaging with additional follow up etc.   Verbalizes understanding.   Lymphedema  As above.          Education and counseling done face to face regarding medical conditions and plan. Contact office if new symptoms develop. Should any symptoms ever significantly worsen seek emergency medical attention/go to ER. Follow up at least yearly for wellness or sooner PRN. Nurse to call patient with any results. The patient is receptive, expresses understanding and is agreeable to plan. All questions have been answered.  Follow up if symptoms worsen or fail to improve.

## 2023-07-17 ENCOUNTER — PATIENT OUTREACH (OUTPATIENT)
Dept: ADMINISTRATIVE | Facility: HOSPITAL | Age: 63
End: 2023-07-17
Payer: COMMERCIAL

## 2023-07-17 ENCOUNTER — OFFICE VISIT (OUTPATIENT)
Dept: FAMILY MEDICINE | Facility: CLINIC | Age: 63
End: 2023-07-17
Payer: COMMERCIAL

## 2023-07-17 VITALS
BODY MASS INDEX: 31.83 KG/M2 | WEIGHT: 179.63 LBS | HEART RATE: 98 BPM | HEIGHT: 63 IN | SYSTOLIC BLOOD PRESSURE: 138 MMHG | OXYGEN SATURATION: 97 % | DIASTOLIC BLOOD PRESSURE: 82 MMHG | TEMPERATURE: 99 F

## 2023-07-17 DIAGNOSIS — R22.1 NECK SWELLING: Primary | ICD-10-CM

## 2023-07-17 DIAGNOSIS — E78.2 MIXED HYPERLIPIDEMIA: Chronic | ICD-10-CM

## 2023-07-17 DIAGNOSIS — R73.03 PRE-DIABETES: Chronic | ICD-10-CM

## 2023-07-17 DIAGNOSIS — I10 ESSENTIAL HYPERTENSION: ICD-10-CM

## 2023-07-17 PROCEDURE — 3044F PR MOST RECENT HEMOGLOBIN A1C LEVEL <7.0%: ICD-10-PCS | Mod: CPTII,,, | Performed by: FAMILY MEDICINE

## 2023-07-17 PROCEDURE — 4010F ACE/ARB THERAPY RXD/TAKEN: CPT | Mod: CPTII,,, | Performed by: FAMILY MEDICINE

## 2023-07-17 PROCEDURE — 1159F PR MEDICATION LIST DOCUMENTED IN MEDICAL RECORD: ICD-10-PCS | Mod: CPTII,,, | Performed by: FAMILY MEDICINE

## 2023-07-17 PROCEDURE — 4010F PR ACE/ARB THEARPY RXD/TAKEN: ICD-10-PCS | Mod: CPTII,,, | Performed by: FAMILY MEDICINE

## 2023-07-17 PROCEDURE — 1159F MED LIST DOCD IN RCRD: CPT | Mod: CPTII,,, | Performed by: FAMILY MEDICINE

## 2023-07-17 PROCEDURE — 3079F DIAST BP 80-89 MM HG: CPT | Mod: CPTII,,, | Performed by: FAMILY MEDICINE

## 2023-07-17 PROCEDURE — 3075F SYST BP GE 130 - 139MM HG: CPT | Mod: CPTII,,, | Performed by: FAMILY MEDICINE

## 2023-07-17 PROCEDURE — 1160F RVW MEDS BY RX/DR IN RCRD: CPT | Mod: CPTII,,, | Performed by: FAMILY MEDICINE

## 2023-07-17 PROCEDURE — 3008F PR BODY MASS INDEX (BMI) DOCUMENTED: ICD-10-PCS | Mod: CPTII,,, | Performed by: FAMILY MEDICINE

## 2023-07-17 PROCEDURE — 99214 PR OFFICE/OUTPT VISIT, EST, LEVL IV, 30-39 MIN: ICD-10-PCS | Mod: ,,, | Performed by: FAMILY MEDICINE

## 2023-07-17 PROCEDURE — 99214 OFFICE O/P EST MOD 30 MIN: CPT | Mod: ,,, | Performed by: FAMILY MEDICINE

## 2023-07-17 PROCEDURE — 3008F BODY MASS INDEX DOCD: CPT | Mod: CPTII,,, | Performed by: FAMILY MEDICINE

## 2023-07-17 PROCEDURE — 3079F PR MOST RECENT DIASTOLIC BLOOD PRESSURE 80-89 MM HG: ICD-10-PCS | Mod: CPTII,,, | Performed by: FAMILY MEDICINE

## 2023-07-17 PROCEDURE — 3075F PR MOST RECENT SYSTOLIC BLOOD PRESS GE 130-139MM HG: ICD-10-PCS | Mod: CPTII,,, | Performed by: FAMILY MEDICINE

## 2023-07-17 PROCEDURE — 1160F PR REVIEW ALL MEDS BY PRESCRIBER/CLIN PHARMACIST DOCUMENTED: ICD-10-PCS | Mod: CPTII,,, | Performed by: FAMILY MEDICINE

## 2023-07-17 PROCEDURE — 3044F HG A1C LEVEL LT 7.0%: CPT | Mod: CPTII,,, | Performed by: FAMILY MEDICINE

## 2023-07-17 NOTE — LETTER
"  This communication is flagged as high priority.        AUTHORIZATION FOR RELEASE OF   CONFIDENTIAL INFORMATION    Dear Staff   [phone],    We are seeing Nehal Araujo, date of birth 1960, in the clinic at [facility]. Angela Baez MD is the patient's PCP. Nehal Araujo has an outstanding lab/procedure at the time we reviewed her chart. In order to help keep her health information updated, she has authorized us to request the following medical record(s):        (  )  MAMMOGRAM                                      (  )  COLONOSCOPY      (xx  )  PAP SMEAR                                          (  )  OUTSIDE LAB RESULTS     (  )  DEXA SCAN                                          (  )  EYE EXAM            (  )  FOOT EXAM                                          (  )  ENTIRE RECORD     (  )  OUTSIDE IMMUNIZATIONS                 (  )  _______________         Please fax records to [facility], Indira Gautam, MD,  [phone]      If you have any questions, please contact Tucker Wayne (Connie)Care Coordinator @ [phone]     Patient Name: Nehal Araujo  : 1960  Patient Phone #: [phone]     "

## 2023-07-17 NOTE — PROGRESS NOTES
Patient ID: [id number]     Chief Complaint: Follow Up    HPI:     Nehal Araujo is a 62 y.o. female here today for a follow up.   On Friday patient started to notice swelling on the left side of her face-within 48 hours the swelling became much worse-patient was having fevers/fatigue-evaluated at an urgent care -started on prednisone and Augmentin-significant improvement since yesterday-patient is also noticing that her neck pain is much better.  No fevers since discharge-does seem to be feeling better.   1) Hypertension: Patient has been taking medicine daily. BP is better-until 2 days ago significant improvement in energy-no associated abdominal discomfort.  Patient is noticing small areas of redness on her right leg-almost like freckles-not associated with any discomfort. No symptoms associated with increased BP such as headache/ visual changes/ dizziness/ chest pain.      Past Medical History:   Diagnosis Date    History of gout 2022    Lumbar degenerative disc disease 2023    Pre-diabetes 2022    Primary osteoarthritis of right knee 2023        Past Surgical History:   Procedure Laterality Date    ARTHROSCOPY,KNEE,WITH MENISCUS REPAIR Right 2023    Procedure: ARTHROSCOPY,KNEE,WITH MENISCUS REPAIR;  Surgeon: Shay Daniels Jr., MD;  Location: [facility];  Service: Orthopedics;  Laterality: Right;    BUNIONECTOMY Bilateral      SECTION      x 3    CHOLECYSTECTOMY      COLECTOMY      ENDOMETRIAL ABLATION      thumb surgery      TONSILLECTOMY          Social History     Tobacco Use    Smoking status: Former     Packs/day: 0.75     Years: 15.00     Pack years: 11.25     Types: Cigarettes     Quit date:      Years since quittin.5    Smokeless tobacco: Never   Substance Use Topics    Alcohol use: Yes     Alcohol/week: 5.0 standard drinks     Types: 5 Drinks containing 0.5 oz of alcohol per week     Comment: sociallly    Drug use: Never        Social History     Socioeconomic History     "Marital status:      Spouse name: Hardik    Number of children: 3        Current Outpatient Medications   Medication Instructions    amLODIPine (NORVASC) 5 mg, Oral, Daily    amoxicillin-clavulanate 875-125mg (AUGMENTIN) 875-125 mg per tablet 1 tablet, Oral, Every 12 hours    atorvastatin (LIPITOR) 40 mg, Oral, Daily    icosapent ethyL (VASCEPA) 2 g, Oral, 2 times daily    meloxicam (MOBIC) 15 mg, Oral    omeprazole (PRILOSEC) 20 mg, Oral, Daily    predniSONE (DELTASONE) 20 mg, Oral, 2 times daily       Review of patient's allergies indicates:   Allergen Reactions    Allopurinol analogues Rash    Percocet [oxycodone-acetaminophen] Itching        Patient Care Team:  Angela Baez MD as PCP - General (Family Medicine)  Sandy Gloria as        Subjective:     Review of Systems    12 point review of systems conducted, negative except as stated in the history of present illness. See HPI for details.      Objective:     Visit Vitals  /82 (BP Location: Left arm, Patient Position: Sitting)   Pulse 98   Temp 98.7 °F (37.1 °C)   Ht 5' 3" (1.6 m)   Wt 81.5 kg (179 lb 9.6 oz)   SpO2 97%   BMI 31.81 kg/m²       Physical Exam    General: Alert and oriented, No acute distress.  Head: Normocephalic, Atraumatic.  Eye: Pupils are equal, round and reactive to light, Extraocular movements are intact, Sclera non-icteric.  Ears/Nose/Throat: Normal, Mucosa moist,Clear.  Neck/Thyroid: Mild tenderness,Full range of motion.  Respiratory: Clear to auscultation bilaterally  Cardiovascular: Regular rate and rhythm  Gastrointestinal: Soft, Non-tender, Non-distended, Normal bowel sounds  Musculoskeletal: Normal range of motion.  Integumentary: Warm, Dry, Right leg-papules noted  Extremities: No clubbing, cyanosis or edema  Neurologic: No focal deficits, Cranial Nerves II-XII are grossly intact  Psychiatric: Normal interaction, Coherent speech, Euthymic mood, Appropriate affect       Assessment:       ICD-10-CM ICD-9-CM "   1. Neck swelling  R22.1 784.2   2. Essential hypertension  I10 401.9   3. Mixed hyperlipidemia  E78.2 272.2   4. Pre-diabetes  R73.03 790.29        Plan:     1. Neck swelling  Patient  to finish antibiotics. Check studies management based upon results.  Pathophysiology/treatment/dangers discussed.   - US Soft Tissue Head Neck Thyroid; Future    2. Essential hypertension  Patient has been taking medication. Blood pressure goal of less than 130/80-blood pressure is stable. Continue to encourage diet and activity modifications. Including stress management. Pathophysiology/treatment/dangers discussed.   - CBC Auto Differential; Future  - Comprehensive Metabolic Panel; Future    3. Mixed hyperlipidemia  Check studies management based upon results.  Pathophysiology/treatment/dangers discussed.   - Lipid Panel; Future    4. Pre-diabetes  Check studies management based upon results.  Pathophysiology/treatment/dangers discussed.   - Hemoglobin A1C; Future       Follow up if symptoms worsen or fail to improve. In addition to their scheduled follow up, the patient has also been instructed to follow up on as needed basis.     Future Appointments   Date Time Provider Department Center   8/16/2023  3:45 PM Angela Baez MD [facility] [place]   10/11/2023  9:00 AM Buck Mata MD [facility] Baez MD

## 2023-07-18 ENCOUNTER — PATIENT OUTREACH (OUTPATIENT)
Dept: OTHER | Facility: OTHER | Age: 63
End: 2023-07-18
Payer: COMMERCIAL

## 2023-07-18 NOTE — PROGRESS NOTES
Connected Back: Patient Outreach    Worsening symptoms outreach - Patient explained she was having neck pains due to a swollen gland, but received a steroid shot from her PCP and pain had subsided. Patient explained she is okay to move forward with this week exercises,  asked her to follow up if pain were to increase in the next week while performing exercises. Note: Patient had knee surgery this past year and can not put full weight on R knee. Will speak with clinical team to keep an eye on any exercises in the future that allow for weight bearing on R knee only.

## 2023-07-20 LAB — MAYO GENERIC ORDERABLE RESULT: NORMAL

## 2023-07-24 ENCOUNTER — PATIENT OUTREACH (OUTPATIENT)
Dept: ADMINISTRATIVE | Facility: HOSPITAL | Age: 63
End: 2023-07-24
Payer: COMMERCIAL

## 2023-07-24 NOTE — PROGRESS NOTES
New information given [facility] [phone]  not a working number.  Poor copies arrived, re requested.    Population Health Outreach.  Follow up call for Pap smear, call to patient for further information, PCP is   Hardik Kiser, I will request with his office  [phone] Fax # [phone]

## 2023-07-24 NOTE — LETTER
"  This communication is flagged as high priority.        AUTHORIZATION FOR RELEASE OF   CONFIDENTIAL INFORMATION    Dear Staff of KAROLINE Kiser MD,    We are seeing Nehal Araujo, date of birth 1960, in the clinic at [facility]. Angela Baez MD is the patient's PCP. Nehal Araujo has an outstanding lab/procedure at the time we reviewed her chart. In order to help keep her health information updated, she has authorized us to request the following medical record(s):        (  )  MAMMOGRAM                                      (  )  COLONOSCOPY      (xx  )  PAP SMEAR                                          (  )  OUTSIDE LAB RESULTS     (  )  DEXA SCAN                                          (  )  EYE EXAM            (  )  FOOT EXAM                                          (  )  ENTIRE RECORD     (  )  OUTSIDE IMMUNIZATIONS                 (  )  _______________         Please fax records to [facility], Indira Gautam, MD,  [phone]      If you have any questions, please contact Tucker Wayne (Connie)Care Coordinator     @ [phone] Patient Name: Nehal Araujo  : 1960  Patient Phone #: [phone]     "

## 2023-07-26 ENCOUNTER — PATIENT MESSAGE (OUTPATIENT)
Dept: ADMINISTRATIVE | Facility: OTHER | Age: 63
End: 2023-07-26
Payer: COMMERCIAL

## 2023-07-26 ENCOUNTER — PATIENT OUTREACH (OUTPATIENT)
Dept: OTHER | Facility: OTHER | Age: 63
End: 2023-07-26
Payer: COMMERCIAL

## 2023-07-26 NOTE — PROGRESS NOTES
Connected Back: Patient Outreach    Patient Check In - Made sure that patient could view exercises in the morning time. She has completed the weekly questionnaire and is doing great. Should receive package this week.

## 2023-07-27 ENCOUNTER — PATIENT MESSAGE (OUTPATIENT)
Dept: OTHER | Facility: OTHER | Age: 63
End: 2023-07-27
Payer: COMMERCIAL

## 2023-07-27 ENCOUNTER — PATIENT OUTREACH (OUTPATIENT)
Dept: ADMINISTRATIVE | Facility: HOSPITAL | Age: 63
End: 2023-07-27
Payer: COMMERCIAL

## 2023-07-29 ENCOUNTER — PATIENT MESSAGE (OUTPATIENT)
Dept: FAMILY MEDICINE | Facility: CLINIC | Age: 63
End: 2023-07-29
Payer: COMMERCIAL

## 2023-07-29 DIAGNOSIS — I10 ESSENTIAL HYPERTENSION: ICD-10-CM

## 2023-07-31 ENCOUNTER — HOSPITAL ENCOUNTER (OUTPATIENT)
Dept: RADIOLOGY | Facility: HOSPITAL | Age: 63
Discharge: HOME | End: 2023-07-31
Attending: FAMILY MEDICINE
Payer: COMMERCIAL

## 2023-07-31 DIAGNOSIS — R22.1 NECK SWELLING: ICD-10-CM

## 2023-07-31 PROCEDURE — 76536 US EXAM OF HEAD AND NECK: CPT | Mod: TC

## 2023-07-31 RX ORDER — AMLODIPINE BESYLATE 5 MG/1
5 TABLET ORAL DAILY
Qty: 30 TABLET | Refills: 1 | Status: SHIPPED | OUTPATIENT
Start: 2023-07-31 | End: 2023-08-30 | Stop reason: SDUPTHER

## 2023-08-03 ENCOUNTER — PATIENT MESSAGE (OUTPATIENT)
Dept: ADMINISTRATIVE | Facility: OTHER | Age: 63
End: 2023-08-03
Payer: COMMERCIAL

## 2023-08-05 DIAGNOSIS — E78.2 MIXED HYPERLIPIDEMIA: Chronic | ICD-10-CM

## 2023-08-07 ENCOUNTER — PATIENT OUTREACH (OUTPATIENT)
Dept: OTHER | Facility: OTHER | Age: 63
End: 2023-08-07
Payer: COMMERCIAL

## 2023-08-07 NOTE — PROGRESS NOTES
Connected Back: Patient Outreach    Took off 1/2 kneeling pall off press exercise and exchanged with lower trunk rotation exercise.

## 2023-08-08 DIAGNOSIS — R73.03 PRE-DIABETES: Primary | Chronic | ICD-10-CM

## 2023-08-08 RX ORDER — INSULIN PUMP SYRINGE, 3 ML
EACH MISCELLANEOUS
Qty: 1 EACH | Refills: 0 | Status: SHIPPED | OUTPATIENT
Start: 2023-08-08 | End: 2023-09-12

## 2023-08-08 RX ORDER — LANCETS
EACH MISCELLANEOUS
Qty: 50 EACH | Refills: 0 | Status: SHIPPED | OUTPATIENT
Start: 2023-08-08 | End: 2023-09-12

## 2023-08-09 ENCOUNTER — HOSPITAL ENCOUNTER (INPATIENT)
Facility: HOSPITAL | Age: 63
LOS: 3 days | Discharge: HOME | DRG: 661 | End: 2023-08-12
Attending: INTERNAL MEDICINE | Admitting: INTERNAL MEDICINE
Payer: COMMERCIAL

## 2023-08-09 ENCOUNTER — HOSPITAL ENCOUNTER (EMERGENCY)
Facility: HOSPITAL | Age: 63
Discharge: ANOTHER HOSPITAL | End: 2023-08-09
Attending: PHYSICAL MEDICINE & REHABILITATION
Payer: COMMERCIAL

## 2023-08-09 VITALS
OXYGEN SATURATION: 97 % | BODY MASS INDEX: 31.18 KG/M2 | RESPIRATION RATE: 15 BRPM | DIASTOLIC BLOOD PRESSURE: 84 MMHG | TEMPERATURE: 97 F | WEIGHT: 176 LBS | HEIGHT: 63 IN | SYSTOLIC BLOOD PRESSURE: 174 MMHG | HEART RATE: 86 BPM

## 2023-08-09 DIAGNOSIS — R73.9 HYPERGLYCEMIA: ICD-10-CM

## 2023-08-09 DIAGNOSIS — I10 HYPERTENSION, UNSPECIFIED TYPE: ICD-10-CM

## 2023-08-09 DIAGNOSIS — R10.9 RIGHT FLANK PAIN: Primary | ICD-10-CM

## 2023-08-09 DIAGNOSIS — N23 RENAL COLIC ON RIGHT SIDE: Primary | ICD-10-CM

## 2023-08-09 DIAGNOSIS — N13.30 HYDRONEPHROSIS, UNSPECIFIED HYDRONEPHROSIS TYPE: ICD-10-CM

## 2023-08-09 DIAGNOSIS — R52 INTRACTABLE PAIN: ICD-10-CM

## 2023-08-09 DIAGNOSIS — E78.5 HYPERLIPIDEMIA, UNSPECIFIED HYPERLIPIDEMIA TYPE: ICD-10-CM

## 2023-08-09 DIAGNOSIS — N20.1 URETEROLITHIASIS: ICD-10-CM

## 2023-08-09 DIAGNOSIS — N13.9 OBSTRUCTIVE UROPATHY: ICD-10-CM

## 2023-08-09 LAB
ALBUMIN SERPL-MCNC: 5 G/DL (ref 3.4–4.8)
ALBUMIN/GLOB SERPL: 1.3 RATIO (ref 1.1–2)
ALP SERPL-CCNC: 72 UNIT/L (ref 40–150)
ALT SERPL-CCNC: 86 UNIT/L (ref 0–55)
APPEARANCE UR: ABNORMAL
AST SERPL-CCNC: 56 UNIT/L (ref 5–34)
BACTERIA #/AREA URNS AUTO: ABNORMAL /HPF
BASOPHILS # BLD AUTO: 0.04 X10(3)/MCL
BASOPHILS NFR BLD AUTO: 0.5 %
BILIRUB SERPL-MCNC: 0.6 MG/DL
BILIRUB UR QL STRIP.AUTO: NEGATIVE
BUN SERPL-MCNC: 14 MG/DL (ref 9.8–20.1)
CALCIUM SERPL-MCNC: 9.6 MG/DL (ref 8.4–10.2)
CHLORIDE SERPL-SCNC: 100 MMOL/L (ref 98–107)
CO2 SERPL-SCNC: 26 MMOL/L (ref 23–31)
COLOR UR: YELLOW
CREAT SERPL-MCNC: 0.94 MG/DL (ref 0.55–1.02)
EOSINOPHIL # BLD AUTO: 0.04 X10(3)/MCL (ref 0–0.9)
EOSINOPHIL NFR BLD AUTO: 0.5 %
ERYTHROCYTE [DISTWIDTH] IN BLOOD BY AUTOMATED COUNT: 12.4 % (ref 11.5–17)
GFR SERPLBLD CREATININE-BSD FMLA CKD-EPI: >60 MLS/MIN/1.73/M2
GLOBULIN SER-MCNC: 3.9 GM/DL (ref 2.4–3.5)
GLUCOSE SERPL-MCNC: 214 MG/DL (ref 82–115)
GLUCOSE UR QL STRIP.AUTO: ABNORMAL
HCT VFR BLD AUTO: 39.5 % (ref 37–47)
HGB BLD-MCNC: 13.4 G/DL (ref 12–16)
IMM GRANULOCYTES # BLD AUTO: 0.06 X10(3)/MCL (ref 0–0.04)
IMM GRANULOCYTES NFR BLD AUTO: 0.7 %
KETONES UR QL STRIP.AUTO: ABNORMAL
LEUKOCYTE ESTERASE UR QL STRIP.AUTO: NEGATIVE
LYMPHOCYTES # BLD AUTO: 1.75 X10(3)/MCL (ref 0.6–4.6)
LYMPHOCYTES NFR BLD AUTO: 21.5 %
MCH RBC QN AUTO: 32.2 PG (ref 27–31)
MCHC RBC AUTO-ENTMCNC: 33.9 G/DL (ref 33–36)
MCV RBC AUTO: 95 FL (ref 80–94)
MONOCYTES # BLD AUTO: 0.54 X10(3)/MCL (ref 0.1–1.3)
MONOCYTES NFR BLD AUTO: 6.6 %
NEUTROPHILS # BLD AUTO: 5.7 X10(3)/MCL (ref 2.1–9.2)
NEUTROPHILS NFR BLD AUTO: 70.2 %
NITRITE UR QL STRIP.AUTO: NEGATIVE
NRBC BLD AUTO-RTO: 0 %
PH UR STRIP.AUTO: 6.5 [PH]
PLATELET # BLD AUTO: 257 X10(3)/MCL (ref 130–400)
PMV BLD AUTO: 8.8 FL (ref 7.4–10.4)
POTASSIUM SERPL-SCNC: 3.3 MMOL/L (ref 3.5–5.1)
PROT SERPL-MCNC: 8.9 GM/DL (ref 5.8–7.6)
PROT UR QL STRIP.AUTO: ABNORMAL
RBC # BLD AUTO: 4.16 X10(6)/MCL (ref 4.2–5.4)
RBC #/AREA URNS AUTO: >100 /HPF
RBC UR QL AUTO: ABNORMAL
SODIUM SERPL-SCNC: 140 MMOL/L (ref 136–145)
SP GR UR STRIP.AUTO: 1.02
SQUAMOUS #/AREA URNS AUTO: ABNORMAL /HPF
UROBILINOGEN UR STRIP-ACNC: 0.2
WBC # SPEC AUTO: 8.13 X10(3)/MCL (ref 4.5–11.5)
WBC #/AREA URNS AUTO: ABNORMAL /HPF

## 2023-08-09 PROCEDURE — 85025 COMPLETE CBC W/AUTO DIFF WBC: CPT | Performed by: PHYSICAL MEDICINE & REHABILITATION

## 2023-08-09 PROCEDURE — 99285 EMERGENCY DEPT VISIT HI MDM: CPT | Mod: 25,27

## 2023-08-09 PROCEDURE — 96374 THER/PROPH/DIAG INJ IV PUSH: CPT

## 2023-08-09 PROCEDURE — 99285 EMERGENCY DEPT VISIT HI MDM: CPT | Mod: 25

## 2023-08-09 PROCEDURE — 11000001 HC ACUTE MED/SURG PRIVATE ROOM

## 2023-08-09 PROCEDURE — 63600175 PHARM REV CODE 636 W HCPCS: Performed by: INTERNAL MEDICINE

## 2023-08-09 PROCEDURE — 63600175 PHARM REV CODE 636 W HCPCS: Performed by: PHYSICAL MEDICINE & REHABILITATION

## 2023-08-09 PROCEDURE — 81001 URINALYSIS AUTO W/SCOPE: CPT | Performed by: PHYSICAL MEDICINE & REHABILITATION

## 2023-08-09 PROCEDURE — 96375 TX/PRO/DX INJ NEW DRUG ADDON: CPT

## 2023-08-09 PROCEDURE — 25000003 PHARM REV CODE 250: Performed by: INTERNAL MEDICINE

## 2023-08-09 PROCEDURE — 80053 COMPREHEN METABOLIC PANEL: CPT | Performed by: PHYSICAL MEDICINE & REHABILITATION

## 2023-08-09 PROCEDURE — 25000003 PHARM REV CODE 250: Performed by: PHYSICAL MEDICINE & REHABILITATION

## 2023-08-09 PROCEDURE — 96361 HYDRATE IV INFUSION ADD-ON: CPT

## 2023-08-09 RX ORDER — LEVOFLOXACIN 5 MG/ML
500 INJECTION, SOLUTION INTRAVENOUS
Status: DISCONTINUED | OUTPATIENT
Start: 2023-08-09 | End: 2023-08-09 | Stop reason: HOSPADM

## 2023-08-09 RX ORDER — HYDROMORPHONE HYDROCHLORIDE 2 MG/ML
1 INJECTION, SOLUTION INTRAMUSCULAR; INTRAVENOUS; SUBCUTANEOUS
Status: COMPLETED | OUTPATIENT
Start: 2023-08-09 | End: 2023-08-09

## 2023-08-09 RX ORDER — SODIUM CHLORIDE 9 MG/ML
1000 INJECTION, SOLUTION INTRAVENOUS
Status: COMPLETED | OUTPATIENT
Start: 2023-08-09 | End: 2023-08-09

## 2023-08-09 RX ORDER — MORPHINE SULFATE 2 MG/ML
2 INJECTION, SOLUTION INTRAMUSCULAR; INTRAVENOUS
Status: COMPLETED | OUTPATIENT
Start: 2023-08-09 | End: 2023-08-09

## 2023-08-09 RX ORDER — PROCHLORPERAZINE EDISYLATE 5 MG/ML
10 INJECTION INTRAMUSCULAR; INTRAVENOUS
Status: COMPLETED | OUTPATIENT
Start: 2023-08-09 | End: 2023-08-09

## 2023-08-09 RX ORDER — TAMSULOSIN HYDROCHLORIDE 0.4 MG/1
0.4 CAPSULE ORAL
Status: COMPLETED | OUTPATIENT
Start: 2023-08-09 | End: 2023-08-09

## 2023-08-09 RX ORDER — ATORVASTATIN CALCIUM 40 MG/1
40 TABLET, FILM COATED ORAL DAILY
Qty: 90 TABLET | Refills: 1 | Status: SHIPPED | OUTPATIENT
Start: 2023-08-09 | End: 2023-12-19 | Stop reason: SDUPTHER

## 2023-08-09 RX ORDER — KETOROLAC TROMETHAMINE 30 MG/ML
30 INJECTION, SOLUTION INTRAMUSCULAR; INTRAVENOUS
Status: COMPLETED | OUTPATIENT
Start: 2023-08-09 | End: 2023-08-09

## 2023-08-09 RX ORDER — AMLODIPINE BESYLATE 5 MG/1
5 TABLET ORAL
Status: COMPLETED | OUTPATIENT
Start: 2023-08-09 | End: 2023-08-09

## 2023-08-09 RX ORDER — DIPHENHYDRAMINE HYDROCHLORIDE 50 MG/ML
12.5 INJECTION INTRAMUSCULAR; INTRAVENOUS
Status: COMPLETED | OUTPATIENT
Start: 2023-08-09 | End: 2023-08-09

## 2023-08-09 RX ORDER — ONDANSETRON 4 MG/1
4 TABLET, ORALLY DISINTEGRATING ORAL
Status: COMPLETED | OUTPATIENT
Start: 2023-08-09 | End: 2023-08-09

## 2023-08-09 RX ADMIN — MORPHINE SULFATE 2 MG: 2 INJECTION, SOLUTION INTRAMUSCULAR; INTRAVENOUS at 07:08

## 2023-08-09 RX ADMIN — HYDROMORPHONE HYDROCHLORIDE 1 MG: 2 INJECTION, SOLUTION INTRAMUSCULAR; INTRAVENOUS; SUBCUTANEOUS at 11:08

## 2023-08-09 RX ADMIN — TAMSULOSIN HYDROCHLORIDE 0.4 MG: 0.4 CAPSULE ORAL at 08:08

## 2023-08-09 RX ADMIN — KETOROLAC TROMETHAMINE 30 MG: 30 INJECTION, SOLUTION INTRAMUSCULAR; INTRAVENOUS at 06:08

## 2023-08-09 RX ADMIN — SODIUM CHLORIDE 1000 ML: 9 INJECTION, SOLUTION INTRAVENOUS at 06:08

## 2023-08-09 RX ADMIN — DIPHENHYDRAMINE HYDROCHLORIDE 12.5 MG: 50 INJECTION, SOLUTION INTRAMUSCULAR; INTRAVENOUS at 07:08

## 2023-08-09 RX ADMIN — SODIUM CHLORIDE 1000 ML: 9 INJECTION, SOLUTION INTRAVENOUS at 11:08

## 2023-08-09 RX ADMIN — ONDANSETRON 4 MG: 4 TABLET, ORALLY DISINTEGRATING ORAL at 06:08

## 2023-08-09 RX ADMIN — PROCHLORPERAZINE EDISYLATE 10 MG: 5 INJECTION, SOLUTION INTRAMUSCULAR; INTRAVENOUS at 11:08

## 2023-08-09 RX ADMIN — AMLODIPINE BESYLATE 5 MG: 5 TABLET ORAL at 08:08

## 2023-08-09 NOTE — ED NOTES
Pt ambulated to ER room 2 with steady gait.  Family at the bedside.  Pt reports pain to right flank area that wraps around to right lower abd that started yesterday.  Stated that she has never had a pain this bad before and rates pain a 10/10 on pain scale.  Confesses that she took a pain tablet left over from a previous surgery for the pain, but believes she threw it all up.

## 2023-08-10 PROBLEM — N13.9 OBSTRUCTIVE UROPATHY: Status: ACTIVE | Noted: 2023-08-10

## 2023-08-10 PROBLEM — E11.9 T2DM (TYPE 2 DIABETES MELLITUS): Status: ACTIVE | Noted: 2023-08-10

## 2023-08-10 PROBLEM — I10 HTN (HYPERTENSION): Status: ACTIVE | Noted: 2023-08-10

## 2023-08-10 PROBLEM — N20.1 URETEROLITHIASIS: Status: ACTIVE | Noted: 2023-08-10

## 2023-08-10 LAB
ALBUMIN SERPL-MCNC: 4.1 G/DL (ref 3.4–4.8)
ALBUMIN/GLOB SERPL: 1.4 RATIO (ref 1.1–2)
ALP SERPL-CCNC: 59 UNIT/L (ref 40–150)
ALT SERPL-CCNC: 62 UNIT/L (ref 0–55)
AST SERPL-CCNC: 37 UNIT/L (ref 5–34)
BASOPHILS # BLD AUTO: 0.04 X10(3)/MCL
BASOPHILS NFR BLD AUTO: 0.6 %
BILIRUB SERPL-MCNC: 0.5 MG/DL
BUN SERPL-MCNC: 16 MG/DL (ref 9.8–20.1)
CALCIUM SERPL-MCNC: 8.3 MG/DL (ref 8.4–10.2)
CHLORIDE SERPL-SCNC: 103 MMOL/L (ref 98–107)
CO2 SERPL-SCNC: 25 MMOL/L (ref 23–31)
CREAT SERPL-MCNC: 1.17 MG/DL (ref 0.55–1.02)
EOSINOPHIL # BLD AUTO: 0.02 X10(3)/MCL (ref 0–0.9)
EOSINOPHIL NFR BLD AUTO: 0.3 %
ERYTHROCYTE [DISTWIDTH] IN BLOOD BY AUTOMATED COUNT: 12.5 % (ref 11.5–17)
GFR SERPLBLD CREATININE-BSD FMLA CKD-EPI: 53 MLS/MIN/1.73/M2
GLOBULIN SER-MCNC: 2.9 GM/DL (ref 2.4–3.5)
GLUCOSE SERPL-MCNC: 163 MG/DL (ref 82–115)
HCT VFR BLD AUTO: 35.1 % (ref 37–47)
HGB BLD-MCNC: 11.4 G/DL (ref 12–16)
IMM GRANULOCYTES # BLD AUTO: 0.02 X10(3)/MCL (ref 0–0.04)
IMM GRANULOCYTES NFR BLD AUTO: 0.3 %
INR PPP: 1
LYMPHOCYTES # BLD AUTO: 1.83 X10(3)/MCL (ref 0.6–4.6)
LYMPHOCYTES NFR BLD AUTO: 27.4 %
MCH RBC QN AUTO: 32.1 PG (ref 27–31)
MCHC RBC AUTO-ENTMCNC: 32.5 G/DL (ref 33–36)
MCV RBC AUTO: 98.9 FL (ref 80–94)
MONOCYTES # BLD AUTO: 0.82 X10(3)/MCL (ref 0.1–1.3)
MONOCYTES NFR BLD AUTO: 12.3 %
NEUTROPHILS # BLD AUTO: 3.96 X10(3)/MCL (ref 2.1–9.2)
NEUTROPHILS NFR BLD AUTO: 59.1 %
PLATELET # BLD AUTO: 235 X10(3)/MCL (ref 130–400)
PMV BLD AUTO: 9.1 FL (ref 7.4–10.4)
POCT GLUCOSE: 145 MG/DL (ref 70–110)
POCT GLUCOSE: 157 MG/DL (ref 70–110)
POCT GLUCOSE: 159 MG/DL (ref 70–110)
POTASSIUM SERPL-SCNC: 4.1 MMOL/L (ref 3.5–5.1)
PROT SERPL-MCNC: 7 GM/DL (ref 5.8–7.6)
PROTHROMBIN TIME: 13.4 SECONDS (ref 12.5–14.5)
RBC # BLD AUTO: 3.55 X10(6)/MCL (ref 4.2–5.4)
SODIUM SERPL-SCNC: 140 MMOL/L (ref 136–145)
WBC # SPEC AUTO: 6.69 X10(3)/MCL (ref 4.5–11.5)

## 2023-08-10 PROCEDURE — 25000003 PHARM REV CODE 250: Performed by: INTERNAL MEDICINE

## 2023-08-10 PROCEDURE — 85025 COMPLETE CBC W/AUTO DIFF WBC: CPT | Performed by: INTERNAL MEDICINE

## 2023-08-10 PROCEDURE — 63600175 PHARM REV CODE 636 W HCPCS: Performed by: INTERNAL MEDICINE

## 2023-08-10 PROCEDURE — 85610 PROTHROMBIN TIME: CPT | Performed by: INTERNAL MEDICINE

## 2023-08-10 PROCEDURE — 27000221 HC OXYGEN, UP TO 24 HOURS

## 2023-08-10 PROCEDURE — 94761 N-INVAS EAR/PLS OXIMETRY MLT: CPT

## 2023-08-10 PROCEDURE — 11000001 HC ACUTE MED/SURG PRIVATE ROOM

## 2023-08-10 PROCEDURE — 80053 COMPREHEN METABOLIC PANEL: CPT | Performed by: INTERNAL MEDICINE

## 2023-08-10 RX ORDER — PANTOPRAZOLE SODIUM 40 MG/1
40 TABLET, DELAYED RELEASE ORAL DAILY
Status: DISCONTINUED | OUTPATIENT
Start: 2023-08-10 | End: 2023-08-12 | Stop reason: HOSPADM

## 2023-08-10 RX ORDER — SODIUM CHLORIDE 0.9 % (FLUSH) 0.9 %
10 SYRINGE (ML) INJECTION
Status: DISCONTINUED | OUTPATIENT
Start: 2023-08-10 | End: 2023-08-12 | Stop reason: HOSPADM

## 2023-08-10 RX ORDER — TALC
6 POWDER (GRAM) TOPICAL NIGHTLY PRN
Status: DISCONTINUED | OUTPATIENT
Start: 2023-08-10 | End: 2023-08-12 | Stop reason: HOSPADM

## 2023-08-10 RX ORDER — ATORVASTATIN CALCIUM 40 MG/1
40 TABLET, FILM COATED ORAL DAILY
Status: DISCONTINUED | OUTPATIENT
Start: 2023-08-10 | End: 2023-08-12 | Stop reason: HOSPADM

## 2023-08-10 RX ORDER — HYDROMORPHONE HYDROCHLORIDE 2 MG/ML
1 INJECTION, SOLUTION INTRAMUSCULAR; INTRAVENOUS; SUBCUTANEOUS EVERY 4 HOURS PRN
Status: DISCONTINUED | OUTPATIENT
Start: 2023-08-10 | End: 2023-08-12 | Stop reason: HOSPADM

## 2023-08-10 RX ORDER — POTASSIUM CHLORIDE 20 MEQ/1
40 TABLET, EXTENDED RELEASE ORAL ONCE
Status: COMPLETED | OUTPATIENT
Start: 2023-08-10 | End: 2023-08-10

## 2023-08-10 RX ORDER — INSULIN ASPART 100 [IU]/ML
0-5 INJECTION, SOLUTION INTRAVENOUS; SUBCUTANEOUS EVERY 6 HOURS PRN
Status: DISCONTINUED | OUTPATIENT
Start: 2023-08-10 | End: 2023-08-12 | Stop reason: HOSPADM

## 2023-08-10 RX ORDER — PROCHLORPERAZINE EDISYLATE 5 MG/ML
5 INJECTION INTRAMUSCULAR; INTRAVENOUS EVERY 6 HOURS PRN
Status: DISCONTINUED | OUTPATIENT
Start: 2023-08-10 | End: 2023-08-12 | Stop reason: HOSPADM

## 2023-08-10 RX ORDER — AMLODIPINE BESYLATE 5 MG/1
5 TABLET ORAL DAILY
Status: DISCONTINUED | OUTPATIENT
Start: 2023-08-10 | End: 2023-08-12 | Stop reason: HOSPADM

## 2023-08-10 RX ORDER — MORPHINE SULFATE 4 MG/ML
2 INJECTION, SOLUTION INTRAMUSCULAR; INTRAVENOUS EVERY 4 HOURS PRN
Status: DISCONTINUED | OUTPATIENT
Start: 2023-08-10 | End: 2023-08-12 | Stop reason: HOSPADM

## 2023-08-10 RX ORDER — AMOXICILLIN 250 MG
1 CAPSULE ORAL 2 TIMES DAILY
Status: DISCONTINUED | OUTPATIENT
Start: 2023-08-10 | End: 2023-08-12 | Stop reason: HOSPADM

## 2023-08-10 RX ORDER — GLUCAGON 1 MG
1 KIT INJECTION
Status: DISCONTINUED | OUTPATIENT
Start: 2023-08-10 | End: 2023-08-12 | Stop reason: HOSPADM

## 2023-08-10 RX ORDER — HYDRALAZINE HYDROCHLORIDE 20 MG/ML
10 INJECTION INTRAMUSCULAR; INTRAVENOUS EVERY 6 HOURS PRN
Status: DISCONTINUED | OUTPATIENT
Start: 2023-08-10 | End: 2023-08-12 | Stop reason: HOSPADM

## 2023-08-10 RX ORDER — ONDANSETRON 2 MG/ML
4 INJECTION INTRAMUSCULAR; INTRAVENOUS EVERY 8 HOURS PRN
Status: DISCONTINUED | OUTPATIENT
Start: 2023-08-10 | End: 2023-08-12 | Stop reason: HOSPADM

## 2023-08-10 RX ORDER — SODIUM CHLORIDE 9 MG/ML
INJECTION, SOLUTION INTRAVENOUS CONTINUOUS
Status: DISCONTINUED | OUTPATIENT
Start: 2023-08-10 | End: 2023-08-12

## 2023-08-10 RX ORDER — CIPROFLOXACIN 2 MG/ML
400 INJECTION, SOLUTION INTRAVENOUS
Status: DISCONTINUED | OUTPATIENT
Start: 2023-08-10 | End: 2023-08-11

## 2023-08-10 RX ADMIN — ATORVASTATIN CALCIUM 40 MG: 40 TABLET, FILM COATED ORAL at 08:08

## 2023-08-10 RX ADMIN — MORPHINE SULFATE 2 MG: 4 INJECTION INTRAVENOUS at 09:08

## 2023-08-10 RX ADMIN — MORPHINE SULFATE 2 MG: 4 INJECTION INTRAVENOUS at 11:08

## 2023-08-10 RX ADMIN — AMLODIPINE BESYLATE 5 MG: 5 TABLET ORAL at 08:08

## 2023-08-10 RX ADMIN — MORPHINE SULFATE 2 MG: 4 INJECTION INTRAVENOUS at 04:08

## 2023-08-10 RX ADMIN — SODIUM CHLORIDE: 9 INJECTION, SOLUTION INTRAVENOUS at 07:08

## 2023-08-10 RX ADMIN — SODIUM CHLORIDE: 9 INJECTION, SOLUTION INTRAVENOUS at 06:08

## 2023-08-10 RX ADMIN — HYDROMORPHONE HYDROCHLORIDE 1 MG: 2 INJECTION, SOLUTION INTRAMUSCULAR; INTRAVENOUS; SUBCUTANEOUS at 06:08

## 2023-08-10 RX ADMIN — SENNOSIDES AND DOCUSATE SODIUM 1 TABLET: 50; 8.6 TABLET ORAL at 08:08

## 2023-08-10 RX ADMIN — HYDROMORPHONE HYDROCHLORIDE 1 MG: 2 INJECTION, SOLUTION INTRAMUSCULAR; INTRAVENOUS; SUBCUTANEOUS at 08:08

## 2023-08-10 RX ADMIN — CIPROFLOXACIN 400 MG: 400 INJECTION, SOLUTION INTRAVENOUS at 04:08

## 2023-08-10 RX ADMIN — HYDROMORPHONE HYDROCHLORIDE 1 MG: 2 INJECTION, SOLUTION INTRAMUSCULAR; INTRAVENOUS; SUBCUTANEOUS at 01:08

## 2023-08-10 RX ADMIN — PANTOPRAZOLE SODIUM 40 MG: 40 TABLET, DELAYED RELEASE ORAL at 08:08

## 2023-08-10 RX ADMIN — POTASSIUM CHLORIDE 40 MEQ: 1500 TABLET, EXTENDED RELEASE ORAL at 04:08

## 2023-08-10 RX ADMIN — HYDROMORPHONE HYDROCHLORIDE 1 MG: 2 INJECTION, SOLUTION INTRAMUSCULAR; INTRAVENOUS; SUBCUTANEOUS at 10:08

## 2023-08-10 NOTE — SUBJECTIVE & OBJECTIVE
Past Medical History:   Diagnosis Date    History of gout 2022    Lumbar degenerative disc disease 2023    Pre-diabetes 2022    Primary osteoarthritis of right knee 2023       Past Surgical History:   Procedure Laterality Date    ARTHROSCOPY,KNEE,WITH MENISCUS REPAIR Right 2023    Procedure: ARTHROSCOPY,KNEE,WITH MENISCUS REPAIR;  Surgeon: Shay Daniels Jr., MD;  Location: [facility];  Service: Orthopedics;  Laterality: Right;    BUNIONECTOMY Bilateral      SECTION      x 3    CHOLECYSTECTOMY      COLECTOMY      ENDOMETRIAL ABLATION      thumb surgery      TONSILLECTOMY         Review of patient's allergies indicates:   Allergen Reactions    Allopurinol analogues Rash    Percocet [oxycodone-acetaminophen] Itching       Current Facility-Administered Medications on File Prior to Encounter   Medication    [COMPLETED] amLODIPine tablet 5 mg    [COMPLETED] diphenhydrAMINE injection 12.5 mg    [COMPLETED] ketorolac injection 30 mg    [COMPLETED] morphine injection 2 mg    [COMPLETED] morphine injection 2 mg    [COMPLETED] ondansetron disintegrating tablet 4 mg    [COMPLETED] sodium chloride 0.9% bolus 1,000 mL 1,000 mL    [COMPLETED] tamsulosin 24 hr capsule 0.4 mg    [DISCONTINUED] levoFLOXacin 500 mg/100 mL IVPB 500 mg     Current Outpatient Medications on File Prior to Encounter   Medication Sig    amLODIPine (NORVASC) 5 MG tablet Take 1 tablet (5 mg total) by mouth once daily.    atorvastatin (LIPITOR) 40 MG tablet Take 1 tablet (40 mg total) by mouth once daily.    omeprazole (PRILOSEC) 20 MG capsule Take 1 capsule (20 mg total) by mouth once daily.    blood sugar diagnostic Strp To check BG 1 times daily, to use with insurance preferred meter    blood-glucose meter kit To check BG 1 times daily, to use with insurance preferred meter    icosapent ethyL (VASCEPA) 1 gram Cap Take 2 capsules (2 g total) by mouth 2 (two) times daily.    lancets Misc To check BG 1 times daily, to use with  insurance preferred meter    meloxicam (MOBIC) 15 MG tablet Take 15 mg by mouth.     Family History       Problem Relation (Age of Onset)    Breast cancer Mother    Diabetes Mother    Heart disease Mother    No Known Problems Father    Stroke Mother          Tobacco Use    Smoking status: Former     Current packs/day: 0.00     Average packs/day: 0.8 packs/day for 15.0 years (11.3 ttl pk-yrs)     Types: Cigarettes     Start date:      Quit date:      Years since quittin.6    Smokeless tobacco: Never   Substance and Sexual Activity    Alcohol use: Yes     Alcohol/week: 5.0 standard drinks of alcohol     Types: 5 Drinks containing 0.5 oz of alcohol per week     Comment: sociallly    Drug use: Never    Sexual activity: Yes     Review of Systems  Except as documented, all other systems are negative.      Objective:     Vital Signs (Most Recent):  Temp: 98.1 °F (36.7 °C) (23)  Pulse: 94 (23)  Resp: 18 (23)  BP: (!) 198/86 (23)  SpO2: 95 % (23) Vital Signs (24h Range):  Temp:  [97.3 °F (36.3 °C)-98.1 °F (36.7 °C)] 98.1 °F (36.7 °C)  Pulse:  [76-96] 94  Resp:  [15-20] 18  SpO2:  [93 %-97 %] 95 %  BP: (174-198)/() 198/86     Weight: 79.4 kg (175 lb)  Body mass index is 31 kg/m².     Physical Exam     CONSTITUTIONAL: vitals as noted, alert, awake, no distress  HEENT: No scleral icterus, oropharynx clear  RESPIRATORY: clear to auscultation  CVS: regular rate and rhythm  GASTROINTESTINAL: soft, non-tender, non-distended  : no tenderness but received dilaudid in ER  NEURO: grossly normal exam, moves all extremities  HEM/LYMPH: no lymphadenopathy  PSYCH: alert and oriented x 3, normal affect  SKIN: no rashes noted         Significant Labs: All pertinent labs within the past 24 hours have been reviewed.  CBC:   Recent Labs   Lab 23  1839   WBC 8.13   HGB 13.4   HCT 39.5        CMP:   Recent Labs   Lab 23  1839      K 3.3*   CO2 26    BUN 14.0   CREATININE 0.94   CALCIUM 9.6   ALBUMIN 5.0*   BILITOT 0.6   ALKPHOS 72   AST 56*   ALT 86*       Significant Imaging: I have reviewed all pertinent imaging results/findings within the past 24 hours. CT Renal:     Right kidney is dilated and edematous with perinephric and proximal periureteral strandings and small amount of fluid.  There is moderate dilatation of the right kidney collecting system and the proximal ureter.  There is right proximal ureteral 6 mm calculus which is seen on image 65 series 2.  This calculus is approximately 4 cm distal from ureteropelvic junction.  No additional right renal calculi.

## 2023-08-10 NOTE — ED NOTES
Pt informed of transfer and agreeable. Acceptance at [facility].  called to inform of transfer.

## 2023-08-10 NOTE — PLAN OF CARE
Problem: Adult Inpatient Plan of Care  Goal: Plan of Care Review  8/10/2023 1724 by Evelyn Wayne RN  Outcome: Ongoing, Progressing  8/10/2023 1723 by Evelyn Wayne RN  Outcome: Ongoing, Progressing  Goal: Patient-Specific Goal (Individualized)  8/10/2023 1724 by Evelyn Wayne RN  Outcome: Ongoing, Progressing  8/10/2023 1723 by Evelyn Wayne RN  Outcome: Ongoing, Progressing  Goal: Absence of Hospital-Acquired Illness or Injury  8/10/2023 1724 by Evelyn Wayne RN  Outcome: Ongoing, Progressing  8/10/2023 1723 by Evelyn Wayne RN  Outcome: Ongoing, Progressing  Goal: Optimal Comfort and Wellbeing  8/10/2023 1724 by Evelyn Wayne RN  Outcome: Ongoing, Progressing  8/10/2023 1723 by Evelyn Wayne RN  Outcome: Ongoing, Progressing  Goal: Readiness for Transition of Care  8/10/2023 1724 by Evelyn Wayne RN  Outcome: Ongoing, Progressing  8/10/2023 1723 by Evelyn Wayne RN  Outcome: Ongoing, Progressing     Problem: Diabetes Comorbidity  Goal: Blood Glucose Level Within Targeted Range  8/10/2023 1724 by Evelyn Wayne RN  Outcome: Ongoing, Progressing  8/10/2023 1723 by Evelyn Wayne RN  Outcome: Ongoing, Progressing     Problem: Pain Acute  Goal: Acceptable Pain Control and Functional Ability  8/10/2023 1724 by Evelyn Wayne RN  Outcome: Ongoing, Progressing  8/10/2023 1723 by Evelyn Wayne RN  Outcome: Ongoing, Progressing

## 2023-08-10 NOTE — ED NOTES
Pt reports pain now 9/10. MD notified. Pt educated on the effect of opioids and how she is having the decrease in her SPO2 and the effect that further opiods could have. Heat pack provided for pt. Pt educated on covering heating pack and moving spots to avoid burning skin.

## 2023-08-10 NOTE — ED PROVIDER NOTES
Encounter Date: 2023       History     Chief Complaint   Patient presents with    Back Pain     Pt transfer from [place] with dx of kidney stone.     Patient presents with chief complaint of right-sided flank pain with radiation to her right lower abdomen pain became severe last night, states has been on and off for the past 2 weeks patient denies any trauma denies history of previous renal colic denies any fever or chills patient was nauseous and vomited at present patient can not find position of comfort states pain is 10/10.  She was seen in the ER in [place] and found to have an obstructing stone so she was transferred to this emergency department for Urology.  She is never had any type of cystoscopy or urologic procedure in the past.  She does admit to a history of stones and had an urgent care visit once and was treated as an outpatient and passed it on her own however this time she was unable to get any relief so she presented to the emergency department in the IV meds did not give her any relief to be able to be discharged and follow up as outpatient so she was transferred for inpatient care     The history is provided by the patient.       Review of patient's allergies indicates:   Allergen Reactions    Allopurinol analogues Rash    Percocet [oxycodone-acetaminophen] Itching     Past Medical History:   Diagnosis Date    History of gout 2022    Lumbar degenerative disc disease 2023    Pre-diabetes 2022    Primary osteoarthritis of right knee 2023     Past Surgical History:   Procedure Laterality Date    ARTHROSCOPY,KNEE,WITH MENISCUS REPAIR Right 2023    Procedure: ARTHROSCOPY,KNEE,WITH MENISCUS REPAIR;  Surgeon: Shay Daniels Jr., MD;  Location: [facility];  Service: Orthopedics;  Laterality: Right;    BUNIONECTOMY Bilateral      SECTION      x 3    CHOLECYSTECTOMY      COLECTOMY      ENDOMETRIAL ABLATION      thumb surgery      TONSILLECTOMY       Family History   Problem  Relation Age of Onset    Diabetes Mother     Breast cancer Mother     Heart disease Mother     Stroke Mother     No Known Problems Father      Social History     Tobacco Use    Smoking status: Former     Current packs/day: 0.00     Average packs/day: 0.8 packs/day for 15.0 years (11.3 ttl pk-yrs)     Types: Cigarettes     Start date:      Quit date:      Years since quittin.6    Smokeless tobacco: Never   Substance Use Topics    Alcohol use: Yes     Alcohol/week: 5.0 standard drinks of alcohol     Types: 5 Drinks containing 0.5 oz of alcohol per week     Comment: sociallly    Drug use: Never     Review of Systems   Constitutional: Negative.  Negative for activity change, appetite change, chills, diaphoresis, fatigue, fever and unexpected weight change.   HENT: Negative.  Negative for congestion, dental problem, drooling, ear discharge, ear pain, facial swelling, hearing loss, mouth sores, nosebleeds, postnasal drip, rhinorrhea, sinus pressure, sinus pain, sneezing, sore throat, tinnitus, trouble swallowing and voice change.    Eyes: Negative.  Negative for photophobia, pain, discharge, redness, itching and visual disturbance.   Respiratory: Negative.  Negative for apnea, cough, choking, chest tightness, shortness of breath, wheezing and stridor.    Cardiovascular: Negative.  Negative for chest pain, palpitations and leg swelling.   Gastrointestinal: Negative.  Negative for abdominal distention, abdominal pain, anal bleeding, blood in stool, constipation, diarrhea, nausea, rectal pain and vomiting.   Endocrine: Negative.  Negative for cold intolerance, heat intolerance, polydipsia, polyphagia and polyuria.   Genitourinary:  Positive for flank pain. Negative for decreased urine volume, difficulty urinating, dyspareunia, dysuria, enuresis, frequency, genital sores, hematuria, menstrual problem, pelvic pain, urgency, vaginal bleeding, vaginal discharge and vaginal pain.   Musculoskeletal:  Negative for  arthralgias, back pain, gait problem, joint swelling, myalgias, neck pain and neck stiffness.   Skin: Negative.  Negative for color change, pallor, rash and wound.   Allergic/Immunologic: Negative.  Negative for environmental allergies, food allergies and immunocompromised state.   Neurological: Negative.  Negative for dizziness, tremors, seizures, syncope, facial asymmetry, speech difficulty, weakness, light-headedness, numbness and headaches.   Hematological: Negative.  Negative for adenopathy. Does not bruise/bleed easily.   Psychiatric/Behavioral: Negative.  Negative for agitation, behavioral problems, confusion, decreased concentration, dysphoric mood, hallucinations, self-injury, sleep disturbance and suicidal ideas. The patient is not nervous/anxious and is not hyperactive.    All other systems reviewed and are negative.      Physical Exam     Initial Vitals [08/09/23 2317]   BP Pulse Resp Temp SpO2   (!) 189/104 76 20 98.1 °F (36.7 °C) 95 %      MAP       --         Physical Exam    Nursing note and vitals reviewed.  Constitutional: She appears well-developed and well-nourished. She is not diaphoretic. No distress.   HENT:   Head: Normocephalic and atraumatic.   Mouth/Throat: Oropharynx is clear and moist. No oropharyngeal exudate.   Eyes: Conjunctivae and EOM are normal. Pupils are equal, round, and reactive to light. No scleral icterus.   Neck: Neck supple. No JVD present.   Normal range of motion.  Cardiovascular:  Normal rate, regular rhythm, normal heart sounds and intact distal pulses.     Exam reveals no gallop and no friction rub.       No murmur heard.  Pulmonary/Chest: Breath sounds normal. No respiratory distress. She has no wheezes. She exhibits no tenderness.   Abdominal: Abdomen is soft. Bowel sounds are normal. She exhibits no distension. There is abdominal tenderness.   Patient with right sided flank pain positive CVAT on the right with mild tenderness along the right lateral abdomen  There  is no rebound.   Musculoskeletal:         General: Normal range of motion.      Cervical back: Normal range of motion and neck supple.     Neurological: She is alert and oriented to person, place, and time. She has normal strength.   Skin: Skin is warm and dry. Capillary refill takes less than 2 seconds.   Psychiatric: She has a normal mood and affect. Her behavior is normal. Judgment and thought content normal.         ED Course   Procedures  Labs Reviewed - No data to display        Latest Reference Range & Units 08/09/23 18:39 08/09/23 18:59 08/09/23 19:29   WBC 4.50 - 11.50 x10(3)/mcL 8.13     RBC 4.20 - 5.40 x10(6)/mcL 4.16 (L)     Hemoglobin 12.0 - 16.0 g/dL 13.4     Hematocrit 37.0 - 47.0 % 39.5     MCV 80.0 - 94.0 fL 95.0 (H)     MCH 27.0 - 31.0 pg 32.2 (H)     MCHC 33.0 - 36.0 g/dL 33.9     RDW 11.5 - 17.0 % 12.4     Platelets 130 - 400 x10(3)/mcL 257     MPV 7.4 - 10.4 fL 8.8     Neut % % 70.2     LYMPH % % 21.5     Mono % % 6.6     Eosinophil % % 0.5     Basophil % % 0.5     Immature Granulocytes % 0.7     Neut # 2.1 - 9.2 x10(3)/mcL 5.70     Lymph # 0.6 - 4.6 x10(3)/mcL 1.75     Mono # 0.1 - 1.3 x10(3)/mcL 0.54     Eos # 0 - 0.9 x10(3)/mcL 0.04     Baso # <=0.2 x10(3)/mcL 0.04     Immature Grans (Abs) 0 - 0.04 x10(3)/mcL 0.06 (H)     nRBC % 0.0     Sodium 136 - 145 mmol/L 140     Potassium 3.5 - 5.1 mmol/L 3.3 (L)     Chloride 98 - 107 mmol/L 100     CO2 23 - 31 mmol/L 26     BUN 9.8 - 20.1 mg/dL 14.0     Creatinine 0.55 - 1.02 mg/dL 0.94     eGFR mls/min/1.73/m2 >60     Glucose 82 - 115 mg/dL 214 (H)     Calcium 8.4 - 10.2 mg/dL 9.6     Alkaline Phosphatase 40 - 150 unit/L 72     PROTEIN TOTAL 5.8 - 7.6 gm/dL 8.9 (H)     Albumin 3.4 - 4.8 g/dL 5.0 (H)     Albumin/Globulin Ratio 1.1 - 2.0 ratio 1.3     BILIRUBIN TOTAL <=1.5 mg/dL 0.6     AST 5 - 34 unit/L 56 (H)     ALT 0 - 55 unit/L 86 (H)     Globulin, Total 2.4 - 3.5 gm/dL 3.9 (H)     Color, UA Yellow, Light-Yellow, Dark Yellow, [place], Straw    Yellow    Appearance, UA Clear    SL CLOUDY !   Specific Gravity,UA    1.020   pH, UA 5.0 - 8.5    6.5   Protein, UA Negative    Trace !   Glucose, UA Negative, Normal    1+ !   Ketones, UA Negative    1+ !   Occult Blood UA Negative    3+ !   NITRITE UA Negative    Negative   Bilirubin, UA Negative    Negative   Urobilinogen, UA 0.2, 1.0, Normal    0.2   Leukocytes, UA Negative    Negative   RBC, UA None Seen, 0-2, 3-5, 0-5 /HPF   >100 !   WBC, UA None Seen, 0-2, 3-5, 0-5 /HPF   3-5   Bacteria, UA None Seen, Rare, Occasional /HPF   Rare   Squam Epithel, UA None Seen, Rare, Occasional, Occ /HPF   Rare   CT RENAL STONE STUDY ABD PELVIS WO   Rpt    (L): Data is abnormally low  (H): Data is abnormally high  !: Data is abnormal  Rpt: View report in Results Review for more information  Imaging Results    None     CT Renal Stone Study ABD Pelvis WO  Order: [id number]  Status: Final result     Visible to patient: Yes (not seen)     Next appt: 08/16/2023 at 03:45 PM in Family Medicine (Angela Baez MD)     0 Result Notes  Details    Reading Physician Reading Date Result Priority   Harsh Zafar MD  [phone] 8/9/2023 STAT     Narrative & Impression  EXAMINATION:  CT RENAL STONE STUDY ABD PELVIS WO     CLINICAL HISTORY:  Nephrolithiasis, uncomplicated;     TECHNIQUE:  Multidetector axial images were obtained from the  diaphragms to below symphysis pubis without the administration of IV contrast. Oral contrast was not administered.     Dose length product of 399 mGycm. Automated exposure control was utilized to minimize radiation dose.     COMPARISON:  None available     FINDINGS:  Included lungs are without suspicious nodularity, acute air space infiltrates or fluid within the pleural spaces.     Liver is remarkable for steatosis.  Liver is also enlarged in size maximum diameter of 22.0 cm.  Within limitations of noncontrast technique, no acute findings of the liver, pancreas and spleen identified.  Gallbladder is surgically  absent..  No apparent biliary dilation. The adrenal glands noncontrast evaluation is unremarkable.     Left kidney is without acute findings.  Right kidney is dilated and edematous with perinephric and proximal periureteral strandings and small amount of fluid.  There is moderate dilatation of the right kidney collecting system and the proximal ureter.  There is right proximal ureteral 6 mm calculus which is seen on image 65 series 2.  This calculus is approximately 4 cm distal from ureteropelvic junction.  No additional right renal calculi.     Stomach is partially decompressed.  Small hiatal hernia.  There is no abnormal dilatation of loops of small bowel.  There operative changes with colon suture lines.  No abnormal dilatation of colon pericolonic acute standings.  No bowel obstruction.  No free fluid.     Urinary bladder appears within normal limits. No intravesical stone identified. There is no pelvic free fluid.     There is grade 1 retrolisthesis of L2 and L3.  L2-L3 degenerative changes.  No acute or aggressive skeletal abnormality.     Impression:     1. Right obstructive uropathy caused by a proximal ureteral calculus.     2. Details of other findings above.        Electronically signed by: Harsh Zafar  Date:                                            08/09/2023  Time:                                           19:12        Medications   0.9%  NaCl infusion (has no administration in time range)   prochlorperazine injection Soln 10 mg (has no administration in time range)   HYDROmorphone (PF) injection 1 mg (has no administration in time range)     Medical Decision Making:   Initial Assessment:   62-year-old white female with obstructive uropathy on the right transferred from [place] to [place] for urology evaluation  Differential Diagnosis:   Ureterolithiasis, nephrolithiasis, pyelonephritis, hydronephrosis, hydroureter  Clinical Tests:   Lab Tests: Reviewed  Radiological Study: Reviewed  ED  Management:  Patient reports approximately 8/10 pain currently so will order Dilaudid and Compazine.  Will hold NPO after midnight and I have called hospitalist for admission and will consult Dr. Rachel in the morning for evaluation.  Patient received levofloxacin intravenously prior to being transferred                   Spoke with night hospitalist who accepts admit then consult urology in am       Clinical Impression:   Final diagnoses:  [R10.9] Right flank pain (Primary)  [N20.1] Ureterolithiasis  [N13.30] Hydronephrosis, unspecified hydronephrosis type  [N13.9] Obstructive uropathy  [I10] Hypertension, unspecified type  [E78.5] Hyperlipidemia, unspecified hyperlipidemia type  [R73.9] Hyperglycemia        ED Disposition Condition    Admit Stable            Ashley Almazan is a certified MA and was present during the entire interaction with this patient         Eusebio Robledo MD  08/09/23 2044

## 2023-08-10 NOTE — ED PROVIDER NOTES
Encounter Date: 2023       History     Chief Complaint   Patient presents with    Back Pain     Right lower back/flank pain wrapping around to right lower abdomen since yesterday, reports blood in urine and vomiting.       Patient presents with chief complaint of right-sided flank pain with radiation to her right lower abdomen pain became severe last night, states has been on and off for the past 2 weeks patient denies any trauma denies history of previous renal colic denies any fever or chills patient was nauseous and vomited at present patient can not find position of comfort states pain is 10/10    The history is provided by the patient.     Review of patient's allergies indicates:   Allergen Reactions    Allopurinol analogues Rash    Percocet [oxycodone-acetaminophen] Itching     Past Medical History:   Diagnosis Date    History of gout 2022    Lumbar degenerative disc disease 2023    Pre-diabetes 2022    Primary osteoarthritis of right knee 2023     Past Surgical History:   Procedure Laterality Date    ARTHROSCOPY,KNEE,WITH MENISCUS REPAIR Right 2023    Procedure: ARTHROSCOPY,KNEE,WITH MENISCUS REPAIR;  Surgeon: Shay Daniels Jr., MD;  Location: [facility];  Service: Orthopedics;  Laterality: Right;    BUNIONECTOMY Bilateral      SECTION      x 3    CHOLECYSTECTOMY      COLECTOMY      ENDOMETRIAL ABLATION      thumb surgery      TONSILLECTOMY       Family History   Problem Relation Age of Onset    Diabetes Mother     Breast cancer Mother     Heart disease Mother     Stroke Mother     No Known Problems Father      Social History     Tobacco Use    Smoking status: Former     Current packs/day: 0.00     Average packs/day: 0.8 packs/day for 15.0 years (11.3 ttl pk-yrs)     Types: Cigarettes     Start date:      Quit date:      Years since quittin.6    Smokeless tobacco: Never   Substance Use Topics    Alcohol use: Yes     Alcohol/week: 5.0 standard drinks of alcohol      Types: 5 Drinks containing 0.5 oz of alcohol per week     Comment: sociallly    Drug use: Never     Review of Systems   Constitutional:  Negative for fever.   Gastrointestinal:  Positive for abdominal pain.   Genitourinary:  Positive for flank pain (Right flank) and hematuria. Negative for dysuria.   Skin:  Negative for rash.   All other systems reviewed and are negative.      Physical Exam     Initial Vitals [08/09/23 1829]   BP Pulse Resp Temp SpO2   (!) 197/88 77 20 97.3 °F (36.3 °C) 95 %      MAP       --         Physical Exam    Nursing note and vitals reviewed.  Constitutional: She appears well-developed and well-nourished.   HENT:   Head: Normocephalic and atraumatic.   Eyes: EOM are normal. Pupils are equal, round, and reactive to light.   Neck: Neck supple.   Normal range of motion.  Cardiovascular:  Normal rate, regular rhythm and normal heart sounds.           Pulmonary/Chest: Breath sounds normal.   Abdominal: Abdomen is soft. Bowel sounds are normal.   Patient with right sided flank pain positive CVAT on the right with mild tenderness along the right lateral abdomen   Musculoskeletal:         General: Normal range of motion.      Cervical back: Normal range of motion and neck supple.     Neurological: She is alert and oriented to person, place, and time. She has normal strength. GCS score is 15. GCS eye subscore is 4. GCS verbal subscore is 5. GCS motor subscore is 6.   Skin: Skin is warm and dry.   Psychiatric: She has a normal mood and affect.         ED Course   Procedures  Labs Reviewed   COMPREHENSIVE METABOLIC PANEL - Abnormal; Notable for the following components:       Result Value    Potassium Level 3.3 (*)     Glucose Level 214 (*)     Protein Total 8.9 (*)     Albumin Level 5.0 (*)     Globulin 3.9 (*)     Alanine Aminotransferase 86 (*)     Aspartate Aminotransferase 56 (*)     All other components within normal limits   URINALYSIS, REFLEX TO URINE CULTURE - Abnormal; Notable for the  following components:    Appearance, UA SL CLOUDY (*)     Protein, UA Trace (*)     Glucose, UA 1+ (*)     Ketones, UA 1+ (*)     Blood, UA 3+ (*)     All other components within normal limits   CBC WITH DIFFERENTIAL - Abnormal; Notable for the following components:    RBC 4.16 (*)     MCV 95.0 (*)     MCH 32.2 (*)     IG# 0.06 (*)     All other components within normal limits   URINALYSIS, MICROSCOPIC - Abnormal; Notable for the following components:    RBC, UA >100 (*)     All other components within normal limits   CBC W/ AUTO DIFFERENTIAL    Narrative:     The following orders were created for panel order CBC auto differential.  Procedure                               Abnormality         Status                     ---------                               -----------         ------                     CBC with Differential[[id number]]        Abnormal            Final result                 Please view results for these tests on the individual orders.          Imaging Results              CT Renal Stone Study ABD Pelvis WO (Final result)  Result time 08/09/23 19:12:14      Final result by Harsh Zafar MD (08/09/23 19:12:14)                   Impression:      1. Right obstructive uropathy caused by a proximal ureteral calculus.    2. Details of other findings above.      Electronically signed by: Harsh Zafar  Date:    08/09/2023  Time:    19:12               Narrative:    EXAMINATION:  CT RENAL STONE STUDY ABD PELVIS WO    CLINICAL HISTORY:  Nephrolithiasis, uncomplicated;    TECHNIQUE:  Multidetector axial images were obtained from the  diaphragms to below symphysis pubis without the administration of IV contrast. Oral contrast was not administered.    Dose length product of 399 mGycm. Automated exposure control was utilized to minimize radiation dose.    COMPARISON:  None available    FINDINGS:  Included lungs are without suspicious nodularity, acute air space infiltrates or fluid within the pleural  spaces.    Liver is remarkable for steatosis.  Liver is also enlarged in size maximum diameter of 22.0 cm.  Within limitations of noncontrast technique, no acute findings of the liver, pancreas and spleen identified.  Gallbladder is surgically absent..  No apparent biliary dilation. The adrenal glands noncontrast evaluation is unremarkable.    Left kidney is without acute findings.  Right kidney is dilated and edematous with perinephric and proximal periureteral strandings and small amount of fluid.  There is moderate dilatation of the right kidney collecting system and the proximal ureter.  There is right proximal ureteral 6 mm calculus which is seen on image 65 series 2.  This calculus is approximately 4 cm distal from ureteropelvic junction.  No additional right renal calculi.    Stomach is partially decompressed.  Small hiatal hernia.  There is no abnormal dilatation of loops of small bowel.  There operative changes with colon suture lines.  No abnormal dilatation of colon pericolonic acute standings.  No bowel obstruction.  No free fluid.    Urinary bladder appears within normal limits. No intravesical stone identified. There is no pelvic free fluid.    There is grade 1 retrolisthesis of L2 and L3.  L2-L3 degenerative changes.  No acute or aggressive skeletal abnormality.                                    X-Rays:   Independently Interpreted Readings:   Other Readings:  CT noted with 6 mm stone and hydronephrosis    Medications   levoFLOXacin 500 mg/100 mL IVPB 500 mg (has no administration in time range)   ketorolac injection 30 mg (30 mg Intravenous Given 8/9/23 1844)   sodium chloride 0.9% bolus 1,000 mL 1,000 mL (1,000 mLs Intravenous New Bag 8/9/23 1846)   ondansetron disintegrating tablet 4 mg (4 mg Oral Given 8/9/23 1844)   morphine injection 2 mg (2 mg Intravenous Given 8/9/23 1916)   diphenhydrAMINE injection 12.5 mg (12.5 mg Intravenous Given 8/9/23 1915)   tamsulosin 24 hr capsule 0.4 mg (0.4 mg Oral  Given 8/9/23 2000)   amLODIPine tablet 5 mg (5 mg Oral Given 8/9/23 2000)   morphine injection 2 mg (2 mg Intravenous Given 8/9/23 1959)     Medical Decision Making:   Initial Assessment:   Patient presents with chief complaint of right-sided flank pain with radiation to her right lower abdomen pain became severe last night, states has been on and off for the past 2 weeks patient denies any trauma denies history of previous renal colic denies any fever or chills patient was nauseous and vomited at present patient can not find position of comfort states pain is 10/10      Differential Diagnosis:   Right flank pain, renal colic, abdominal pain  Clinical Tests:   Lab Tests: Ordered and Reviewed  Radiological Study: Ordered and Reviewed  Medical Tests: Ordered and Reviewed  ED Management:  Patient given IV fluids, Toradol with minimal relief given morphine with improved relief we will observe patient    Following initial dose of morphine patient was comfortable with the pain did return given a 2nd dose of morphine patient is resting comfortably at this time discussed with her and her  that we will observe for period of time to decide whether we need to have a more urgent visit to the urologist    FOLLOWING 2ND DOSE OF MORPHINE PATIENT BEGAN TO DESATURATE HER OXYGEN DOWN TO 90% PLACED ON 2 L RESPONDED VERY WELL STILL WITH SOME MILD PAIN, WITH DESATURATION AND INTRACTABLE PAIN WE WILL MAKE ARRANGEMENTS TO TRANSFER FOR URGENT UROLOGY CONSULTATION IN THE MORNING    Spoke to transfer center patient is accepted to [facility] in [place] by a dr emery                          Clinical Impression:   Final diagnoses:  [N23] Renal colic on right side (Primary)  [R52] Intractable pain        ED Disposition Condition    Transfer to Another Facility Boone Hernadez DO  08/09/23 8721

## 2023-08-10 NOTE — H&P
[facility] - Medical Surgical [facility] Medicine  History & Physical    Patient Name: Nehal Araujo  MRN: [id number]   Patient Class: IP- Inpatient  Admission Date: 2023  Attending Physician: Radha Menchaca MD   Primary Care Provider: Angela Baez MD         Patient information was obtained from patient and ER records.     Subjective:     Principal Problem:Obstructive uropathy    Chief Complaint:   Chief Complaint   Patient presents with    Back Pain     Pt transfer from [place] with dx of kidney stone.        HPI: Chief complaint: R flank pain    History given by: patient    HPI: 66 year old F patient with PMH of HTN, DM, HLD presented to [facility] ER with right flank pain and hematuria. She describes pain as 10/10 in intensity, colicky and radiating to right lower abdomen. She has had the pain on and off for 2 weeks but got worse yesterday. She reports nausea and vomiting. Reports hematuria and low grade fever. She has a history of kidney stones that passed with flomax and hydration.     I personally spoke with ED clinician regarding the case and reviewed their notes. I personally reviewed all imaging and lab findings as well as any prior medical records that were available within the chart prior to admission. Workup in the ER showed right obstructive uropathy caused by proximal ureteral calculus. She was admitted for further management.       Past Medical History:   Diagnosis Date    History of gout 2022    Lumbar degenerative disc disease 2023    Pre-diabetes 2022    Primary osteoarthritis of right knee 2023       Past Surgical History:   Procedure Laterality Date    ARTHROSCOPY,KNEE,WITH MENISCUS REPAIR Right 2023    Procedure: ARTHROSCOPY,KNEE,WITH MENISCUS REPAIR;  Surgeon: Shay Daniels Jr., MD;  Location: [facility];  Service: Orthopedics;  Laterality: Right;    BUNIONECTOMY Bilateral      SECTION      x 3    CHOLECYSTECTOMY      COLECTOMY       ENDOMETRIAL ABLATION      thumb surgery      TONSILLECTOMY         Review of patient's allergies indicates:   Allergen Reactions    Allopurinol analogues Rash    Percocet [oxycodone-acetaminophen] Itching       Current Facility-Administered Medications on File Prior to Encounter   Medication    [COMPLETED] amLODIPine tablet 5 mg    [COMPLETED] diphenhydrAMINE injection 12.5 mg    [COMPLETED] ketorolac injection 30 mg    [COMPLETED] morphine injection 2 mg    [COMPLETED] morphine injection 2 mg    [COMPLETED] ondansetron disintegrating tablet 4 mg    [COMPLETED] sodium chloride 0.9% bolus 1,000 mL 1,000 mL    [COMPLETED] tamsulosin 24 hr capsule 0.4 mg    [DISCONTINUED] levoFLOXacin 500 mg/100 mL IVPB 500 mg     Current Outpatient Medications on File Prior to Encounter   Medication Sig    amLODIPine (NORVASC) 5 MG tablet Take 1 tablet (5 mg total) by mouth once daily.    atorvastatin (LIPITOR) 40 MG tablet Take 1 tablet (40 mg total) by mouth once daily.    omeprazole (PRILOSEC) 20 MG capsule Take 1 capsule (20 mg total) by mouth once daily.    blood sugar diagnostic Strp To check BG 1 times daily, to use with insurance preferred meter    blood-glucose meter kit To check BG 1 times daily, to use with insurance preferred meter    icosapent ethyL (VASCEPA) 1 gram Cap Take 2 capsules (2 g total) by mouth 2 (two) times daily.    lancets Misc To check BG 1 times daily, to use with insurance preferred meter    meloxicam (MOBIC) 15 MG tablet Take 15 mg by mouth.     Family History       Problem Relation (Age of Onset)    Breast cancer Mother    Diabetes Mother    Heart disease Mother    No Known Problems Father    Stroke Mother          Tobacco Use    Smoking status: Former     Current packs/day: 0.00     Average packs/day: 0.8 packs/day for 15.0 years (11.3 ttl pk-yrs)     Types: Cigarettes     Start date:      Quit date:      Years since quittin.6    Smokeless tobacco: Never   Substance  and Sexual Activity    Alcohol use: Yes     Alcohol/week: 5.0 standard drinks of alcohol     Types: 5 Drinks containing 0.5 oz of alcohol per week     Comment: sociallly    Drug use: Never    Sexual activity: Yes     Review of Systems  Except as documented, all other systems are negative.      Objective:     Vital Signs (Most Recent):  Temp: 98.1 °F (36.7 °C) (08/09/23 2317)  Pulse: 94 (08/09/23 2327)  Resp: 18 (08/09/23 2351)  BP: (!) 198/86 (08/09/23 2327)  SpO2: 95 % (08/09/23 2327) Vital Signs (24h Range):  Temp:  [97.3 °F (36.3 °C)-98.1 °F (36.7 °C)] 98.1 °F (36.7 °C)  Pulse:  [76-96] 94  Resp:  [15-20] 18  SpO2:  [93 %-97 %] 95 %  BP: (174-198)/() 198/86     Weight: 79.4 kg (175 lb)  Body mass index is 31 kg/m².     Physical Exam     CONSTITUTIONAL: vitals as noted, alert, awake, no distress  HEENT: No scleral icterus, oropharynx clear  RESPIRATORY: clear to auscultation  CVS: regular rate and rhythm  GASTROINTESTINAL: soft, non-tender, non-distended  : no tenderness but received dilaudid in ER  NEURO: grossly normal exam, moves all extremities  HEM/LYMPH: no lymphadenopathy  PSYCH: alert and oriented x 3, normal affect  SKIN: no rashes noted         Significant Labs: All pertinent labs within the past 24 hours have been reviewed.  CBC:   Recent Labs   Lab 08/09/23  1839   WBC 8.13   HGB 13.4   HCT 39.5        CMP:   Recent Labs   Lab 08/09/23  1839      K 3.3*   CO2 26   BUN 14.0   CREATININE 0.94   CALCIUM 9.6   ALBUMIN 5.0*   BILITOT 0.6   ALKPHOS 72   AST 56*   ALT 86*       Significant Imaging: I have reviewed all pertinent imaging results/findings within the past 24 hours. CT Renal:     Right kidney is dilated and edematous with perinephric and proximal periureteral strandings and small amount of fluid.  There is moderate dilatation of the right kidney collecting system and the proximal ureter.  There is right proximal ureteral 6 mm calculus which is seen on image 65 series 2.   This calculus is approximately 4 cm distal from ureteropelvic junction.  No additional right renal calculi.    Assessment/Plan:     * Obstructive uropathy  - NPO, pain control including IV dilaudid  - IV fluids  - urology consult  - empiric antibiotics    Ureterolithiasis  - as above  - UA shows hematuria    T2DM (type 2 diabetes mellitus)  -SSI and Accu-Cheks  -hemoglobin A1c 6.0 earlier this year    HTN (hypertension)  - home meds, prn IV hydralazine    Mixed hyperlipidemia  - home meds      VTE Risk Mitigation (From admission, onward)         Ordered     IP VTE HIGH RISK PATIENT  Once         08/10/23 0106     Place sequential compression device  Until discontinued         08/10/23 0106     Place MARY ALICE hose  Until discontinued         08/10/23 0106                 The patient is expected to have a LOS more than 2 midnights and will be admitted to inpatient status under my care    Service was provided using HIPAA compliant web platform using SOC for audio/visual equipment.   Patient Location: Hospital  Provider Location: Home ([place], [place])  Participants on call: bedside RN, patient  Consent was obtained, and the patient was seen with nurse assisting from the bedside.          Radha Menchaca MD  Department of Hospital Medicine  [facility] - [facility] Surgical Unit

## 2023-08-10 NOTE — HPI
Chief complaint: R flank pain    History given by: patient    HPI: 66 year old F patient with PMH of HTN, DM, HLD presented to [facility] ER with right flank pain and hematuria. She describes pain as 10/10 in intensity, colicky and radiating to right lower abdomen. She has had the pain on and off for 2 weeks but got worse yesterday. She reports nausea and vomiting. Reports hematuria and low grade fever. She has a history of kidney stones that passed with flomax and hydration.     I personally spoke with ED clinician regarding the case and reviewed their notes. I personally reviewed all imaging and lab findings as well as any prior medical records that were available within the chart prior to admission. Workup in the ER showed right obstructive uropathy caused by proximal ureteral calculus. She was admitted for further management.

## 2023-08-10 NOTE — ED NOTES
Pt's SP O2 began dropping to 85, pt still awake and alert but sleepy. Respirations 16 per minute. Pt placed on 4L NC. MD notified.  at bedside. Pt on continuous monitoring. Tolerated O2 well

## 2023-08-10 NOTE — PLAN OF CARE
08/10/23 1139   Discharge Assessment   Assessment Type Discharge Planning Assessment   Source of Information patient;family   People in Home spouse   Do you expect to return to your current living situation? Yes   Do you have help at home or someone to help you manage your care at home? Yes   Who are your caregiver(s) and their phone number(s)? spouse   Prior to hospitilization cognitive status: Alert/Oriented;No Deficits   Current cognitive status: Alert/Oriented;No Deficits   Equipment Currently Used at Home none   Do you currently have service(s) that help you manage your care at home? No   Do you take prescription medications? Yes   Do you have prescription coverage? Yes   Do you have any problems affording any of your prescribed medications? No   Is the patient taking medications as prescribed? yes   Who is going to help you get home at discharge? spouse   How do you get to doctors appointments? family or friend will provide   Are you on dialysis? No   Do you take coumadin? No   Discharge Plan A Home with family   Discharge Plan B Home with family   DME Needed Upon Discharge  none   Discharge Plan discussed with: Spouse/sig other;Patient   Transition of Care Barriers None   Physical Activity   On average, how many days per week do you engage in moderate to strenuous exercise (like a brisk walk)? Patient refu   On average, how many minutes do you engage in exercise at this level? Patient refu   Financial Resource Strain   How hard is it for you to pay for the very basics like food, housing, medical care, and heating? Patient refu   Housing Stability   In the last 12 months, was there a time when you were not able to pay the mortgage or rent on time? AL   In the last 12 months, was there a time when you did not have a steady place to sleep or slept in a shelter (including now)? AL   Transportation Needs   In the past 12 months, has lack of transportation kept you from medical appointments or from getting  medications? Patient refu   In the past 12 months, has lack of transportation kept you from meetings, work, or from getting things needed for daily living? Patient refu   Food Insecurity   Within the past 12 months, you worried that your food would run out before you got the money to buy more. Patient refu   Within the past 12 months, the food you bought just didn't last and you didn't have money to get more. Patient refu   Stress   Do you feel stress - tense, restless, nervous, or anxious, or unable to sleep at night because your mind is troubled all the time - these days? Patient refu   Social Connections   In a typical week, how many times do you talk on the phone with family, friends, or neighbors? Patient refu   How often do you get together with friends or relatives? Patient refu   How often do you attend Sikh or Latter-day services? Patient refu   Do you belong to any clubs or organizations such as Sikh groups, unions, fraternal or athletic groups, or school groups? Patient refu   How often do you attend meetings of the clubs or organizations you belong to? Patient refu   Are you , , , , never , or living with a partner? Patient refu   Alcohol Use   Q1: How often do you have a drink containing alcohol? Patient refu   Q2: How many drinks containing alcohol do you have on a typical day when you are drinking? Patient refu   Q3: How often do you have six or more drinks on one occasion? Patient refu

## 2023-08-10 NOTE — CONSULTS
[facility] - Medical Surgical Unit  Urology  Consult Note    Patient Name: Nehal Araujo  MRN: [id number]  Admission Date: 8/9/2023  Hospital Length of Stay: 1   Code Status: Full Code   Attending Provider: Evaristo Benoit MD   Consulting Provider: Isrrael Rachel MD  Primary Care Physician: Angela Baez MD  Principal Problem:Obstructive uropathy    Inpatient consult to Urology  Consult performed by: Isrrael Rachel MD    Subjective:     HPI:Mrs Araujo is a 62-year-old white female who has been having intermittent right flank pain for the past 2 weeks became much more severe yesterday associated with nausea and vomiting she denies any fever had some hematuria she denied any voiding problems no dysuria no incontinence she has a history of stones that she is passed spontaneously in the past CT scan reveals a 6 mm proximal right ureteral calculus with hydronephrosis I discussed treatment options with her including extracorporeal shockwave lithotripsy or ureteroscopy I explained the risks and benefits of each approach I explained that ureteroscopy is usually the procedure we do for ureteral calculi and she wishes to proceed with this for the laser to be here and we will tentatively do this tomorrow afternoon in the meantime we will strain her urine and give her supportive measures and repeat      Review of Systems   Constitutional:  Positive for activity change.   HENT: Negative.     Eyes: Negative.    Respiratory: Negative.     Cardiovascular: Negative.    Gastrointestinal:  Positive for abdominal pain, nausea and vomiting.        Right flank pain   Genitourinary:  Positive for flank pain and hematuria.   Skin: Negative.    Neurological: Negative.    Psychiatric/Behavioral: Negative.         Past Medical History:   Diagnosis Date    History of gout 12/8/2022    Lumbar degenerative disc disease 2/7/2023    Pre-diabetes 12/8/2022    Primary osteoarthritis of right knee 2/7/2023       Past Surgical History:    Procedure Laterality Date    ARTHROSCOPY,KNEE,WITH MENISCUS REPAIR Right 2023    Procedure: ARTHROSCOPY,KNEE,WITH MENISCUS REPAIR;  Surgeon: Shay Daniels Jr., MD;  Location: [facility] OR;  Service: Orthopedics;  Laterality: Right;    BUNIONECTOMY Bilateral      SECTION      x 3    CHOLECYSTECTOMY      COLECTOMY      ENDOMETRIAL ABLATION      thumb surgery      TONSILLECTOMY          Current Facility-Administered Medications   Medication Dose Route Frequency Provider Last Rate Last Admin    0.9%  NaCl infusion   Intravenous Continuous Radha Menchaca  mL/hr at 08/10/23 0645 New Bag at 08/10/23 0645    amLODIPine tablet 5 mg  5 mg Oral Daily Radha Menchaca MD   5 mg at 08/10/23 0835    atorvastatin tablet 40 mg  40 mg Oral Daily Radha Menchaca MD   40 mg at 08/10/23 0835    ciprofloxacin (CIPRO)400mg/200ml D5W IVPB 400 mg  400 mg Intravenous Q12H Eusebio Robledo MD   Stopped at 08/10/23 0532    glucagon (human recombinant) injection 1 mg  1 mg Intramuscular PRN Eusebio Robledo MD        hydrALAZINE injection 10 mg  10 mg Intravenous Q6H PRN Radha Menchaca MD        HYDROmorphone (PF) injection 1 mg  1 mg Intravenous Q4H PRN Eusebio Robledo MD   1 mg at 08/10/23 0831    insulin aspart U-100 injection 0-5 Units  0-5 Units Subcutaneous Q6H PRN Eusebio Robledo MD        melatonin tablet 6 mg  6 mg Oral Nightly PRN Eusebio Robledo MD        morphine injection 2 mg  2 mg Intravenous Q4H PRN Eusebio Robledo MD        ondansetron injection 4 mg  4 mg Intravenous Q8H PRN Eusebio Robledo MD        pantoprazole EC tablet 40 mg  40 mg Oral Daily Radha Menchaca MD   40 mg at 08/10/23 0835    prochlorperazine injection Soln 5 mg  5 mg Intravenous Q6H PRN Eusebio Robledo MD        senna-docusate 8.6-50 mg per tablet 1 tablet  1 tablet Oral BID Eusebio Robledo MD   1 tablet at 08/10/23 0835    sodium chloride 0.9% flush 10 mL  10 mL  Intravenous WILL Robledo, Eusebio Sharma MD           No new subjective & objective note has been filed under this hospital service since the last note was generated.      Family History       Problem Relation (Age of Onset)    Breast cancer Mother    Diabetes Mother    Heart disease Mother    No Known Problems Father    Stroke Mother            Tobacco Use    Smoking status: Former     Current packs/day: 0.00     Average packs/day: 0.8 packs/day for 15.0 years (11.3 ttl pk-yrs)     Types: Cigarettes     Start date:      Quit date:      Years since quittin.6    Smokeless tobacco: Never   Substance and Sexual Activity    Alcohol use: Yes     Alcohol/week: 5.0 standard drinks of alcohol     Types: 5 Drinks containing 0.5 oz of alcohol per week     Comment: sociallly    Drug use: Never    Sexual activity: Yes       Physical Exam  Constitutional:       Appearance: Normal appearance.   HENT:      Head: Normocephalic and atraumatic.      Nose: Nose normal.      Mouth/Throat:      Mouth: Mucous membranes are moist.      Pharynx: Oropharynx is clear.   Eyes:      General: No scleral icterus.  Cardiovascular:      Rate and Rhythm: Normal rate and regular rhythm.   Pulmonary:      Effort: Pulmonary effort is normal.   Abdominal:      General: Abdomen is flat. Bowel sounds are normal.      Palpations: Abdomen is soft.      Tenderness: There is no abdominal tenderness.   Musculoskeletal:         General: Normal range of motion.      Cervical back: Normal range of motion and neck supple.   Skin:     General: Skin is warm and dry.   Neurological:      General: No focal deficit present.      Mental Status: She is alert and oriented to person, place, and time.   Psychiatric:         Mood and Affect: Mood normal.         Behavior: Behavior normal.         Significant Labs:  BMP:  Recent Labs   Lab 23  1839 08/10/23  0340    140   K 3.3* 4.1   CO2 26 25   BUN 14.0 16.0   CREATININE 0.94 1.17*   GLUCOSE 214* 163*    CALCIUM 9.6 8.3*       CBC:  Recent Labs   Lab 08/09/23  1839 08/10/23  0340   WBC 8.13 6.69   HGB 13.4 11.4*   HCT 39.5 35.1*    235       Recent Lab Results         08/10/23  0340   08/09/23  1929   08/09/23  1839        Albumin/Globulin Ratio 1.4     1.3       Albumin 4.1     5.0       Alkaline Phosphatase 59     72       ALT 62     86       Appearance, UA   SL CLOUDY         AST 37     56       Bacteria, UA   Rare         Baso # 0.04     0.04       Basophil % 0.6     0.5       BILIRUBIN TOTAL 0.5     0.6       Bilirubin, UA   Negative         BUN 16.0     14.0       Calcium 8.3     9.6       Chloride 103     100       CO2 25     26       Color, UA   Yellow         Creatinine 1.17     0.94       eGFR 53     >60       Eos # 0.02     0.04       Eosinophil % 0.3     0.5       Globulin, Total 2.9     3.9       Glucose 163     214       Glucose, UA   1+         Hematocrit 35.1     39.5       Hemoglobin 11.4     13.4       Immature Grans (Abs) 0.02     0.06       Immature Granulocytes 0.3     0.7       INR 1.0           Ketones, UA   1+         Leukocytes, UA   Negative         Lymph # 1.83     1.75       LYMPH % 27.4     21.5       MCH 32.1     32.2       MCHC 32.5     33.9       MCV 98.9     95.0       Mono # 0.82     0.54       Mono % 12.3     6.6       MPV 9.1     8.8       Neut # 3.96     5.70       Neut % 59.1     70.2       NITRITE UA   Negative         nRBC     0.0       Occult Blood UA   3+         pH, UA   6.5         Platelets 235     257       Potassium 4.1     3.3       PROTEIN TOTAL 7.0     8.9       Protein, UA   Trace         Protime 13.4           RBC 3.55     4.16       RBC, UA   >100         RDW 12.5     12.4       Sodium 140     140       Specific Gravity,UA   1.020         Squam Epithel, UA   Rare         Urobilinogen, UA   0.2         WBC, UA   3-5         WBC 6.69     8.13                [unfilled]       Assessment and Plan:   Patient with a right proximal ureteral calculus with  hydronephrosis nausea and vomiting plan we will strain her urine repeat her lab work in the a.m. and plan for ureteroscopic stone extraction with laser lithotripsy tomorrow afternoon if she is not pass the stone      No notes have been filed under this hospital service.  Service: Urology      VTE Risk Mitigation (From admission, onward)           Ordered     IP VTE HIGH RISK PATIENT  Once         08/10/23 0106     Place sequential compression device  Until discontinued         08/10/23 0106     Place MARY ALICE hose  Until discontinued         08/10/23 0106                    Thank you for your consult. I will follow-up with patient. Please contact us if you have any additional questions.    Isrrael Rachel MD  Urology  [facility]

## 2023-08-11 ENCOUNTER — ANESTHESIA (OUTPATIENT)
Dept: SURGERY | Facility: HOSPITAL | Age: 63
DRG: 661 | End: 2023-08-11
Payer: COMMERCIAL

## 2023-08-11 ENCOUNTER — ANESTHESIA EVENT (OUTPATIENT)
Dept: SURGERY | Facility: HOSPITAL | Age: 63
DRG: 661 | End: 2023-08-11
Payer: COMMERCIAL

## 2023-08-11 LAB
ALBUMIN SERPL-MCNC: 4 G/DL (ref 3.4–4.8)
ALBUMIN/GLOB SERPL: 1.3 RATIO (ref 1.1–2)
ALP SERPL-CCNC: 60 UNIT/L (ref 40–150)
ALT SERPL-CCNC: 52 UNIT/L (ref 0–55)
AST SERPL-CCNC: 32 UNIT/L (ref 5–34)
BASOPHILS # BLD AUTO: 0.04 X10(3)/MCL
BASOPHILS NFR BLD AUTO: 0.6 %
BILIRUB SERPL-MCNC: 0.5 MG/DL
BUN SERPL-MCNC: 14 MG/DL (ref 9.8–20.1)
CALCIUM SERPL-MCNC: 8.2 MG/DL (ref 8.4–10.2)
CHLORIDE SERPL-SCNC: 105 MMOL/L (ref 98–107)
CO2 SERPL-SCNC: 25 MMOL/L (ref 23–31)
CREAT SERPL-MCNC: 1.32 MG/DL (ref 0.55–1.02)
EOSINOPHIL # BLD AUTO: 0.07 X10(3)/MCL (ref 0–0.9)
EOSINOPHIL NFR BLD AUTO: 1 %
ERYTHROCYTE [DISTWIDTH] IN BLOOD BY AUTOMATED COUNT: 12.8 % (ref 11.5–17)
EST. AVERAGE GLUCOSE BLD GHB EST-MCNC: 142.7 MG/DL
GFR SERPLBLD CREATININE-BSD FMLA CKD-EPI: 46 MLS/MIN/1.73/M2
GLOBULIN SER-MCNC: 3.1 GM/DL (ref 2.4–3.5)
GLUCOSE SERPL-MCNC: 157 MG/DL (ref 82–115)
HBA1C MFR BLD: 6.6 %
HCT VFR BLD AUTO: 34.5 % (ref 37–47)
HGB BLD-MCNC: 11.1 G/DL (ref 12–16)
IMM GRANULOCYTES # BLD AUTO: 0.02 X10(3)/MCL (ref 0–0.04)
IMM GRANULOCYTES NFR BLD AUTO: 0.3 %
LYMPHOCYTES # BLD AUTO: 1.65 X10(3)/MCL (ref 0.6–4.6)
LYMPHOCYTES NFR BLD AUTO: 22.8 %
MCH RBC QN AUTO: 31.8 PG (ref 27–31)
MCHC RBC AUTO-ENTMCNC: 32.2 G/DL (ref 33–36)
MCV RBC AUTO: 98.9 FL (ref 80–94)
MONOCYTES # BLD AUTO: 0.81 X10(3)/MCL (ref 0.1–1.3)
MONOCYTES NFR BLD AUTO: 11.2 %
NEUTROPHILS # BLD AUTO: 4.64 X10(3)/MCL (ref 2.1–9.2)
NEUTROPHILS NFR BLD AUTO: 64.1 %
PLATELET # BLD AUTO: 246 X10(3)/MCL (ref 130–400)
PMV BLD AUTO: 9.1 FL (ref 7.4–10.4)
POCT GLUCOSE: 156 MG/DL (ref 70–110)
POCT GLUCOSE: 166 MG/DL (ref 70–110)
POCT GLUCOSE: 168 MG/DL (ref 70–110)
POCT GLUCOSE: 234 MG/DL (ref 70–110)
POTASSIUM SERPL-SCNC: 4 MMOL/L (ref 3.5–5.1)
PROT SERPL-MCNC: 7.1 GM/DL (ref 5.8–7.6)
RBC # BLD AUTO: 3.49 X10(6)/MCL (ref 4.2–5.4)
SODIUM SERPL-SCNC: 138 MMOL/L (ref 136–145)
WBC # SPEC AUTO: 7.23 X10(3)/MCL (ref 4.5–11.5)

## 2023-08-11 PROCEDURE — C1773 RET DEV, INSERTABLE: HCPCS | Performed by: UROLOGY

## 2023-08-11 PROCEDURE — 94761 N-INVAS EAR/PLS OXIMETRY MLT: CPT

## 2023-08-11 PROCEDURE — 99900035 HC TECH TIME PER 15 MIN (STAT)

## 2023-08-11 PROCEDURE — C1769 GUIDE WIRE: HCPCS | Performed by: UROLOGY

## 2023-08-11 PROCEDURE — 36000706: Performed by: UROLOGY

## 2023-08-11 PROCEDURE — 25000003 PHARM REV CODE 250: Performed by: INTERNAL MEDICINE

## 2023-08-11 PROCEDURE — 25000003 PHARM REV CODE 250: Performed by: NURSE ANESTHETIST, CERTIFIED REGISTERED

## 2023-08-11 PROCEDURE — 25000003 PHARM REV CODE 250: Performed by: UROLOGY

## 2023-08-11 PROCEDURE — D9220A PRA ANESTHESIA: ICD-10-PCS | Mod: ,,, | Performed by: NURSE ANESTHETIST, CERTIFIED REGISTERED

## 2023-08-11 PROCEDURE — C1758 CATHETER, URETERAL: HCPCS | Performed by: UROLOGY

## 2023-08-11 PROCEDURE — 85025 COMPLETE CBC W/AUTO DIFF WBC: CPT | Performed by: STUDENT IN AN ORGANIZED HEALTH CARE EDUCATION/TRAINING PROGRAM

## 2023-08-11 PROCEDURE — D9220A PRA ANESTHESIA: Mod: ,,, | Performed by: NURSE ANESTHETIST, CERTIFIED REGISTERED

## 2023-08-11 PROCEDURE — 63600175 PHARM REV CODE 636 W HCPCS: Performed by: INTERNAL MEDICINE

## 2023-08-11 PROCEDURE — 11000001 HC ACUTE MED/SURG PRIVATE ROOM

## 2023-08-11 PROCEDURE — 36000707: Performed by: UROLOGY

## 2023-08-11 PROCEDURE — 37000009 HC ANESTHESIA EA ADD 15 MINS: Performed by: UROLOGY

## 2023-08-11 PROCEDURE — 37000008 HC ANESTHESIA 1ST 15 MINUTES: Performed by: UROLOGY

## 2023-08-11 PROCEDURE — 83036 HEMOGLOBIN GLYCOSYLATED A1C: CPT | Performed by: STUDENT IN AN ORGANIZED HEALTH CARE EDUCATION/TRAINING PROGRAM

## 2023-08-11 PROCEDURE — C2617 STENT, NON-COR, TEM W/O DEL: HCPCS | Performed by: UROLOGY

## 2023-08-11 PROCEDURE — 63600175 PHARM REV CODE 636 W HCPCS: Performed by: NURSE ANESTHETIST, CERTIFIED REGISTERED

## 2023-08-11 PROCEDURE — 27201423 OPTIME MED/SURG SUP & DEVICES STERILE SUPPLY: Performed by: UROLOGY

## 2023-08-11 PROCEDURE — 80053 COMPREHEN METABOLIC PANEL: CPT | Performed by: STUDENT IN AN ORGANIZED HEALTH CARE EDUCATION/TRAINING PROGRAM

## 2023-08-11 PROCEDURE — 71000033 HC RECOVERY, INTIAL HOUR: Performed by: UROLOGY

## 2023-08-11 DEVICE — URETERAL STENT
Type: IMPLANTABLE DEVICE | Site: URETER | Status: FUNCTIONAL
Brand: PERCUFLEX™ PLUS

## 2023-08-11 RX ORDER — LIDOCAINE HYDROCHLORIDE 20 MG/ML
INJECTION, SOLUTION EPIDURAL; INFILTRATION; INTRACAUDAL; PERINEURAL
Status: DISCONTINUED | OUTPATIENT
Start: 2023-08-11 | End: 2023-08-11

## 2023-08-11 RX ORDER — SODIUM CHLORIDE 0.9 % (FLUSH) 0.9 %
10 SYRINGE (ML) INJECTION
Status: DISCONTINUED | OUTPATIENT
Start: 2023-08-11 | End: 2023-08-11

## 2023-08-11 RX ORDER — SODIUM CHLORIDE, SODIUM LACTATE, POTASSIUM CHLORIDE, CALCIUM CHLORIDE 600; 310; 30; 20 MG/100ML; MG/100ML; MG/100ML; MG/100ML
INJECTION, SOLUTION INTRAVENOUS CONTINUOUS
Status: CANCELLED | OUTPATIENT
Start: 2023-08-11

## 2023-08-11 RX ORDER — KETOROLAC TROMETHAMINE 10 MG/1
10 TABLET, FILM COATED ORAL EVERY 6 HOURS PRN
Status: DISCONTINUED | OUTPATIENT
Start: 2023-08-11 | End: 2023-08-12 | Stop reason: HOSPADM

## 2023-08-11 RX ORDER — DEXAMETHASONE SODIUM PHOSPHATE 4 MG/ML
INJECTION, SOLUTION INTRA-ARTICULAR; INTRALESIONAL; INTRAMUSCULAR; INTRAVENOUS; SOFT TISSUE
Status: DISCONTINUED | OUTPATIENT
Start: 2023-08-11 | End: 2023-08-11

## 2023-08-11 RX ORDER — CIPROFLOXACIN 500 MG/1
500 TABLET ORAL EVERY 12 HOURS
Status: DISCONTINUED | OUTPATIENT
Start: 2023-08-11 | End: 2023-08-12 | Stop reason: HOSPADM

## 2023-08-11 RX ORDER — PROPOFOL 10 MG/ML
VIAL (ML) INTRAVENOUS
Status: DISCONTINUED | OUTPATIENT
Start: 2023-08-11 | End: 2023-08-11

## 2023-08-11 RX ORDER — METOPROLOL TARTRATE 1 MG/ML
2.5 INJECTION, SOLUTION INTRAVENOUS EVERY 5 MIN PRN
Status: DISCONTINUED | OUTPATIENT
Start: 2023-08-11 | End: 2023-08-11

## 2023-08-11 RX ORDER — ONDANSETRON 2 MG/ML
INJECTION INTRAMUSCULAR; INTRAVENOUS
Status: DISCONTINUED | OUTPATIENT
Start: 2023-08-11 | End: 2023-08-11

## 2023-08-11 RX ORDER — FENTANYL CITRATE 50 UG/ML
25 INJECTION, SOLUTION INTRAMUSCULAR; INTRAVENOUS EVERY 5 MIN PRN
Status: DISCONTINUED | OUTPATIENT
Start: 2023-08-11 | End: 2023-08-11

## 2023-08-11 RX ADMIN — PROPOFOL 100 MG: 10 INJECTION, EMULSION INTRAVENOUS at 02:08

## 2023-08-11 RX ADMIN — HYDROMORPHONE HYDROCHLORIDE 1 MG: 2 INJECTION, SOLUTION INTRAMUSCULAR; INTRAVENOUS; SUBCUTANEOUS at 03:08

## 2023-08-11 RX ADMIN — SODIUM CHLORIDE: 9 INJECTION, SOLUTION INTRAVENOUS at 02:08

## 2023-08-11 RX ADMIN — KETOROLAC TROMETHAMINE 10 MG: 10 TABLET, FILM COATED ORAL at 04:08

## 2023-08-11 RX ADMIN — ATORVASTATIN CALCIUM 40 MG: 40 TABLET, FILM COATED ORAL at 07:08

## 2023-08-11 RX ADMIN — DEXAMETHASONE SODIUM PHOSPHATE 4 MG: 4 INJECTION, SOLUTION INTRA-ARTICULAR; INTRALESIONAL; INTRAMUSCULAR; INTRAVENOUS; SOFT TISSUE at 02:08

## 2023-08-11 RX ADMIN — METOPROLOL TARTRATE 2.5 MG: 1 INJECTION, SOLUTION INTRAVENOUS at 03:08

## 2023-08-11 RX ADMIN — SENNOSIDES AND DOCUSATE SODIUM 1 TABLET: 50; 8.6 TABLET ORAL at 08:08

## 2023-08-11 RX ADMIN — LIDOCAINE HYDROCHLORIDE 60 MG: 20 INJECTION, SOLUTION EPIDURAL; INFILTRATION; INTRACAUDAL; PERINEURAL at 02:08

## 2023-08-11 RX ADMIN — CIPROFLOXACIN 400 MG: 400 INJECTION, SOLUTION INTRAVENOUS at 03:08

## 2023-08-11 RX ADMIN — SENNOSIDES AND DOCUSATE SODIUM 1 TABLET: 50; 8.6 TABLET ORAL at 07:08

## 2023-08-11 RX ADMIN — MORPHINE SULFATE 2 MG: 4 INJECTION INTRAVENOUS at 01:08

## 2023-08-11 RX ADMIN — HYDRALAZINE HYDROCHLORIDE 10 MG: 20 INJECTION INTRAMUSCULAR; INTRAVENOUS at 11:08

## 2023-08-11 RX ADMIN — ONDANSETRON 4 MG: 2 INJECTION INTRAMUSCULAR; INTRAVENOUS at 02:08

## 2023-08-11 RX ADMIN — AMLODIPINE BESYLATE 5 MG: 5 TABLET ORAL at 07:08

## 2023-08-11 RX ADMIN — HYDRALAZINE HYDROCHLORIDE 10 MG: 20 INJECTION INTRAMUSCULAR; INTRAVENOUS at 04:08

## 2023-08-11 RX ADMIN — HYDRALAZINE HYDROCHLORIDE 10 MG: 20 INJECTION INTRAMUSCULAR; INTRAVENOUS at 03:08

## 2023-08-11 RX ADMIN — PANTOPRAZOLE SODIUM 40 MG: 40 TABLET, DELAYED RELEASE ORAL at 07:08

## 2023-08-11 RX ADMIN — INSULIN ASPART 2 UNITS: 100 INJECTION, SOLUTION INTRAVENOUS; SUBCUTANEOUS at 06:08

## 2023-08-11 RX ADMIN — HYDROMORPHONE HYDROCHLORIDE 1 MG: 2 INJECTION, SOLUTION INTRAMUSCULAR; INTRAVENOUS; SUBCUTANEOUS at 07:08

## 2023-08-11 RX ADMIN — ONDANSETRON 4 MG: 2 INJECTION INTRAMUSCULAR; INTRAVENOUS at 12:08

## 2023-08-11 RX ADMIN — CIPROFLOXACIN HYDROCHLORIDE 500 MG: 500 TABLET, FILM COATED ORAL at 08:08

## 2023-08-11 RX ADMIN — MORPHINE SULFATE 2 MG: 4 INJECTION INTRAVENOUS at 09:08

## 2023-08-11 RX ADMIN — HYDROMORPHONE HYDROCHLORIDE 1 MG: 2 INJECTION, SOLUTION INTRAMUSCULAR; INTRAVENOUS; SUBCUTANEOUS at 12:08

## 2023-08-11 NOTE — OP NOTE
[facility]  Medical Surgical Unit  Operative Note      Date of Procedure: 8/11/23    Procedure: right ureteroscopic stone extraction c laser lithotripsy and indwelling stent  Surgeon(s) and Role:     * Isrrael Rachel MD - Primary    Assisting Surgeon: none  Pre-Operative Diagnosis: right ureteral calculus c hydronephrosis, right flank pain ,N/V    Post-Operative Diagnosis: Post-Op Diagnosis Codes:same       Anesthesia: general  Operative Findings (including complications, if any):  right ureteral calculus  urethral stenosis    Description of Technical Procedures:  After operative consent patient brought to the operating room placed on table in the supine position general anesthesia induced patient converted to dorsal lithotomy position genital area prepped and draped in usual sterile fashion the 22 [place] cystoscope was introduced per urethra into the bladder she had a urethral stenosis required dilation bladder was then visualized there were no mucosal lesions or foreign bodies she had diffuse mild trabeculation ureteral orifices normal size shape and position bilaterally this time a right retrograde pyelogram performed which revealed a stone located right the transverse process just below the renal pelvis little to no contrast could get past the stone to manipulate a wire up the ureter and into the renal pelvis this time ureteral catheter was removed cystoscope was removed rigid ureteroscope was introduced per urethra into the bladder and with the aid of a working wire through the scope we were able to manipulate this all the way up to the stone which was in the very proximal ureter working wire was then removed laser fiber was then used to fragment the stone into multiple pieces fragments were then removed with multiple passes the ureteroscope at the completion the scope was advanced all the way up into the renal pelvis no further stones were identified no evidence of any injury the ureter she  "would have a lot of inflammation at the site of the stone and a narrowing of the ureter this spot this time ureteroscope was removed cystoscope was reinserted in the bladder right retrograde pyelogram performed which revealed no extravasation at this time a 22 cm 6 [place] JJ stent was guided over the guidewire curled in the renal pelvis and bladder confirmed fluoroscopically and cystoscopically the stent was left on a suture bladder was then drained cystoscope was removed suture was taped to the patient's abdomen bimanual examination was then performed which revealed an intact cervix no pelvic abnormalities rectal examination likewise unremarkable and the patient was brought to recovery room in stable condition she had no blood loss she seemed to tolerate procedure well we will observe her tonight if she is doing well she may be discharged in the a.m. and I will follow up in the office in 1 week    Significant Surgical Tasks Conducted by the Assistant(s), if Applicable: none    Estimated Blood Loss (EBL): * No values recorded between 5/3/2023 12:00 AM and 5/3/2023  2:57 PM *           Implants: * No implants in log *22 cm 6 [place] Islander JJ stent    Specimens: ureteral calculus  Specimen (24h ago, onward)      Ureteral calculus                    Condition: stable    Disposition: return to room after recovery    Attestation:     Discharge Note    OUTCOME:     DISPOSITION: "}    FINAL DIAGNOSIS:  FOLLOWUP:   DISCHARGE INSTRUCTIONS:     "

## 2023-08-11 NOTE — TRANSFER OF CARE
Anesthesia Transfer of Care Note    Patient: Nehal Araujo    Procedure(s) Performed: Procedure(s) (LRB):  CYSTOURETEROSCOPY,WITH HOLMIUM LASER LITHOTRIPSY OF URETERAL CALCULUS (N/A)  INSERTION, STENT, URETER (Right)    Patient location: PACU    Anesthesia Type: general    Transport from OR: Transported from OR on room air with adequate spontaneous ventilation    Post pain: adequate analgesia    Post assessment: no apparent anesthetic complications    Post vital signs: stable    Level of consciousness: sedated    Nausea/Vomiting: no nausea/vomiting    Complications: none    Transfer of care protocol was followed      Last vitals:

## 2023-08-11 NOTE — SUBJECTIVE & OBJECTIVE
Review of Systems   All other systems reviewed and are negative.    Objective:     Vital Signs (Most Recent):  Temp: 99.2 °F (37.3 °C) (08/11/23 0733)  Pulse: 100 (08/11/23 0733)  Resp: 20 (08/11/23 0914)  BP: (!) 174/86 (08/11/23 0733)  SpO2: (!) 91 % (08/11/23 0733) Vital Signs (24h Range):  Temp:  [98.5 °F (36.9 °C)-99.3 °F (37.4 °C)] 99.2 °F (37.3 °C)  Pulse:  [] 100  Resp:  [17-20] 20  SpO2:  [91 %-94 %] 91 %  BP: (150-194)/(72-89) 174/86     Weight: 79.4 kg (175 lb)  Body mass index is 31 kg/m².    Intake/Output Summary (Last 24 hours) at 8/11/2023 1026  Last data filed at 8/11/2023 0635  Gross per 24 hour   Intake 2961.86 ml   Output --   Net 2961.86 ml         Physical Exam    CONSTITUTIONAL: vitals as noted, alert, awake, no distress  HEENT: No scleral icterus, oropharynx clear  RESPIRATORY: clear to auscultation  CVS: regular rate and rhythm  GASTROINTESTINAL: soft, non-tender, non-distended  : no tenderness but received dilaudid in ER  NEURO: grossly normal exam, moves all extremities  HEM/LYMPH: no lymphadenopathy  PSYCH: alert and oriented x 3, normal affect  SKIN: no rashes noted     Significant Labs: All pertinent labs within the past 24 hours have been reviewed.    Significant Imaging: I have reviewed all pertinent imaging results/findings within the past 24 hours.

## 2023-08-11 NOTE — HOSPITAL COURSE
8/11: pt seen and examined at bedside, no new complaints today. Did not pass any stones overnight, wall  go to OR at noon.      8/12: pt taken to the OR on 8/11 for right ureteroscopic stone extraction c laser lithotripsy and indwelling stent. She was monitored overnight and was feeling better in the am. On 8/12 pt was imani free, she was feeling much better and stated that she was ready to go home. She was cleared for discharge by urology to follow up with them in 1 week.

## 2023-08-11 NOTE — ANESTHESIA PREPROCEDURE EVALUATION
08/11/2023  Nehal Araujo is a 62 y.o., female.      Pre-op Assessment    I have reviewed the Patient Summary Reports.     I have reviewed the Nursing Notes. I have reviewed the NPO Status.   I have reviewed the Medications.     Review of Systems  Anesthesia Hx:  Denies Family Hx of Anesthesia complications.   Denies Personal Hx of Anesthesia complications.   Social:  Alcohol Use, Smoker    Hematology/Oncology:  Hematology Normal   Oncology Normal     EENT/Dental:EENT/Dental Normal   Cardiovascular:   Hypertension, well controlled    Pulmonary:  Pulmonary Normal    Renal/:  Renal/ Normal     Hepatic/GI:  Hepatic/GI Normal    Musculoskeletal:   Arthritis     Neurological:   Neuromuscular Disease,    Endocrine:   Diabetes, well controlled, type 2    Dermatological:  Skin Normal    Psych:  Psychiatric Normal           Physical Exam  General: Cooperative, Alert and Oriented    Airway:  Mallampati: II   Mouth Opening: Normal  TM Distance: Normal  Tongue: Normal  Neck ROM: Normal ROM    Dental:  Intact        Anesthesia Plan  Type of Anesthesia, risks & benefits discussed:    Anesthesia Type: Gen Natural Airway  Intra-op Monitoring Plan: Standard ASA Monitors  Post Op Pain Control Plan: multimodal analgesia  Induction:  IV  Airway Plan: Direct  Informed Consent: Informed consent signed with the Patient and all parties understand the risks and agree with anesthesia plan.  All questions answered. Patient consented to blood products? Yes  ASA Score: 3    Ready For Surgery From Anesthesia Perspective.     .

## 2023-08-11 NOTE — ASSESSMENT & PLAN NOTE
- NPO, pain control including IV dilaudid  - IV fluids  - urology consult: will go to OR at noon  - empiric antibiotics started with Cipro

## 2023-08-11 NOTE — PROGRESS NOTES
[facility] - [facility] Surgical Unit  Hospital Medicine  Progress Note    Patient Name: Nehal Araujo  MRN: [id number]  Patient Class: IP- Inpatient   Admission Date: 8/9/2023  Length of Stay: 2 days  Attending Physician: Evaristo Benoit MD  Primary Care Provider: Angela Baez MD        Subjective:     Principal Problem:Obstructive uropathy        HPI:  Chief complaint: R flank pain    History given by: patient    HPI: 66 year old F patient with PMH of HTN, DM, HLD presented to [facility] ER with right flank pain and hematuria. She describes pain as 10/10 in intensity, colicky and radiating to right lower abdomen. She has had the pain on and off for 2 weeks but got worse yesterday. She reports nausea and vomiting. Reports hematuria and low grade fever. She has a history of kidney stones that passed with flomax and hydration.     I personally spoke with ED clinician regarding the case and reviewed their notes. I personally reviewed all imaging and lab findings as well as any prior medical records that were available within the chart prior to admission. Workup in the ER showed right obstructive uropathy caused by proximal ureteral calculus. She was admitted for further management.       Overview/Hospital Course:  8/11: pt seen and examined at bedside, no new complaints today. Did not pass any stones overnight, wall  go to OR at noon.        Review of Systems   All other systems reviewed and are negative.    Objective:     Vital Signs (Most Recent):  Temp: 99.2 °F (37.3 °C) (08/11/23 0733)  Pulse: 100 (08/11/23 0733)  Resp: 20 (08/11/23 0914)  BP: (!) 174/86 (08/11/23 0733)  SpO2: (!) 91 % (08/11/23 0733) Vital Signs (24h Range):  Temp:  [98.5 °F (36.9 °C)-99.3 °F (37.4 °C)] 99.2 °F (37.3 °C)  Pulse:  [] 100  Resp:  [17-20] 20  SpO2:  [91 %-94 %] 91 %  BP: (150-194)/(72-89) 174/86     Weight: 79.4 kg (175 lb)  Body mass index is 31 kg/m².    Intake/Output Summary (Last 24 hours) at 8/11/2023 1026  Last data  filed at 8/11/2023 0635  Gross per 24 hour   Intake 2961.86 ml   Output --   Net 2961.86 ml         Physical Exam    CONSTITUTIONAL: vitals as noted, alert, awake, no distress  HEENT: No scleral icterus, oropharynx clear  RESPIRATORY: clear to auscultation  CVS: regular rate and rhythm  GASTROINTESTINAL: soft, non-tender, non-distended  : no tenderness but received dilaudid in ER  NEURO: grossly normal exam, moves all extremities  HEM/LYMPH: no lymphadenopathy  PSYCH: alert and oriented x 3, normal affect  SKIN: no rashes noted     Significant Labs: All pertinent labs within the past 24 hours have been reviewed.    Significant Imaging: I have reviewed all pertinent imaging results/findings within the past 24 hours.      Assessment/Plan:      * Obstructive uropathy  - NPO, pain control including IV dilaudid  - IV fluids  - urology consult: will go to OR at noon  - empiric antibiotics started with Cipro    T2DM (type 2 diabetes mellitus)  -SSI and Accu-Cheks  -f.u A1C    HTN (hypertension)  - home meds, prn IV hydralazine    Ureterolithiasis  - as above  - UA shows hematuria    Mixed hyperlipidemia  - home meds      VTE Risk Mitigation (From admission, onward)         Ordered     IP VTE HIGH RISK PATIENT  Once         08/10/23 0106     Place sequential compression device  Until discontinued         08/10/23 0106     Place MARY ALICE hose  Until discontinued         08/10/23 0106                Discharge Planning   ALENA:      Code Status: Full Code   Is the patient medically ready for discharge?:     Reason for patient still in hospital (select all that apply): Treatment  Discharge Plan A: Home with family                  Evaristo Benoit MD  Department of Hospital Medicine   [facility] - [facility]

## 2023-08-11 NOTE — ANESTHESIA POSTPROCEDURE EVALUATION
Anesthesia Post Evaluation    Patient: Nehal Araujo    Procedure(s) Performed: Procedure(s) (LRB):  CYSTOURETEROSCOPY,WITH HOLMIUM LASER LITHOTRIPSY OF URETERAL CALCULUS (N/A)  INSERTION, STENT, URETER (Right)    Final Anesthesia Type: general      Patient location during evaluation: PACU  Patient participation: Yes- Able to Participate  Level of consciousness: awake and alert and oriented  Post-procedure vital signs: reviewed and stable  Pain management: adequate  Airway patency: patent    PONV status at discharge: No PONV  Anesthetic complications: no      Cardiovascular status: stable  Respiratory status: unassisted, spontaneous ventilation and room air  Hydration status: euvolemic  Follow-up not needed.          Vitals Value Taken Time   BP  08/11/23 1517   Temp  08/11/23 1519   Pulse 127 08/11/23 1518   Resp 12 08/11/23 1518   SpO2 91 % 08/11/23 1518   Vitals shown include unvalidated device data.      No case tracking events are documented in the log.      Pain/Matilda Score: Pain Rating Prior to Med Admin: 10 (8/11/2023 12:04 PM)  Pain Rating Post Med Admin: 4 (8/11/2023  9:44 AM)

## 2023-08-12 VITALS
SYSTOLIC BLOOD PRESSURE: 158 MMHG | RESPIRATION RATE: 18 BRPM | WEIGHT: 175 LBS | HEIGHT: 63 IN | TEMPERATURE: 98 F | HEART RATE: 87 BPM | DIASTOLIC BLOOD PRESSURE: 84 MMHG | OXYGEN SATURATION: 95 % | BODY MASS INDEX: 31.01 KG/M2

## 2023-08-12 LAB
ALBUMIN SERPL-MCNC: 3.7 G/DL (ref 3.4–4.8)
ALBUMIN/GLOB SERPL: 1.1 RATIO (ref 1.1–2)
ALP SERPL-CCNC: 58 UNIT/L (ref 40–150)
ALT SERPL-CCNC: 42 UNIT/L (ref 0–55)
AST SERPL-CCNC: 30 UNIT/L (ref 5–34)
BASOPHILS # BLD AUTO: 0.02 X10(3)/MCL
BASOPHILS NFR BLD AUTO: 0.4 %
BILIRUB SERPL-MCNC: 0.4 MG/DL
BUN SERPL-MCNC: 12 MG/DL (ref 9.8–20.1)
CALCIUM SERPL-MCNC: 8.6 MG/DL (ref 8.4–10.2)
CHLORIDE SERPL-SCNC: 106 MMOL/L (ref 98–107)
CO2 SERPL-SCNC: 26 MMOL/L (ref 23–31)
CREAT SERPL-MCNC: 0.83 MG/DL (ref 0.55–1.02)
EOSINOPHIL # BLD AUTO: 0.01 X10(3)/MCL (ref 0–0.9)
EOSINOPHIL NFR BLD AUTO: 0.2 %
ERYTHROCYTE [DISTWIDTH] IN BLOOD BY AUTOMATED COUNT: 13.1 % (ref 11.5–17)
GFR SERPLBLD CREATININE-BSD FMLA CKD-EPI: >60 MLS/MIN/1.73/M2
GLOBULIN SER-MCNC: 3.3 GM/DL (ref 2.4–3.5)
GLUCOSE SERPL-MCNC: 158 MG/DL (ref 82–115)
HCT VFR BLD AUTO: 32.5 % (ref 37–47)
HGB BLD-MCNC: 10.7 G/DL (ref 12–16)
IMM GRANULOCYTES # BLD AUTO: 0.04 X10(3)/MCL (ref 0–0.04)
IMM GRANULOCYTES NFR BLD AUTO: 0.7 %
LYMPHOCYTES # BLD AUTO: 0.96 X10(3)/MCL (ref 0.6–4.6)
LYMPHOCYTES NFR BLD AUTO: 17.3 %
MCH RBC QN AUTO: 32.3 PG (ref 27–31)
MCHC RBC AUTO-ENTMCNC: 32.9 G/DL (ref 33–36)
MCV RBC AUTO: 98.2 FL (ref 80–94)
MONOCYTES # BLD AUTO: 0.58 X10(3)/MCL (ref 0.1–1.3)
MONOCYTES NFR BLD AUTO: 10.4 %
NEUTROPHILS # BLD AUTO: 3.95 X10(3)/MCL (ref 2.1–9.2)
NEUTROPHILS NFR BLD AUTO: 71 %
PLATELET # BLD AUTO: 239 X10(3)/MCL (ref 130–400)
PMV BLD AUTO: 8.9 FL (ref 7.4–10.4)
POCT GLUCOSE: 141 MG/DL (ref 70–110)
POTASSIUM SERPL-SCNC: 3.8 MMOL/L (ref 3.5–5.1)
PROT SERPL-MCNC: 7 GM/DL (ref 5.8–7.6)
RBC # BLD AUTO: 3.31 X10(6)/MCL (ref 4.2–5.4)
SODIUM SERPL-SCNC: 145 MMOL/L (ref 136–145)
WBC # SPEC AUTO: 5.56 X10(3)/MCL (ref 4.5–11.5)

## 2023-08-12 PROCEDURE — 25000003 PHARM REV CODE 250: Performed by: INTERNAL MEDICINE

## 2023-08-12 PROCEDURE — 94761 N-INVAS EAR/PLS OXIMETRY MLT: CPT

## 2023-08-12 PROCEDURE — 25000003 PHARM REV CODE 250: Performed by: UROLOGY

## 2023-08-12 PROCEDURE — 80053 COMPREHEN METABOLIC PANEL: CPT | Performed by: STUDENT IN AN ORGANIZED HEALTH CARE EDUCATION/TRAINING PROGRAM

## 2023-08-12 PROCEDURE — 85025 COMPLETE CBC W/AUTO DIFF WBC: CPT | Performed by: STUDENT IN AN ORGANIZED HEALTH CARE EDUCATION/TRAINING PROGRAM

## 2023-08-12 RX ORDER — KETOROLAC TROMETHAMINE 10 MG/1
10 TABLET, FILM COATED ORAL EVERY 6 HOURS PRN
Qty: 20 TABLET | Refills: 0 | Status: SHIPPED | OUTPATIENT
Start: 2023-08-12 | End: 2023-08-17

## 2023-08-12 RX ADMIN — ATORVASTATIN CALCIUM 40 MG: 40 TABLET, FILM COATED ORAL at 09:08

## 2023-08-12 RX ADMIN — SENNOSIDES AND DOCUSATE SODIUM 1 TABLET: 50; 8.6 TABLET ORAL at 09:08

## 2023-08-12 RX ADMIN — PANTOPRAZOLE SODIUM 40 MG: 40 TABLET, DELAYED RELEASE ORAL at 09:08

## 2023-08-12 RX ADMIN — CIPROFLOXACIN HYDROCHLORIDE 500 MG: 500 TABLET, FILM COATED ORAL at 09:08

## 2023-08-12 RX ADMIN — SODIUM CHLORIDE: 9 INJECTION, SOLUTION INTRAVENOUS at 03:08

## 2023-08-12 RX ADMIN — AMLODIPINE BESYLATE 5 MG: 5 TABLET ORAL at 09:08

## 2023-08-12 NOTE — PLAN OF CARE
Problem: Adult Inpatient Plan of Care  Goal: Plan of Care Review  Outcome: Met  Goal: Patient-Specific Goal (Individualized)  Outcome: Met  Goal: Absence of Hospital-Acquired Illness or Injury  Outcome: Met  Goal: Optimal Comfort and Wellbeing  Outcome: Met  Goal: Readiness for Transition of Care  Outcome: Met     Problem: Diabetes Comorbidity  Goal: Blood Glucose Level Within Targeted Range  Outcome: Met     Problem: Pain Acute  Goal: Acceptable Pain Control and Functional Ability  Outcome: Met

## 2023-08-12 NOTE — ASSESSMENT & PLAN NOTE
- s/p : right ureteroscopic stone extraction c laser lithotripsy and indwelling stent  -- discharge home with prescription of toradol  -- f.u with urology In 1 week

## 2023-08-12 NOTE — DISCHARGE SUMMARY
[facility]  Hospital Medicine  Discharge Summary      Patient Name: Nehal Araujo  MRN: [id number]  JOSELINE: [id number]  Patient Class: IP- Inpatient  Admission Date: 8/9/2023  Hospital Length of Stay: 3 days  Discharge Date and Time:  08/12/2023 9:50 AM  Attending Physician: Evaristo Benoit MD   Discharging Provider: Evaristo Benoit MD  Primary Care Provider: Angela Baez MD    Primary Care Team: Networked reference to record PCT     HPI:   Chief complaint: R flank pain    History given by: patient    HPI: 66 year old F patient with PMH of HTN, DM, HLD presented to [facility] ER with right flank pain and hematuria. She describes pain as 10/10 in intensity, colicky and radiating to right lower abdomen. She has had the pain on and off for 2 weeks but got worse yesterday. She reports nausea and vomiting. Reports hematuria and low grade fever. She has a history of kidney stones that passed with flomax and hydration.     I personally spoke with ED clinician regarding the case and reviewed their notes. I personally reviewed all imaging and lab findings as well as any prior medical records that were available within the chart prior to admission. Workup in the ER showed right obstructive uropathy caused by proximal ureteral calculus. She was admitted for further management.       Procedure(s) (LRB):  CYSTOURETEROSCOPY,WITH HOLMIUM LASER LITHOTRIPSY OF URETERAL CALCULUS (N/A)  INSERTION, STENT, URETER (Right)      Hospital Course:   8/11: pt seen and examined at bedside, no new complaints today. Did not pass any stones overnight, wall  go to OR at noon.      8/12: pt taken to the OR on 8/11 for right ureteroscopic stone extraction c laser lithotripsy and indwelling stent. She was monitored overnight and was feeling better in the am. On 8/12 pt was imani free, she was feeling much better and stated that she was ready to go home. She was cleared for discharge by urology to follow up with them in 1  week.        Goals of Care Treatment Preferences:  Code Status: Full Code      Consults:   Consults (From admission, onward)        Status Ordering Provider     Inpatient consult to Urology  Once        Provider:  Isrrael Rachel MD    Acknowledged SHODNA SCHROEDER          Cardiac/Vascular  HTN (hypertension)  - home meds, prn IV hydralazine    Mixed hyperlipidemia  - home meds    Renal/  Obstructive uropathy  - s/p : right ureteroscopic stone extraction c laser lithotripsy and indwelling stent  -- discharge home with prescription of toradol  -- f.u with urology In 1 week    Endocrine  T2DM (type 2 diabetes mellitus)  -SSI and Accu-Cheks  -A1C 6.6      Final Active Diagnoses:    Diagnosis Date Noted POA    PRINCIPAL PROBLEM:  Ureterolithiasis [N20.1] 08/10/2023 Yes    Obstructive uropathy [N13.9] 08/10/2023 Yes    HTN (hypertension) [I10] 08/10/2023 Yes    T2DM (type 2 diabetes mellitus) [E11.9] 08/10/2023 Yes    Mixed hyperlipidemia [E78.2] 12/08/2022 Yes      Problems Resolved During this Admission:       Discharged Condition: good    Disposition: Home or Self Care    Follow Up:   Follow-up Information     Angela Baez MD Follow up in 2 week(s).    Specialty: Family Medicine  Contact information:  [address] [place] [address] [phone]             Isrrael Rachel MD Follow up in 1 week(s).    Specialty: Urology  Contact information:  [facility] [place] [address] [phone]                       Patient Instructions:   No discharge procedures on file.    Significant Diagnostic Studies: Labs:   CMP   Recent Labs   Lab 08/11/23  0253 08/12/23  0242    145   K 4.0 3.8   CO2 25 26   BUN 14.0 12.0   CREATININE 1.32* 0.83   CALCIUM 8.2* 8.6   ALBUMIN 4.0 3.7   BILITOT 0.5 0.4   ALKPHOS 60 58   AST 32 30   ALT 52 42   , CBC   Recent Labs   Lab 08/11/23  0253 08/12/23  0242   WBC 7.23 5.56   HGB 11.1* 10.7*   HCT 34.5* 32.5*    239    and All labs within the  "past 24 hours have been reviewed  Microbiology: Blood Culture No results found for: "LABBLOO" and Sputum Culture No results found for: "GSRESP", "RESPIRATORYC"  Radiology: X-Ray: CXR: X-Ray Chest 1 View (CXR): No results found for this visit on 08/09/23.  CT scan: CT ABDOMEN PELVIS WITH CONTRAST: No results found for this visit on 08/09/23.    Pending Diagnostic Studies:     Procedure Component Value Units Date/Time    Specimen to Pathology [[id number]] Collected: 08/11/23 1606    Order Status: Sent Lab Status: No result     Specimen: Stone from Kidney, Right          Medications:  Reconciled Home Medications:      Medication List      START taking these medications    ketorolac 10 mg tablet  Commonly known as: TORADOL  Take 1 tablet (10 mg total) by mouth every 6 (six) hours as needed for Pain.        CONTINUE taking these medications    amLODIPine 5 MG tablet  Commonly known as: NORVASC  Take 1 tablet (5 mg total) by mouth once daily.     atorvastatin 40 MG tablet  Commonly known as: LIPITOR  Take 1 tablet (40 mg total) by mouth once daily.     blood sugar diagnostic Strp  To check BG 1 times daily, to use with insurance preferred meter     blood-glucose meter kit  To check BG 1 times daily, to use with insurance preferred meter     icosapent ethyL 1 gram Cap  Commonly known as: VASCEPA  Take 2 capsules (2 g total) by mouth 2 (two) times daily.     lancets Misc  To check BG 1 times daily, to use with insurance preferred meter     meloxicam 15 MG tablet  Commonly known as: MOBIC  Take 15 mg by mouth.     omeprazole 20 MG capsule  Commonly known as: PRILOSEC  Take 1 capsule (20 mg total) by mouth once daily.            Indwelling Lines/Drains at time of discharge:   Lines/Drains/Airways     Drain  Duration                Ureteral Drain/Stent 08/11/23 1504 Right ureter 22 Fr. <1 day                Time spent on the discharge of patient: 35 minutes         Evaristo Benoit MD  Department of Hospital Medicine  [facility]

## 2023-08-13 ENCOUNTER — PATIENT MESSAGE (OUTPATIENT)
Dept: ADMINISTRATIVE | Facility: OTHER | Age: 63
End: 2023-08-13
Payer: COMMERCIAL

## 2023-08-14 ENCOUNTER — PATIENT MESSAGE (OUTPATIENT)
Dept: ADMINISTRATIVE | Facility: OTHER | Age: 63
End: 2023-08-14
Payer: COMMERCIAL

## 2023-08-14 ENCOUNTER — TELEPHONE (OUTPATIENT)
Dept: ADMINISTRATIVE | Facility: CLINIC | Age: 63
End: 2023-08-14
Payer: COMMERCIAL

## 2023-08-14 ENCOUNTER — PATIENT OUTREACH (OUTPATIENT)
Dept: ADMINISTRATIVE | Facility: CLINIC | Age: 63
End: 2023-08-14
Payer: COMMERCIAL

## 2023-08-14 ENCOUNTER — APPOINTMENT (OUTPATIENT)
Dept: LAB | Facility: HOSPITAL | Age: 63
End: 2023-08-14
Attending: FAMILY MEDICINE
Payer: COMMERCIAL

## 2023-08-14 ENCOUNTER — PATIENT MESSAGE (OUTPATIENT)
Dept: ADMINISTRATIVE | Facility: CLINIC | Age: 63
End: 2023-08-14
Payer: COMMERCIAL

## 2023-08-14 ENCOUNTER — OFFICE VISIT (OUTPATIENT)
Dept: FAMILY MEDICINE | Facility: CLINIC | Age: 63
End: 2023-08-14
Payer: COMMERCIAL

## 2023-08-14 ENCOUNTER — PATIENT OUTREACH (OUTPATIENT)
Dept: OTHER | Facility: OTHER | Age: 63
End: 2023-08-14
Payer: COMMERCIAL

## 2023-08-14 VITALS
BODY MASS INDEX: 30.94 KG/M2 | DIASTOLIC BLOOD PRESSURE: 90 MMHG | SYSTOLIC BLOOD PRESSURE: 138 MMHG | HEART RATE: 81 BPM | HEIGHT: 63 IN | TEMPERATURE: 100 F | WEIGHT: 174.63 LBS | OXYGEN SATURATION: 98 %

## 2023-08-14 DIAGNOSIS — R73.03 PRE-DIABETES: ICD-10-CM

## 2023-08-14 DIAGNOSIS — R30.0 DYSURIA: ICD-10-CM

## 2023-08-14 DIAGNOSIS — R22.1 LOCALIZED SWELLING, MASS AND LUMP, NECK: ICD-10-CM

## 2023-08-14 DIAGNOSIS — J30.2 SEASONAL ALLERGIES: ICD-10-CM

## 2023-08-14 DIAGNOSIS — I10 ESSENTIAL HYPERTENSION: ICD-10-CM

## 2023-08-14 DIAGNOSIS — Z09 HOSPITAL DISCHARGE FOLLOW-UP: Primary | ICD-10-CM

## 2023-08-14 LAB
APPEARANCE UR: ABNORMAL
BACTERIA #/AREA URNS AUTO: ABNORMAL /HPF
BILIRUB UR QL STRIP.AUTO: ABNORMAL
COLOR UR: ABNORMAL
GLUCOSE UR QL STRIP.AUTO: NEGATIVE
KETONES UR QL STRIP.AUTO: NEGATIVE
LEUKOCYTE ESTERASE UR QL STRIP.AUTO: ABNORMAL
NITRITE UR QL STRIP.AUTO: NEGATIVE
PH UR STRIP.AUTO: 7 [PH]
PROT UR QL STRIP.AUTO: ABNORMAL
RBC #/AREA URNS AUTO: >100 /HPF
RBC UR QL AUTO: ABNORMAL
SP GR UR STRIP.AUTO: 1.01 (ref 1–1.03)
SQUAMOUS #/AREA URNS AUTO: ABNORMAL /HPF
UROBILINOGEN UR STRIP-ACNC: 0.2
WBC #/AREA URNS AUTO: ABNORMAL /HPF

## 2023-08-14 PROCEDURE — 99214 PR OFFICE/OUTPT VISIT, EST, LEVL IV, 30-39 MIN: ICD-10-PCS | Mod: ,,, | Performed by: FAMILY MEDICINE

## 2023-08-14 PROCEDURE — 3075F SYST BP GE 130 - 139MM HG: CPT | Mod: CPTII,,, | Performed by: FAMILY MEDICINE

## 2023-08-14 PROCEDURE — 3008F BODY MASS INDEX DOCD: CPT | Mod: CPTII,,, | Performed by: FAMILY MEDICINE

## 2023-08-14 PROCEDURE — 4010F ACE/ARB THERAPY RXD/TAKEN: CPT | Mod: CPTII,,, | Performed by: FAMILY MEDICINE

## 2023-08-14 PROCEDURE — 3080F DIAST BP >= 90 MM HG: CPT | Mod: CPTII,,, | Performed by: FAMILY MEDICINE

## 2023-08-14 PROCEDURE — 4010F PR ACE/ARB THEARPY RXD/TAKEN: ICD-10-PCS | Mod: CPTII,,, | Performed by: FAMILY MEDICINE

## 2023-08-14 PROCEDURE — 3008F PR BODY MASS INDEX (BMI) DOCUMENTED: ICD-10-PCS | Mod: CPTII,,, | Performed by: FAMILY MEDICINE

## 2023-08-14 PROCEDURE — 3044F HG A1C LEVEL LT 7.0%: CPT | Mod: CPTII,,, | Performed by: FAMILY MEDICINE

## 2023-08-14 PROCEDURE — 3075F PR MOST RECENT SYSTOLIC BLOOD PRESS GE 130-139MM HG: ICD-10-PCS | Mod: CPTII,,, | Performed by: FAMILY MEDICINE

## 2023-08-14 PROCEDURE — 99214 OFFICE O/P EST MOD 30 MIN: CPT | Mod: ,,, | Performed by: FAMILY MEDICINE

## 2023-08-14 PROCEDURE — 3080F PR MOST RECENT DIASTOLIC BLOOD PRESSURE >= 90 MM HG: ICD-10-PCS | Mod: CPTII,,, | Performed by: FAMILY MEDICINE

## 2023-08-14 PROCEDURE — 3044F PR MOST RECENT HEMOGLOBIN A1C LEVEL <7.0%: ICD-10-PCS | Mod: CPTII,,, | Performed by: FAMILY MEDICINE

## 2023-08-14 RX ORDER — METFORMIN HYDROCHLORIDE 500 MG/1
500 TABLET ORAL
Qty: 90 TABLET | Refills: 1 | Status: SHIPPED | OUTPATIENT
Start: 2023-08-14 | End: 2023-11-12 | Stop reason: SDUPTHER

## 2023-08-14 RX ORDER — PHENAZOPYRIDINE HYDROCHLORIDE 200 MG/1
200 TABLET, FILM COATED ORAL 3 TIMES DAILY PRN
Qty: 9 TABLET | Refills: 0 | Status: SHIPPED | OUTPATIENT
Start: 2023-08-14 | End: 2023-08-17

## 2023-08-14 RX ORDER — TRIAMCINOLONE ACETONIDE 55 UG/1
2 SPRAY, METERED NASAL DAILY
Qty: 6.7 ML | Refills: 1 | Status: SHIPPED | OUTPATIENT
Start: 2023-08-14 | End: 2023-09-13

## 2023-08-14 NOTE — PROGRESS NOTES
C3 nurse attempted to contact Nehal Araujo  for a TCC post hospital discharge follow up call. No answer. Left voicemail with callback information. The patient has a scheduled HOSFU appointment with Angela Baez MD  on 08/14/2023 @ 1 pm.

## 2023-08-14 NOTE — PROGRESS NOTES
Connected Back: Patient Outreach    Movement Control Green week 4:    Exercises Deleted:   - 1/2 kneeling palloff press      Exercises Added:  - seated hip IR/ER with band    Note: Patient paused from 8/14 through 8/20, due to kidney surgery. Patient requested to take a week off of exercises for recovery period.

## 2023-08-14 NOTE — PROGRESS NOTES
C3 nurse spoke with Nehal Araujo  for a TCC post hospital discharge follow up call. The patient has a scheduled HOSFU appointment with Dr. Rachel on 08/18/2023. The patient had a scheduled HOSFU appointment with Angela Baez MD  on 08/14/2023.

## 2023-08-16 NOTE — PROGRESS NOTES
Patient ID: [id number]     Chief Complaint: swollen gland      HPI:     Nehal Araujo is a 62 y.o. female here today for acute visit-patient has multiple concerns.    1) She was recently hospitalized for kidney stone-presented to the emergency room on  with right flank pain-diagnosed with right obstructive uropathy-caused by proximal urethral calculus.  She underwent stent placement.  Since discharge to home has continued to be very uncomfortable-significant burning and spasms with urination.  Patient was unable to fill prescription for pain medication-has been taking ibuprofen 800 mg 3 times a day with only mild improvement in flank pain.  Does have follow-up appointment with urologist at the end of the week.  2)While patient was in the hospital she was told that she is diabetic and was started on insulin-has concerns about the diagnosis because she was recently placed on prednisone-and feel that elevated sugars were due to prednisone.  Would like to restart metformin which she had taken in the past for prediabetes-and reassess blood work.  3) Patient recently underwent antibiotic and prednisone treatment for lymphadenopathy-2 days ago  noticed  mild swelling on the opposite side of her face-concerns about reoccurrence of infection.  Since discharge patient has felt feverish-temperature of 100.2°-is also having problems with postnasal drip-ear fullness.      Past Medical History:   Diagnosis Date    History of gout 2022    Lumbar degenerative disc disease 2023    Pre-diabetes 2022    Primary osteoarthritis of right knee 2023        Past Surgical History:   Procedure Laterality Date    ARTHROSCOPY,KNEE,WITH MENISCUS REPAIR Right 2023    Procedure: ARTHROSCOPY,KNEE,WITH MENISCUS REPAIR;  Surgeon: Shay Daniels Jr., MD;  Location: [facility];  Service: Orthopedics;  Laterality: Right;    BUNIONECTOMY Bilateral      SECTION      x 3    CHOLECYSTECTOMY      COLECTOMY       CYSTOURETEROSCOPY,WITH HOLMIUM LASER LITHOTRIPSY OF URETERAL CALCULUS N/A 2023    Procedure: CYSTOURETEROSCOPY,WITH HOLMIUM LASER LITHOTRIPSY OF URETERAL CALCULUS;  Surgeon: Isrrael Rachel MD;  Location: [facility];  Service: Urology;  Laterality: N/A;    ENDOMETRIAL ABLATION      thumb surgery      TONSILLECTOMY      URETERAL STENT PLACEMENT Right 2023    Procedure: INSERTION, STENT, URETER;  Surgeon: Isrrael Rachel MD;  Location: [facility];  Service: Urology;  Laterality: Right;        Social History     Tobacco Use    Smoking status: Former     Current packs/day: 0.00     Average packs/day: 0.8 packs/day for 15.0 years (11.3 ttl pk-yrs)     Types: Cigarettes     Start date:      Quit date:      Years since quittin.6    Smokeless tobacco: Never   Substance Use Topics    Alcohol use: Yes     Alcohol/week: 5.0 standard drinks of alcohol     Types: 5 Drinks containing 0.5 oz of alcohol per week     Comment: sociallly    Drug use: Never        Social History     Socioeconomic History    Marital status:      Spouse name: Hardik    Number of children: 3        Current Outpatient Medications   Medication Instructions    amLODIPine (NORVASC) 5 mg, Oral, Daily    atorvastatin (LIPITOR) 40 mg, Oral, Daily    blood sugar diagnostic Strp To check BG 1 times daily, to use with insurance preferred meter    blood-glucose meter kit To check BG 1 times daily, to use with insurance preferred meter    icosapent ethyL (VASCEPA) 2 g, Oral, 2 times daily    ketorolac (TORADOL) 10 mg, Oral, Every 6 hours PRN    lancets Misc To check BG 1 times daily, to use with insurance preferred meter    meloxicam (MOBIC) 15 mg, Oral    metFORMIN (GLUCOPHAGE) 500 mg, Oral, With breakfast    omeprazole (PRILOSEC) 20 mg, Oral, Daily    phenazopyridine (PYRIDIUM) 200 mg, Oral, 3 times daily PRN    triamcinolone (NASACORT) 55 mcg nasal inhaler 2 sprays, Nasal, Daily       Review of patient's allergies indicates:  "  Allergen Reactions    Allopurinol analogues Rash    Percocet [oxycodone-acetaminophen] Itching        Patient Care Team:  Angela Baez MD as PCP - General (Family Medicine)  Sandy Gloria as        Subjective:     Review of Systems    12 point review of systems conducted, negative except as stated in the history of present illness. See HPI for details.      Objective:     Visit Vitals  BP (!) 138/90 (BP Location: Left arm, Patient Position: Sitting)   Pulse 81   Temp 99.5 °F (37.5 °C)   Ht 5' 3" (1.6 m)   Wt 79.2 kg (174 lb 9.6 oz)   SpO2 98%   BMI 30.93 kg/m²       Physical Exam    General: Alert and oriented, No acute distress.  Head: Normocephalic, Atraumatic.  Eye: Pupils are equal, round and reactive to light, Extraocular movements are intact, Sclera non-icteric.  Ears/Nose/Throat: Normal-middle ear fluid bilaterally, Mucosa moist,Clear.  Neck/Thyroid:  Mild enlargement of cervical lymph node Full range of motion.  Respiratory: Clear to auscultation bilaterally  Cardiovascular: Regular rate and rhythm, S1/S2 normal, No murmurs, rubs or gallops.  Gastrointestinal: Soft, mild right flank pain, bowel sounds present  Musculoskeletal: Normal range of motion.  Integumentary: Warm, Dry, Intact  Extremities: No clubbing, cyanosis or edema  Neurologic: No focal deficits, Cranial Nerves II-XII are grossly intact  Psychiatric: Normal interaction, Coherent speech, Euthymic mood, Appropriate affect       Assessment:       ICD-10-CM ICD-9-CM   1. Hospital discharge follow-up  Z09 V67.59   2. Dysuria  R30.0 788.1   3. Pre-diabetes  R73.03 790.29   4. Essential hypertension  I10 401.9   5. Localized swelling, mass and lump, neck  R22.1 784.2   6. Seasonal allergies  J30.2 477.9        Plan:     1. Hospital discharge follow-up  Patient was hospitalized for  right obstructive uropathy-caused by proximal urethral calculus-underwent urethral stent placement-since discharge patient has been very " uncomfortable-treatment options discussed.  Patient to recheck UA-Rx prescriptions for symptomatic relief-patient to call office with update in 24-48 hours.   Discharge instructions reviewed-medications reviewed-changes updated to patient's chart.   - CBC Without Differential; Future    2. Dysuria  Check studies management based upon results.  Pathophysiology/treatment/dangers discussed.   - Urinalysis, Reflex to Urine Culture  - phenazopyridine (PYRIDIUM) 200 MG tablet; Take 1 tablet (200 mg total) by mouth 3 (three) times daily as needed for Pain.  Dispense: 9 tablet; Refill: 0  - Urinalysis, Microscopic    3. Pre-diabetes  Hemoglobin A1c  6.6  Normal less than 5.7 - Diagnosis of Type 2 Diabetes Mellitus at 6.5. Encourage diet and activity modification- patient restarted on metformin 500 mg once a day.  Pathophysiology/treatment/dangers discussed.  - Hemoglobin A1C; Future  - Comprehensive Metabolic Panel; Future  - Lipid Panel; Future  - metFORMIN (GLUCOPHAGE) 500 MG tablet; Take 1 tablet (500 mg total) by mouth daily with breakfast.  Dispense: 90 tablet; Refill: 1    4. Essential hypertension  Patient has been taking medication-Norvasc 5 mg. Blood pressure goal of less than 130/80-blood pressure is elevated related to patient's pain.  We will continue to monitor blood pressure.     5. Localized swelling, mass and lump, neck  Pathophysiology/Treatment/ Dangers Discussed.  Ultrasound results reviewed.  If continued swelling patient needs referral to ENT.    6. Seasonal allergies  Pathophysiology/Treatment/ Dangers Discussed.  Trial of nasal steroid for symptomatic relief.   - triamcinolone (NASACORT) 55 mcg nasal inhaler; 2 sprays by Nasal route once daily.  Dispense: 6.7 mL; Refill: 1       Follow up in about 3 months (around 11/14/2023). In addition to their scheduled follow up, the patient has also been instructed to follow up on as needed basis.     Future Appointments   Date Time Provider Department Center    10/11/2023  9:00 AM [facility]   11/14/2023 12:00 PM Angela Baez MD [facility]        Angela Baez MD

## 2023-08-25 ENCOUNTER — PATIENT OUTREACH (OUTPATIENT)
Dept: OTHER | Facility: OTHER | Age: 63
End: 2023-08-25
Payer: COMMERCIAL

## 2023-08-25 ENCOUNTER — PATIENT MESSAGE (OUTPATIENT)
Dept: ADMINISTRATIVE | Facility: OTHER | Age: 63
End: 2023-08-25
Payer: COMMERCIAL

## 2023-08-25 NOTE — PROGRESS NOTES
Connected Back: Patient Outreach    Spoke with patient on 8/25. No pain while performing exercises, post procedure.

## 2023-08-29 ENCOUNTER — APPOINTMENT (OUTPATIENT)
Dept: LAB | Facility: HOSPITAL | Age: 63
End: 2023-08-29
Attending: UROLOGY
Payer: COMMERCIAL

## 2023-08-29 DIAGNOSIS — N39.0 URINARY TRACT INFECTION, SITE NOT SPECIFIED: Primary | ICD-10-CM

## 2023-08-29 LAB — PSYCHE PATHOLOGY RESULT: NORMAL

## 2023-08-29 PROCEDURE — 87088 URINE BACTERIA CULTURE: CPT

## 2023-08-30 DIAGNOSIS — K21.9 GASTROESOPHAGEAL REFLUX DISEASE, UNSPECIFIED WHETHER ESOPHAGITIS PRESENT: ICD-10-CM

## 2023-08-30 RX ORDER — OMEPRAZOLE 20 MG/1
20 CAPSULE, DELAYED RELEASE ORAL DAILY
Qty: 30 CAPSULE | Refills: 3 | Status: SHIPPED | OUTPATIENT
Start: 2023-08-30 | End: 2023-10-04 | Stop reason: SDUPTHER

## 2023-09-01 ENCOUNTER — PATIENT MESSAGE (OUTPATIENT)
Dept: ADMINISTRATIVE | Facility: OTHER | Age: 63
End: 2023-09-01
Payer: COMMERCIAL

## 2023-09-01 LAB — BACTERIA UR CULT: NORMAL

## 2023-09-06 ENCOUNTER — PATIENT OUTREACH (OUTPATIENT)
Dept: OTHER | Facility: OTHER | Age: 63
End: 2023-09-06
Payer: COMMERCIAL

## 2023-09-06 NOTE — PROGRESS NOTES
(Week 5 - Movement Control Green):    Exercises Deleted:   - Modified side plank with clam    Exercises Added:  - Standing hip abduction and extension

## 2023-09-09 ENCOUNTER — PATIENT MESSAGE (OUTPATIENT)
Dept: ADMINISTRATIVE | Facility: OTHER | Age: 63
End: 2023-09-09
Payer: COMMERCIAL

## 2023-09-11 ENCOUNTER — PATIENT OUTREACH (OUTPATIENT)
Dept: OTHER | Facility: OTHER | Age: 63
End: 2023-09-11
Payer: COMMERCIAL

## 2023-09-11 NOTE — PROGRESS NOTES
Connected Back: Patient Outreach    Weekly Questionnaire Flag - patient chose to stay on last weeks exercises. Closing task.

## 2023-09-12 ENCOUNTER — OFFICE VISIT (OUTPATIENT)
Dept: FAMILY MEDICINE | Facility: CLINIC | Age: 63
End: 2023-09-12
Payer: COMMERCIAL

## 2023-09-12 VITALS
SYSTOLIC BLOOD PRESSURE: 137 MMHG | OXYGEN SATURATION: 97 % | TEMPERATURE: 99 F | WEIGHT: 173.38 LBS | HEIGHT: 63 IN | BODY MASS INDEX: 30.72 KG/M2 | DIASTOLIC BLOOD PRESSURE: 85 MMHG | HEART RATE: 86 BPM

## 2023-09-12 DIAGNOSIS — R35.0 URINARY FREQUENCY: Primary | ICD-10-CM

## 2023-09-12 DIAGNOSIS — Z00.00 WELLNESS EXAMINATION: ICD-10-CM

## 2023-09-12 PROCEDURE — 3044F HG A1C LEVEL LT 7.0%: CPT | Mod: CPTII,,, | Performed by: FAMILY MEDICINE

## 2023-09-12 PROCEDURE — 3075F SYST BP GE 130 - 139MM HG: CPT | Mod: CPTII,,, | Performed by: FAMILY MEDICINE

## 2023-09-12 PROCEDURE — 3008F PR BODY MASS INDEX (BMI) DOCUMENTED: ICD-10-PCS | Mod: CPTII,,, | Performed by: FAMILY MEDICINE

## 2023-09-12 PROCEDURE — 99213 OFFICE O/P EST LOW 20 MIN: CPT | Mod: ,,, | Performed by: FAMILY MEDICINE

## 2023-09-12 PROCEDURE — 3044F PR MOST RECENT HEMOGLOBIN A1C LEVEL <7.0%: ICD-10-PCS | Mod: CPTII,,, | Performed by: FAMILY MEDICINE

## 2023-09-12 PROCEDURE — 1160F PR REVIEW ALL MEDS BY PRESCRIBER/CLIN PHARMACIST DOCUMENTED: ICD-10-PCS | Mod: CPTII,,, | Performed by: FAMILY MEDICINE

## 2023-09-12 PROCEDURE — 3075F PR MOST RECENT SYSTOLIC BLOOD PRESS GE 130-139MM HG: ICD-10-PCS | Mod: CPTII,,, | Performed by: FAMILY MEDICINE

## 2023-09-12 PROCEDURE — 1159F MED LIST DOCD IN RCRD: CPT | Mod: CPTII,,, | Performed by: FAMILY MEDICINE

## 2023-09-12 PROCEDURE — 4010F PR ACE/ARB THEARPY RXD/TAKEN: ICD-10-PCS | Mod: CPTII,,, | Performed by: FAMILY MEDICINE

## 2023-09-12 PROCEDURE — 99213 PR OFFICE/OUTPT VISIT, EST, LEVL III, 20-29 MIN: ICD-10-PCS | Mod: ,,, | Performed by: FAMILY MEDICINE

## 2023-09-12 PROCEDURE — 3079F DIAST BP 80-89 MM HG: CPT | Mod: CPTII,,, | Performed by: FAMILY MEDICINE

## 2023-09-12 PROCEDURE — 1159F PR MEDICATION LIST DOCUMENTED IN MEDICAL RECORD: ICD-10-PCS | Mod: CPTII,,, | Performed by: FAMILY MEDICINE

## 2023-09-12 PROCEDURE — 3079F PR MOST RECENT DIASTOLIC BLOOD PRESSURE 80-89 MM HG: ICD-10-PCS | Mod: CPTII,,, | Performed by: FAMILY MEDICINE

## 2023-09-12 PROCEDURE — 4010F ACE/ARB THERAPY RXD/TAKEN: CPT | Mod: CPTII,,, | Performed by: FAMILY MEDICINE

## 2023-09-12 PROCEDURE — 1160F RVW MEDS BY RX/DR IN RCRD: CPT | Mod: CPTII,,, | Performed by: FAMILY MEDICINE

## 2023-09-12 PROCEDURE — 3008F BODY MASS INDEX DOCD: CPT | Mod: CPTII,,, | Performed by: FAMILY MEDICINE

## 2023-09-12 RX ORDER — LANOLIN ALCOHOL/MO/W.PET/CERES
1 CREAM (GRAM) TOPICAL
COMMUNITY
Start: 2023-08-18 | End: 2024-03-12

## 2023-09-12 RX ORDER — LANCETS 28 GAUGE
EACH MISCELLANEOUS
COMMUNITY
Start: 2023-08-08 | End: 2023-09-12

## 2023-09-12 RX ORDER — KETOROLAC TROMETHAMINE 10 MG/1
10 TABLET, FILM COATED ORAL EVERY 6 HOURS PRN
COMMUNITY
Start: 2023-08-21 | End: 2023-09-13

## 2023-09-12 RX ORDER — LANCETS 33 GAUGE
100 EACH MISCELLANEOUS 2 TIMES DAILY
COMMUNITY
End: 2023-10-03 | Stop reason: SDUPTHER

## 2023-09-13 NOTE — PROGRESS NOTES
Patient ID: [id number]     Chief Complaint: Referral (Urinary Tract Infection symptoms)      HPI:     Nehal Araujo is a 62 y.o. female here today for acute visit  1) Patient is continuing to have problems with urinary frequency and discomfort-concerns about appointment with urologist discussed.       Past Medical History:   Diagnosis Date    History of gout 2022    Lumbar degenerative disc disease 2023    Pre-diabetes 2022    Primary osteoarthritis of right knee 2023        Past Surgical History:   Procedure Laterality Date    ARTHROSCOPY,KNEE,WITH MENISCUS REPAIR Right 2023    Procedure: ARTHROSCOPY,KNEE,WITH MENISCUS REPAIR;  Surgeon: Shay Daniels Jr., MD;  Location: [facility];  Service: Orthopedics;  Laterality: Right;    BUNIONECTOMY Bilateral      SECTION      x 3    CHOLECYSTECTOMY      COLECTOMY      CYSTOURETEROSCOPY,WITH HOLMIUM LASER LITHOTRIPSY OF URETERAL CALCULUS N/A 2023    Procedure: CYSTOURETEROSCOPY,WITH HOLMIUM LASER LITHOTRIPSY OF URETERAL CALCULUS;  Surgeon: Isrrael Rachel MD;  Location: [facility];  Service: Urology;  Laterality: N/A;    ENDOMETRIAL ABLATION      thumb surgery      TONSILLECTOMY      URETERAL STENT PLACEMENT Right 2023    Procedure: INSERTION, STENT, URETER;  Surgeon: Isrrael Rachel MD;  Location: [facility];  Service: Urology;  Laterality: Right;        Social History     Tobacco Use    Smoking status: Former     Current packs/day: 0.00     Average packs/day: 0.8 packs/day for 15.0 years (11.3 ttl pk-yrs)     Types: Cigarettes     Start date:      Quit date: 2015     Years since quittin.7    Smokeless tobacco: Never   Substance Use Topics    Alcohol use: Yes     Alcohol/week: 5.0 standard drinks of alcohol     Types: 5 Drinks containing 0.5 oz of alcohol per week     Comment: sociallly    Drug use: Never        Social History     Socioeconomic History    Marital status:      Spouse name: Hardik    Number of children: 3     "    Current Outpatient Medications   Medication Instructions    amLODIPine (NORVASC) 5 mg, Oral, Daily    atorvastatin (LIPITOR) 40 mg, Oral, Daily    blood sugar diagnostic (ONETOUCH VERIO TEST STRIPS MISC) 100 strips, Misc.(Non-Drug; Combo Route), 2 times daily, Check glucose levels twice a day    icosapent ethyL (VASCEPA) 2 g, Oral, 2 times daily    ketorolac (TORADOL) 10 mg, Oral, Every 6 hours PRN    lancets (ONETOUCH DELICA PLUS LANCET) 33 gauge Misc 100 lancets, Misc.(Non-Drug; Combo Route), 2 times daily, Check blood glucose levels twice a day    magnesium oxide (MAG-OX) 400 mg (241.3 mg magnesium) tablet 1 tablet, Oral    meloxicam (MOBIC) 15 mg, Oral    metFORMIN (GLUCOPHAGE) 500 mg, Oral, With breakfast    omeprazole (PRILOSEC) 20 mg, Oral, Daily    triamcinolone (NASACORT) 55 mcg nasal inhaler 2 sprays, Nasal, Daily       Review of patient's allergies indicates:   Allergen Reactions    Allopurinol analogues Rash    Percocet [oxycodone-acetaminophen] Itching        Patient Care Team:  Angela Baez MD as PCP - General (Family Medicine)  Sandy Gloria as   Isrrael Rachel MD as Consulting Physician (Urology)       Subjective:     Review of Systems    12 point review of systems conducted, negative except as stated in the history of present illness. See HPI for details.      Objective:     Visit Vitals  /85   Pulse 86   Temp 99.3 °F (37.4 °C)   Ht 5' 3" (1.6 m)   Wt 78.7 kg (173 lb 6.4 oz)   SpO2 97%   BMI 30.72 kg/m²       Physical Exam    General: Alert and oriented, No acute distress.  Head: Normocephalic, Atraumatic.  Eye: Pupils are equal, round and reactive to light, Extraocular movements are intact, Sclera non-icteric.  Ears/Nose/Throat: Normal, Mucosa moist,Clear.  Neck/Thyroid: Supple,Full range of motion.  Respiratory: Clear to auscultation bilaterally  Cardiovascular: Regular rate and rhythm  Gastrointestinal: Soft, Non-tender, Non-distended, Normal bowel sounds, No CVA " tenderness  Musculoskeletal: Normal range of motion.  Integumentary: Warm, Dry, Intact  Extremities: No clubbing, cyanosis or edema  Neurologic: No focal deficits, Cranial Nerves II-XII are grossly intact,   Psychiatric: Normal interaction, Coherent speech, Euthymic mood, Appropriate affect       Assessment:       ICD-10-CM ICD-9-CM   1. Urinary frequency  R35.0 788.41   2. Wellness examination  Z00.00 V70.0        Plan:     1. Urinary frequency  Pathophysiology/Treatment/ Dangers Discussed.  Per patient's request referral to urologist.    - Ambulatory referral/consult to Urology; Future    2. Wellness examination  Patient given lab orders to be completed before wellness visit.   - CBC Auto Differential; Future  - Comprehensive Metabolic Panel; Future  - Lipid Panel; Future  - TSH; Future  - Hemoglobin A1C; Future  - Urinalysis; Future  - Urinalysis         Follow up if symptoms worsen or fail to improve. In addition to their scheduled follow up, the patient has also been instructed to follow up on as needed basis.     Future Appointments   Date Time Provider Department Center   11/14/2023 12:00 PM Angela Baez MD [facility] [place] Baez MD

## 2023-09-17 ENCOUNTER — PATIENT MESSAGE (OUTPATIENT)
Dept: ADMINISTRATIVE | Facility: OTHER | Age: 63
End: 2023-09-17
Payer: COMMERCIAL

## 2023-09-19 ENCOUNTER — PATIENT OUTREACH (OUTPATIENT)
Dept: OTHER | Facility: OTHER | Age: 63
End: 2023-09-19
Payer: COMMERCIAL

## 2023-09-19 ENCOUNTER — TELEPHONE (OUTPATIENT)
Dept: FAMILY MEDICINE | Facility: CLINIC | Age: 63
End: 2023-09-19

## 2023-09-19 NOTE — PROGRESS NOTES
Connected Back: Patient Outreach    (Week 6):    Exercises Deleted:    -Standing hip extension/abduction  - Mod side plank with clam      Exercises Added:  - Standing TKE

## 2023-09-19 NOTE — TELEPHONE ENCOUNTER
----- Message from Lena Hancock sent at 9/15/2023 10:20 AM CDT -----  Pt said she have not heard from a urologist neither dermatologist or rheumatologist and its been a while since her referrals. Pt said she called [facility] urologist and they said they have not received referral fax number there is [phone]

## 2023-09-20 DIAGNOSIS — N20.0 KIDNEY STONE: Primary | ICD-10-CM

## 2023-09-22 ENCOUNTER — HOSPITAL ENCOUNTER (OUTPATIENT)
Dept: RADIOLOGY | Facility: HOSPITAL | Age: 63
Discharge: HOME | End: 2023-09-22
Attending: UROLOGY
Payer: COMMERCIAL

## 2023-09-22 DIAGNOSIS — N20.0 KIDNEY STONE: ICD-10-CM

## 2023-09-22 DIAGNOSIS — N20.0 CALCULUS OF KIDNEY: ICD-10-CM

## 2023-09-22 LAB
APPEARANCE UR: CLEAR
BILIRUB UR QL STRIP.AUTO: NEGATIVE
COLOR UR: YELLOW
GLUCOSE UR QL STRIP.AUTO: NEGATIVE
KETONES UR QL STRIP.AUTO: NEGATIVE
LEUKOCYTE ESTERASE UR QL STRIP.AUTO: NEGATIVE
NITRITE UR QL STRIP.AUTO: NEGATIVE
PH UR STRIP.AUTO: 5.5 [PH]
PROT UR QL STRIP.AUTO: NEGATIVE
RBC UR QL AUTO: NEGATIVE
SP GR UR STRIP.AUTO: <=1.005 (ref 1–1.03)
UROBILINOGEN UR STRIP-ACNC: 0.2

## 2023-09-22 PROCEDURE — 74018 RADEX ABDOMEN 1 VIEW: CPT | Mod: TC

## 2023-09-22 PROCEDURE — 76770 US EXAM ABDO BACK WALL COMP: CPT | Mod: TC

## 2023-09-27 ENCOUNTER — PATIENT MESSAGE (OUTPATIENT)
Dept: ADMINISTRATIVE | Facility: OTHER | Age: 63
End: 2023-09-27
Payer: COMMERCIAL

## 2023-10-02 DIAGNOSIS — R73.03 PRE-DIABETES: Primary | Chronic | ICD-10-CM

## 2023-10-03 RX ORDER — LANCETS 33 GAUGE
100 EACH MISCELLANEOUS 2 TIMES DAILY
Qty: 1 EACH | Refills: 3 | Status: SHIPPED | OUTPATIENT
Start: 2023-10-03 | End: 2023-10-04 | Stop reason: SDUPTHER

## 2023-10-03 NOTE — TELEPHONE ENCOUNTER
----- Message from Lena Hancock sent at 10/2/2023  2:51 PM CDT -----  Regarding: refill  [facility] at [address] [place] [address]  is requesting a refill on one touch delica plus 33g lancet and one touch verio ultra strip

## 2023-10-04 DIAGNOSIS — R73.03 PRE-DIABETES: Chronic | ICD-10-CM

## 2023-10-04 DIAGNOSIS — K21.9 GASTROESOPHAGEAL REFLUX DISEASE, UNSPECIFIED WHETHER ESOPHAGITIS PRESENT: ICD-10-CM

## 2023-10-04 RX ORDER — OMEPRAZOLE 20 MG/1
20 CAPSULE, DELAYED RELEASE ORAL DAILY
Qty: 30 CAPSULE | Refills: 3 | Status: SHIPPED | OUTPATIENT
Start: 2023-10-04 | End: 2023-11-12 | Stop reason: SDUPTHER

## 2023-10-09 ENCOUNTER — PATIENT MESSAGE (OUTPATIENT)
Dept: ADMINISTRATIVE | Facility: OTHER | Age: 63
End: 2023-10-09
Payer: COMMERCIAL

## 2023-10-09 RX ORDER — LANCETS 33 GAUGE
EACH MISCELLANEOUS
Qty: 100 EACH | Refills: 3 | Status: SHIPPED | OUTPATIENT
Start: 2023-10-03 | End: 2024-03-12

## 2023-10-11 ENCOUNTER — TELEPHONE (OUTPATIENT)
Dept: FAMILY MEDICINE | Facility: CLINIC | Age: 63
End: 2023-10-11
Payer: COMMERCIAL

## 2023-10-11 NOTE — TELEPHONE ENCOUNTER
[facility] called to inform that they contacted the pt regarding her referral and she declined a visit at this time and would call them to schedule when she needed.

## 2023-10-13 ENCOUNTER — PATIENT MESSAGE (OUTPATIENT)
Dept: ADMINISTRATIVE | Facility: OTHER | Age: 63
End: 2023-10-13
Payer: COMMERCIAL

## 2023-10-17 ENCOUNTER — PATIENT MESSAGE (OUTPATIENT)
Dept: ADMINISTRATIVE | Facility: OTHER | Age: 63
End: 2023-10-17
Payer: COMMERCIAL

## 2023-10-23 ENCOUNTER — PATIENT MESSAGE (OUTPATIENT)
Dept: OTHER | Facility: OTHER | Age: 63
End: 2023-10-23
Payer: COMMERCIAL

## 2023-10-30 ENCOUNTER — PATIENT OUTREACH (OUTPATIENT)
Dept: OTHER | Facility: OTHER | Age: 63
End: 2023-10-30
Payer: COMMERCIAL

## 2023-10-30 ENCOUNTER — PATIENT MESSAGE (OUTPATIENT)
Dept: ADMINISTRATIVE | Facility: OTHER | Age: 63
End: 2023-10-30
Payer: COMMERCIAL

## 2023-10-30 NOTE — PROGRESS NOTES
Connected Back: Patient Outreach    Non-Adherence - Sent [employer] message, adjusted exercises.     (Week 9):    Exercises Deleted:   - Mod side plank with clam      Exercises Added:  -Standing terminal knee extension

## 2023-11-02 ENCOUNTER — PATIENT OUTREACH (OUTPATIENT)
Dept: ADMINISTRATIVE | Facility: HOSPITAL | Age: 63
End: 2023-11-02
Payer: COMMERCIAL

## 2023-11-06 ENCOUNTER — PATIENT MESSAGE (OUTPATIENT)
Dept: ADMINISTRATIVE | Facility: OTHER | Age: 63
End: 2023-11-06
Payer: COMMERCIAL

## 2023-11-10 ENCOUNTER — PATIENT OUTREACH (OUTPATIENT)
Dept: OTHER | Facility: OTHER | Age: 63
End: 2023-11-10
Payer: COMMERCIAL

## 2023-11-10 NOTE — PROGRESS NOTES
Connected Back: Patient Outreach    (Week 10):    Exercises Deleted:   - 1/2 kneeling diagonal lift  - 1/2 kneeling chops    Exercises Added:  - Swimmers  - Supermans

## 2023-11-13 DIAGNOSIS — K21.9 GASTROESOPHAGEAL REFLUX DISEASE, UNSPECIFIED WHETHER ESOPHAGITIS PRESENT: ICD-10-CM

## 2023-11-13 RX ORDER — OMEPRAZOLE 20 MG/1
20 CAPSULE, DELAYED RELEASE ORAL
Qty: 30 CAPSULE | Refills: 3 | OUTPATIENT
Start: 2023-11-13

## 2023-11-14 ENCOUNTER — PATIENT MESSAGE (OUTPATIENT)
Dept: ADMINISTRATIVE | Facility: OTHER | Age: 63
End: 2023-11-14
Payer: COMMERCIAL

## 2023-11-14 ENCOUNTER — PATIENT OUTREACH (OUTPATIENT)
Dept: OTHER | Facility: OTHER | Age: 63
End: 2023-11-14
Payer: COMMERCIAL

## 2023-11-14 ENCOUNTER — OFFICE VISIT (OUTPATIENT)
Dept: FAMILY MEDICINE | Facility: CLINIC | Age: 63
End: 2023-11-14
Payer: COMMERCIAL

## 2023-11-14 VITALS
HEART RATE: 96 BPM | OXYGEN SATURATION: 97 % | BODY MASS INDEX: 31.83 KG/M2 | DIASTOLIC BLOOD PRESSURE: 82 MMHG | TEMPERATURE: 99 F | WEIGHT: 179.63 LBS | HEIGHT: 63 IN | SYSTOLIC BLOOD PRESSURE: 133 MMHG

## 2023-11-14 DIAGNOSIS — Z00.00 WELLNESS EXAMINATION: Primary | ICD-10-CM

## 2023-11-14 DIAGNOSIS — Z87.442 HISTORY OF KIDNEY STONES: ICD-10-CM

## 2023-11-14 DIAGNOSIS — Z87.39 HISTORY OF GOUT: ICD-10-CM

## 2023-11-14 DIAGNOSIS — M15.9 PRIMARY OSTEOARTHRITIS INVOLVING MULTIPLE JOINTS: ICD-10-CM

## 2023-11-14 DIAGNOSIS — E78.2 MIXED HYPERLIPIDEMIA: ICD-10-CM

## 2023-11-14 DIAGNOSIS — R73.03 PRE-DIABETES: Chronic | ICD-10-CM

## 2023-11-14 DIAGNOSIS — I10 ESSENTIAL HYPERTENSION: ICD-10-CM

## 2023-11-14 PROCEDURE — 1160F PR REVIEW ALL MEDS BY PRESCRIBER/CLIN PHARMACIST DOCUMENTED: ICD-10-PCS | Mod: CPTII,,, | Performed by: FAMILY MEDICINE

## 2023-11-14 PROCEDURE — 3044F PR MOST RECENT HEMOGLOBIN A1C LEVEL <7.0%: ICD-10-PCS | Mod: CPTII,,, | Performed by: FAMILY MEDICINE

## 2023-11-14 PROCEDURE — 4010F ACE/ARB THERAPY RXD/TAKEN: CPT | Mod: CPTII,,, | Performed by: FAMILY MEDICINE

## 2023-11-14 PROCEDURE — 3079F PR MOST RECENT DIASTOLIC BLOOD PRESSURE 80-89 MM HG: ICD-10-PCS | Mod: CPTII,,, | Performed by: FAMILY MEDICINE

## 2023-11-14 PROCEDURE — 1160F RVW MEDS BY RX/DR IN RCRD: CPT | Mod: CPTII,,, | Performed by: FAMILY MEDICINE

## 2023-11-14 PROCEDURE — 3008F BODY MASS INDEX DOCD: CPT | Mod: CPTII,,, | Performed by: FAMILY MEDICINE

## 2023-11-14 PROCEDURE — 3044F HG A1C LEVEL LT 7.0%: CPT | Mod: CPTII,,, | Performed by: FAMILY MEDICINE

## 2023-11-14 PROCEDURE — 3008F PR BODY MASS INDEX (BMI) DOCUMENTED: ICD-10-PCS | Mod: CPTII,,, | Performed by: FAMILY MEDICINE

## 2023-11-14 PROCEDURE — 4010F PR ACE/ARB THEARPY RXD/TAKEN: ICD-10-PCS | Mod: CPTII,,, | Performed by: FAMILY MEDICINE

## 2023-11-14 PROCEDURE — 1159F PR MEDICATION LIST DOCUMENTED IN MEDICAL RECORD: ICD-10-PCS | Mod: CPTII,,, | Performed by: FAMILY MEDICINE

## 2023-11-14 PROCEDURE — 1159F MED LIST DOCD IN RCRD: CPT | Mod: CPTII,,, | Performed by: FAMILY MEDICINE

## 2023-11-14 PROCEDURE — 3075F SYST BP GE 130 - 139MM HG: CPT | Mod: CPTII,,, | Performed by: FAMILY MEDICINE

## 2023-11-14 PROCEDURE — 3079F DIAST BP 80-89 MM HG: CPT | Mod: CPTII,,, | Performed by: FAMILY MEDICINE

## 2023-11-14 PROCEDURE — 99396 PREV VISIT EST AGE 40-64: CPT | Mod: ,,, | Performed by: FAMILY MEDICINE

## 2023-11-14 PROCEDURE — 99396 PR PREVENTIVE VISIT,EST,40-64: ICD-10-PCS | Mod: ,,, | Performed by: FAMILY MEDICINE

## 2023-11-14 PROCEDURE — 3075F PR MOST RECENT SYSTOLIC BLOOD PRESS GE 130-139MM HG: ICD-10-PCS | Mod: CPTII,,, | Performed by: FAMILY MEDICINE

## 2023-11-14 RX ORDER — MELOXICAM 15 MG/1
15 TABLET ORAL DAILY
Qty: 90 TABLET | Refills: 2 | Status: SHIPPED | OUTPATIENT
Start: 2023-11-14 | End: 2024-01-31

## 2023-11-14 RX ORDER — SODIUM BICARBONATE 650 MG/1
1 TABLET ORAL 3 TIMES DAILY
COMMUNITY

## 2023-11-14 RX ORDER — FEBUXOSTAT 40 MG/1
1 TABLET, FILM COATED ORAL DAILY
COMMUNITY

## 2023-11-14 RX ORDER — KETOROLAC TROMETHAMINE 10 MG/1
1 TABLET, FILM COATED ORAL EVERY 6 HOURS PRN
COMMUNITY
End: 2023-11-14

## 2023-11-14 RX ORDER — TRIAMCINOLONE ACETONIDE 55 UG/1
2 SPRAY, METERED NASAL DAILY
COMMUNITY

## 2023-11-14 NOTE — PROGRESS NOTES
Patient ID: [id number]     Chief Complaint: Wellness      HPI:     Nehal Araujo is a 63 y.o. female here today for an annual wellness. Reviewed and discussed lab results.   1) History of recurrent kidney stones:  Patient is concerned about the medications that she was started on by urologist-kidney stones were related to uric acid-patient has not had a gout attack in years-questioning long-term benefits of these medications  2) Hypertension: Patient has been taking medicine daily. BP is normal at home. No symptoms associated with increased BP such as headache/ visual changes/ dizziness/ chest pain.    3) Prediabetic: Patient is taking metformin in order to help prevent diabetes-no side effects of medicine noticeable  4)  Reflux: Patient is continuing to take medication-no symptoms associated with reflux.  No noticeable side effects of medication.     Past Medical History:   Diagnosis Date    History of gout 2022    History of kidney stones 2023    Lumbar degenerative disc disease 2023    Pre-diabetes 2022    Primary osteoarthritis of right knee 2023        Past Surgical History:   Procedure Laterality Date    ARTHROSCOPY,KNEE,WITH MENISCUS REPAIR Right 2023    Procedure: ARTHROSCOPY,KNEE,WITH MENISCUS REPAIR;  Surgeon: Shay Daniels Jr., MD;  Location: [facility];  Service: Orthopedics;  Laterality: Right;    BUNIONECTOMY Bilateral      SECTION      x 3    CHOLECYSTECTOMY      COLECTOMY      CYSTOURETEROSCOPY,WITH HOLMIUM LASER LITHOTRIPSY OF URETERAL CALCULUS N/A 2023    Procedure: CYSTOURETEROSCOPY,WITH HOLMIUM LASER LITHOTRIPSY OF URETERAL CALCULUS;  Surgeon: Isrrael Rachel MD;  Location: [facility] OR;  Service: Urology;  Laterality: N/A;    ENDOMETRIAL ABLATION      thumb surgery      TONSILLECTOMY      URETERAL STENT PLACEMENT Right 2023    Procedure: INSERTION, STENT, URETER;  Surgeon: Isrrael Rachel MD;  Location: [facility];  Service: Urology;  Laterality:  Right;        Social History     Tobacco Use    Smoking status: Former     Current packs/day: 0.00     Average packs/day: 0.8 packs/day for 15.0 years (11.3 ttl pk-yrs)     Types: Cigarettes     Start date:      Quit date:      Years since quittin.8    Smokeless tobacco: Never   Substance Use Topics    Alcohol use: Yes     Alcohol/week: 5.0 standard drinks of alcohol     Types: 5 Drinks containing 0.5 oz of alcohol per week     Comment: sociallly    Drug use: Never        Social History     Socioeconomic History    Marital status:      Spouse name: Hardik    Number of children: 3        Current Outpatient Medications   Medication Instructions    amLODIPine (NORVASC) 5 mg, Oral, Daily    atorvastatin (LIPITOR) 40 mg, Oral, Daily    blood sugar diagnostic (ONETOUCH VERIO TEST STRIPS) Strp 50 each, Misc.(Non-Drug; Combo Route), 2 times daily, Check glucose levels twice a day    febuxostat (ULORIC) 40 mg Tab 1 tablet, Oral, Daily    lancets (ONETOUCH DELICA PLUS LANCET) 33 gauge Misc 100 lancets, Misc.(Non-Drug; Combo Route), 2 times daily, Check blood glucose levels twice a day    magnesium oxide (MAG-OX) 400 mg (241.3 mg magnesium) tablet 1 tablet, Oral    meloxicam (MOBIC) 15 mg, Oral, Daily    metFORMIN (GLUCOPHAGE) 500 mg, Oral, With breakfast    omeprazole (PRILOSEC) 20 mg, Oral, Daily    sodium bicarbonate 650 MG tablet 1 tablet, Oral, 3 times daily    triamcinolone (NASACORT) 55 mcg nasal inhaler 2 sprays, Each Nostril, Daily       Review of patient's allergies indicates:   Allergen Reactions    Allopurinol analogues Rash    Hydrochlorothiazide Palpitations    Percocet [oxycodone-acetaminophen] Itching        Patient Care Team:  Angela Baez MD as PCP - General (Family Medicine)  Sandy Gloria as   Irsrael Rachel MD as Consulting Physician (Urology)  Urology-Referrals, Tavo Tadeo MD as Consulting Physician (Rheumatology)  Angela Joyce MD (Dermatology)  "    Subjective:     Review of Systems    12 point review of systems conducted, negative except as stated in the history of present illness. See HPI for details.      Objective:     Visit Vitals  /82   Pulse 96   Temp 99.2 °F (37.3 °C)   Ht 5' 3" (1.6 m)   Wt 81.5 kg (179 lb 9.6 oz)   SpO2 97%   BMI 31.81 kg/m²       Physical Exam    General: Alert and oriented, No acute distress.  Head: Normocephalic, Atraumatic.  Eye: Pupils are equal, round and reactive to light, Extraocular movements are intact, Sclera non-icteric.  Ears/Nose/Throat: Normal, Mucosa moist,Clear.  Neck/Thyroid: Supple, Non-tender, No carotid bruit, No palpable thyromegaly or thyroid nodule, No lymphadenopathy, No JVD, Full range of motion.  Respiratory: Clear to auscultation bilaterally; No wheezes, rales or rhonchi,Non-labored respirations, Symmetrical chest wall expansion.  Cardiovascular: Regular rate and rhythm, S1/S2 normal, No murmurs, rubs or gallops.  Gastrointestinal: Soft, Non-tender, Non-distended, Normal bowel sounds, No palpable organomegaly.  Musculoskeletal: Normal range of motion.  Integumentary: Warm, Dry, Intact, No suspicious lesions or rashes.  Extremities: No clubbing, cyanosis or edema  Neurologic: No focal deficits, Cranial Nerves II-XII are grossly intact, Motor strength normal upper and lower extremities, Sensory exam intact.  Psychiatric: Normal interaction, Coherent speech, Euthymic mood, Appropriate affect       Assessment:       ICD-10-CM ICD-9-CM   1. Wellness examination  Z00.00 V70.0   2. Essential hypertension  I10 401.9   3. Mixed hyperlipidemia  E78.2 272.2   4. Pre-diabetes  R73.03 790.29   5. Primary osteoarthritis involving multiple joints  M15.9 715.98   6. History of kidney stones  Z87.442 V13.01   7. History of gout  Z87.39 V12.29        Plan:       Cervical Cancer Screening - Pap refused     Breast Cancer Screening - Mammogram Up to date     Colon Cancer Screening - Colon cancer screening Up to date " "    Eye Exam - Recommend annually.    Dental Exam - Recommend biannual exams.     Vaccinations -   Immunization History   Administered Date(s) Administered    COVID-19, MRNA, LN-S, PF (MODERNA FULL 0.5 ML DOSE) 05/02/2022    COVID-19, mRNA, LNP-S, PF, iesha-sucrose, 30 mcg/0.3 mL (Pfizer 2023 Ages 12+) 10/11/2023    Influenza - Quadrivalent - PF *Preferred* (6 months and older) 12/08/2022, 10/11/2023    Tdap 02/01/2019    Zoster Recombinant 10/11/2023          1. Wellness examination  Wellness: Five Rules Reviewed: Water/Nutrition-Real Food, Limit Fast Food/ Exercise 20-30 minutes most days of the week/ Mental health- "Is your work a calling or paycheck" Define 'What is your purpose-what matters to you' Stress- Is an Individual Response- Therefore Can be Controlled by the Individual. Meditation/ Immunizations/Multivitamin use-Vitamin D3/ Screenings   - Mammo Digital Screening Bilat; Future    2. Essential hypertension  Patient has been taking medication-Norvasc 5 mg. Blood pressure goal of less than 130/80-blood pressure is stable. Continue to encourage diet and activity modifications. Including stress management. Pathophysiology/treatment/dangers discussed.     3. Mixed hyperlipidemia  Lab Results   Component Value Date    CHOL 152 09/22/2023    LDL 77.00 09/22/2023    TRIG 159 (H) 09/22/2023    HDL 43 09/22/2023     Pathophysiology/treatment/dangers discussed. Patient to continue diet modification/Lipitor 40 mg.   Total cholesterol 152 ( Goal less than 200) HDL 43 ( Goal high as possible) LDL 77 ( Goal less than 100) Triglycerides 159 ( Goal less than 150)   - CBC Auto Differential; Future  - Comprehensive Metabolic Panel; Future  - Lipid Panel; Future      4. Pre-diabetes  Hemoglobin A1c 6.1   Normal less than 5.7 - Diagnosis of Type 2 Diabetes Mellitus at 6.5. Encourage diet and activity modification- continue metformin - Pathophysiology/treatment/dangers discussed.   - Hemoglobin A1C; Future    5. Primary " osteoarthritis involving multiple joints  Patient is taking medication for osteoarthritis only has  needed.    Pathophysiology/treatment/dangers discussed at length.    Patient feels that the benefits of the medication outweighed the risk.    No noticeable side effects of medicine at this time.   - meloxicam (MOBIC) 15 MG tablet; Take 1 tablet (15 mg total) by mouth once daily.  Dispense: 90 tablet; Refill: 2    6. History of kidney stones  Patient is comanage with urologist-no acute modifications or recommendations at this time-continue to encourage diet and lifestyle modifications.    7. History of gout  Check studies management based upon results.  Pathophysiology/treatment/dangers discussed.   - Uric Acid; Future      The patient's Health Maintenance was reviewed and the following appears to be due at this time:   Health Maintenance Due   Topic Date Due    RSV Vaccine (Age 60+) (1 - 1-dose 60+ series) Never done    Mammogram  01/17/2024         Follow up in about 6 months (around 5/14/2024). In addition to their scheduled follow up, the patient has also been instructed to follow up on as needed basis.     Future Appointments   Date Time Provider Department Center   5/14/2024 11:45 AM Angela Baez MD [facility] [place]   11/22/2024 10:00 AM Angela Baez MD [facility] [place] Indira Gautam, MD

## 2023-11-16 ENCOUNTER — PATIENT MESSAGE (OUTPATIENT)
Dept: OTHER | Facility: OTHER | Age: 63
End: 2023-11-16
Payer: COMMERCIAL

## 2023-11-16 ENCOUNTER — PATIENT OUTREACH (OUTPATIENT)
Dept: OTHER | Facility: OTHER | Age: 63
End: 2023-11-16
Payer: COMMERCIAL

## 2023-11-16 NOTE — PROGRESS NOTES
Connected Back: Patient Outreach    (Week 11):    Exercises Deleted:   - 1/2 kneeling diagonal lift  - 1/2 kneeling chops      Exercises Added:  - Lao dead lift (RDL)  - Standing glute med with swiss ball

## 2023-11-24 ENCOUNTER — PATIENT MESSAGE (OUTPATIENT)
Dept: ADMINISTRATIVE | Facility: OTHER | Age: 63
End: 2023-11-24
Payer: COMMERCIAL

## 2023-11-27 ENCOUNTER — PATIENT OUTREACH (OUTPATIENT)
Dept: OTHER | Facility: OTHER | Age: 63
End: 2023-11-27
Payer: COMMERCIAL

## 2023-11-27 NOTE — PROGRESS NOTES
Connected Back: Patient Outreach         No data to display              Cleared escalation: patient staying on the same week for 1st time.

## 2023-12-03 ENCOUNTER — PATIENT MESSAGE (OUTPATIENT)
Dept: ADMINISTRATIVE | Facility: OTHER | Age: 63
End: 2023-12-03
Payer: COMMERCIAL

## 2023-12-05 ENCOUNTER — PATIENT OUTREACH (OUTPATIENT)
Dept: OTHER | Facility: OTHER | Age: 63
End: 2023-12-05
Payer: COMMERCIAL

## 2023-12-05 NOTE — PROGRESS NOTES
Connected Back: Patient Outreach    (Week 12):    Exercises Deleted:   - Plank with hip ext/abd  - Exercise ball plank - pot stir    Exercises Added:  - Bridge with heel walking  - Swiss ball marching

## 2023-12-13 ENCOUNTER — PATIENT OUTREACH (OUTPATIENT)
Dept: OTHER | Facility: OTHER | Age: 63
End: 2023-12-13
Payer: COMMERCIAL

## 2023-12-13 NOTE — PROGRESS NOTES
Connected Back: Patient Outreach    Exercise completion/Weekly QNR Inquiry - patient has thoroughly enjoyed the program and suggested. PT to create a neck program next.

## 2023-12-15 ENCOUNTER — PATIENT MESSAGE (OUTPATIENT)
Dept: ADMINISTRATIVE | Facility: OTHER | Age: 63
End: 2023-12-15
Payer: COMMERCIAL

## 2023-12-17 ENCOUNTER — PATIENT MESSAGE (OUTPATIENT)
Dept: ADMINISTRATIVE | Facility: OTHER | Age: 63
End: 2023-12-17
Payer: COMMERCIAL

## 2023-12-17 DIAGNOSIS — I10 ESSENTIAL HYPERTENSION: ICD-10-CM

## 2023-12-18 RX ORDER — AMLODIPINE BESYLATE 5 MG/1
5 TABLET ORAL DAILY
Qty: 90 TABLET | Refills: 3 | Status: SHIPPED | OUTPATIENT
Start: 2023-12-18 | End: 2024-02-21 | Stop reason: SDUPTHER

## 2023-12-19 ENCOUNTER — PATIENT OUTREACH (OUTPATIENT)
Dept: OTHER | Facility: OTHER | Age: 63
End: 2023-12-19
Payer: COMMERCIAL

## 2023-12-19 DIAGNOSIS — E78.2 MIXED HYPERLIPIDEMIA: Chronic | ICD-10-CM

## 2023-12-20 RX ORDER — ATORVASTATIN CALCIUM 40 MG/1
40 TABLET, FILM COATED ORAL DAILY
Qty: 90 TABLET | Refills: 1 | Status: SHIPPED | OUTPATIENT
Start: 2023-12-20 | End: 2024-12-19

## 2024-01-10 ENCOUNTER — PATIENT MESSAGE (OUTPATIENT)
Dept: OTHER | Facility: OTHER | Age: 64
End: 2024-01-10
Payer: COMMERCIAL

## 2024-01-10 ENCOUNTER — OFFICE VISIT (OUTPATIENT)
Dept: FAMILY MEDICINE | Facility: CLINIC | Age: 64
End: 2024-01-10
Payer: COMMERCIAL

## 2024-01-10 DIAGNOSIS — M54.12 CERVICAL RADICULOPATHY: Primary | ICD-10-CM

## 2024-01-10 PROCEDURE — 1160F RVW MEDS BY RX/DR IN RCRD: CPT | Mod: CPTII,95,, | Performed by: FAMILY MEDICINE

## 2024-01-10 PROCEDURE — 1159F MED LIST DOCD IN RCRD: CPT | Mod: CPTII,95,, | Performed by: FAMILY MEDICINE

## 2024-01-10 PROCEDURE — 99214 OFFICE O/P EST MOD 30 MIN: CPT | Mod: 95,,, | Performed by: FAMILY MEDICINE

## 2024-01-11 NOTE — PROGRESS NOTES
Patient ID: [id number]     Chief Complaint: No chief complaint on file.      HPI:     This is a telemedicine note. Patient was treated using telemedicine, real time audio and video, according to [facility] protocols. I, Angela Moreira MD, conducted the visit from the [facility] in [place], [place]. The patient participated in the visit at a non-[facility] location selected by the patient, identified below. I am licensed in the state where the patient stated they are located. The patient stated that they understood and accepted the privacy and security risks to their information at their location. This visit is not recorded.    Patient was located at the patient's home.     Nehal Araujo is a 63 y.o. female here today for a telemedicine visit.   Has been having increased problems with neck pain with radiation towards the shoulders-over time pain seems to increase in intensity-and occurring more frequently-with pain range of motion is not limited but is painful.  Patient has tried meloxicam with no significant improvement in pain.  Initially pain presented has a burning sensation with radiation towards the right side-now more painful towards the left side.      Past Medical History:   Diagnosis Date    History of gout 2022    History of kidney stones 2023    Lumbar degenerative disc disease 2023    Pre-diabetes 2022    Primary osteoarthritis of right knee 2023        Past Surgical History:   Procedure Laterality Date    ARTHROSCOPY,KNEE,WITH MENISCUS REPAIR Right 2023    Procedure: ARTHROSCOPY,KNEE,WITH MENISCUS REPAIR;  Surgeon: Shay Daniels Jr., MD;  Location: [facility];  Service: Orthopedics;  Laterality: Right;    BUNIONECTOMY Bilateral      SECTION      x 3    CHOLECYSTECTOMY      COLECTOMY      CYSTOURETEROSCOPY,WITH HOLMIUM LASER LITHOTRIPSY OF URETERAL CALCULUS N/A 2023    Procedure: CYSTOURETEROSCOPY,WITH HOLMIUM LASER LITHOTRIPSY OF URETERAL CALCULUS;   Surgeon: Isrrael Rachel MD;  Location: [facility] OR;  Service: Urology;  Laterality: N/A;    ENDOMETRIAL ABLATION      thumb surgery      TONSILLECTOMY      URETERAL STENT PLACEMENT Right 2023    Procedure: INSERTION, STENT, URETER;  Surgeon: Isrrael Rachel MD;  Location: [facility] OR;  Service: Urology;  Laterality: Right;        Social History     Tobacco Use    Smoking status: Former     Current packs/day: 0.00     Average packs/day: 0.8 packs/day for 15.0 years (11.3 ttl pk-yrs)     Types: Cigarettes     Start date:      Quit date:      Years since quittin.0    Smokeless tobacco: Never   Substance Use Topics    Alcohol use: Yes     Alcohol/week: 5.0 standard drinks of alcohol     Types: 5 Drinks containing 0.5 oz of alcohol per week     Comment: sociallly    Drug use: Never        Social History     Socioeconomic History    Marital status:      Spouse name: Hardik    Number of children: 3        Current Outpatient Medications   Medication Instructions    amLODIPine (NORVASC) 5 mg, Oral, Daily    atorvastatin (LIPITOR) 40 mg, Oral, Daily    blood sugar diagnostic (ONETOUCH VERIO TEST STRIPS) Strp 50 each, Misc.(Non-Drug; Combo Route), 2 times daily, Check glucose levels twice a day    febuxostat (ULORIC) 40 mg Tab 1 tablet, Oral, Daily    lancets (ONETOUCH DELICA PLUS LANCET) 33 gauge Misc 100 lancets, Misc.(Non-Drug; Combo Route), 2 times daily, Check blood glucose levels twice a day    magnesium oxide (MAG-OX) 400 mg (241.3 mg magnesium) tablet 1 tablet, Oral    meloxicam (MOBIC) 15 mg, Oral, Daily    metFORMIN (GLUCOPHAGE) 500 mg, Oral, With breakfast    omeprazole (PRILOSEC) 20 mg, Oral, Daily    sodium bicarbonate 650 MG tablet 1 tablet, Oral, 3 times daily    triamcinolone (NASACORT) 55 mcg nasal inhaler 2 sprays, Each Nostril, Daily       Review of patient's allergies indicates:   Allergen Reactions    Allopurinol analogues Rash    Hydrochlorothiazide Palpitations    Percocet  [oxycodone-acetaminophen] Itching        Patient Care Team:  Angela Baez MD as PCP - General (Family Medicine)  Sandy Gloria as   Isrrael Rachel MD as Consulting Physician (Urology)  Urology-Referrals, Tavo Tadeo MD as Consulting Physician (Rheumatology)  Angela Joyce MD (Dermatology)     Subjective:     Review of Systems    12 point review of systems conducted, negative except as stated in the history of present illness. See HPI for details.    Objective:     There were no vitals taken for this visit.    Physical Exam    Physical Exam: LIMITED DUE TO TELEMEDICINE RESTRICTIONS.  General: Alert and oriented, No acute distress.  Head: Normocephalic, Atraumatic.  Eye: Sclera non-icteric.  Neck/Thyroid:  Full range of motion.  Respiratory: Non-labored respirations, Symmetrical chest wall expansion.  Musculoskeletal: Normal range of motion, muscle strength intact.  Integumentary:  No visible suspicious lesions or rashes. No diaphoresis.   Neurologic: No focal deficits  Psychiatric: Normal interaction, Coherent speech, Euthymic mood, Appropriate affect       Assessment:       ICD-10-CM ICD-9-CM   1. Cervical radiculopathy  M54.12 723.4        Plan:     1. Cervical radiculopathy  Pathophysiology/Treatment/ Dangers Discussed.  Treatment options include anti-inflammatory/physical therapy-obtain MRI of the cervical spine.   - MRI Cervical Spine Without Contrast; Future  - Ambulatory referral/consult to Physical/Occupational Therapy; Future    Follow up if symptoms worsen or fail to improve. In addition to their scheduled follow up, the patient has also been instructed to follow up on as needed basis.     Future Appointments   Date Time Provider Department Center   1/22/2024  1:00 PM [facility] MAMMO1 SCREEN [facility] MAMMO [facility]   5/14/2024 11:45 AM Angela Baez MD [facility] [place]   11/22/2024 10:00 AM Angela Baez MD [facility] [place]        Video Time  Documentation:  Spent 10 minutes with patient face to face discussed health concerns. More than 50% of this time was spent in counseling and coordination of care.    Angela Baez MD

## 2024-01-12 ENCOUNTER — TELEPHONE (OUTPATIENT)
Dept: FAMILY MEDICINE | Facility: CLINIC | Age: 64
End: 2024-01-12
Payer: COMMERCIAL

## 2024-01-12 DIAGNOSIS — M54.12 CERVICAL RADICULOPATHY: Primary | ICD-10-CM

## 2024-01-12 RX ORDER — TRAMADOL HYDROCHLORIDE 50 MG/1
50 TABLET ORAL
Qty: 30 TABLET | Refills: 0 | Status: SHIPPED | OUTPATIENT
Start: 2024-01-12 | End: 2024-02-11

## 2024-01-12 NOTE — TELEPHONE ENCOUNTER
Patient called stating that at her appointment she informed you that she had Tramadol from a previous time, but she doesn't. She would like a prescription called in if possible

## 2024-01-19 ENCOUNTER — HOSPITAL ENCOUNTER (OUTPATIENT)
Dept: RADIOLOGY | Facility: HOSPITAL | Age: 64
Discharge: HOME | End: 2024-01-19
Attending: FAMILY MEDICINE
Payer: COMMERCIAL

## 2024-01-19 DIAGNOSIS — M54.12 CERVICAL RADICULOPATHY: Primary | ICD-10-CM

## 2024-01-19 DIAGNOSIS — M54.12 CERVICAL RADICULOPATHY: ICD-10-CM

## 2024-01-19 PROCEDURE — 72141 MRI NECK SPINE W/O DYE: CPT | Mod: TC

## 2024-01-19 NOTE — PROGRESS NOTES
Patient called results of MRI discussed-patient feels that physical therapy is actually making the pain worse-referral to neurosurgeon.

## 2024-01-22 ENCOUNTER — HOSPITAL ENCOUNTER (OUTPATIENT)
Dept: RADIOLOGY | Facility: HOSPITAL | Age: 64
Discharge: HOME | End: 2024-01-22
Attending: FAMILY MEDICINE
Payer: COMMERCIAL

## 2024-01-22 DIAGNOSIS — Z00.00 WELLNESS EXAMINATION: ICD-10-CM

## 2024-01-22 PROCEDURE — 77067 SCR MAMMO BI INCL CAD: CPT | Mod: TC

## 2024-01-22 PROCEDURE — 77063 BREAST TOMOSYNTHESIS BI: CPT | Mod: 26,,, | Performed by: RADIOLOGY

## 2024-01-22 PROCEDURE — 77067 SCR MAMMO BI INCL CAD: CPT | Mod: 26,,, | Performed by: RADIOLOGY

## 2024-01-31 ENCOUNTER — TELEPHONE (OUTPATIENT)
Dept: FAMILY MEDICINE | Facility: CLINIC | Age: 64
End: 2024-01-31
Payer: COMMERCIAL

## 2024-01-31 DIAGNOSIS — M51.36 LUMBAR DEGENERATIVE DISC DISEASE: Primary | Chronic | ICD-10-CM

## 2024-01-31 RX ORDER — IBUPROFEN 800 MG/1
800 TABLET ORAL 3 TIMES DAILY
Qty: 90 TABLET | Refills: 0 | Status: SHIPPED | OUTPATIENT
Start: 2024-01-31 | End: 2024-03-01

## 2024-01-31 NOTE — PROGRESS NOTES
Per patient's request ibuprofen sent to pharmacy.  Risks associated with anti-inflammatory has been discussed with the patient in previous visit.

## 2024-01-31 NOTE — TELEPHONE ENCOUNTER
Patient called stating that she does not like the Tramadol and would like to know if Ibuprofen can be sent to pharmacy instead. She is requesting that it be called in for three times a day as needed if possible

## 2024-02-18 DIAGNOSIS — R73.03 PRE-DIABETES: ICD-10-CM

## 2024-02-18 DIAGNOSIS — K21.9 GASTROESOPHAGEAL REFLUX DISEASE, UNSPECIFIED WHETHER ESOPHAGITIS PRESENT: ICD-10-CM

## 2024-02-19 RX ORDER — OMEPRAZOLE 20 MG/1
20 CAPSULE, DELAYED RELEASE ORAL DAILY
Qty: 30 CAPSULE | Refills: 3 | Status: SHIPPED | OUTPATIENT
Start: 2024-02-19

## 2024-02-19 RX ORDER — METFORMIN HYDROCHLORIDE 500 MG/1
500 TABLET ORAL
Qty: 90 TABLET | Refills: 3 | Status: SHIPPED | OUTPATIENT
Start: 2023-08-14 | End: 2024-08-22

## 2024-02-20 ENCOUNTER — PATIENT MESSAGE (OUTPATIENT)
Dept: FAMILY MEDICINE | Facility: CLINIC | Age: 64
End: 2024-02-20
Payer: COMMERCIAL

## 2024-02-21 DIAGNOSIS — M10.09 ACUTE IDIOPATHIC GOUT OF MULTIPLE SITES: Primary | ICD-10-CM

## 2024-02-21 DIAGNOSIS — I10 ESSENTIAL HYPERTENSION: ICD-10-CM

## 2024-02-21 DIAGNOSIS — K21.9 GASTROESOPHAGEAL REFLUX DISEASE, UNSPECIFIED WHETHER ESOPHAGITIS PRESENT: ICD-10-CM

## 2024-02-21 RX ORDER — OMEPRAZOLE 20 MG/1
20 CAPSULE, DELAYED RELEASE ORAL DAILY
Qty: 30 CAPSULE | Refills: 3 | Status: CANCELLED | OUTPATIENT
Start: 2024-02-21

## 2024-02-21 RX ORDER — COLCHICINE 0.6 MG/1
0.6 TABLET ORAL DAILY
Qty: 30 TABLET | Refills: 1 | Status: SHIPPED | OUTPATIENT
Start: 2024-02-21 | End: 2025-02-20

## 2024-02-21 RX ORDER — AMLODIPINE BESYLATE 5 MG/1
5 TABLET ORAL DAILY
Qty: 90 TABLET | Refills: 3 | Status: SHIPPED | OUTPATIENT
Start: 2024-02-21 | End: 2025-02-20

## 2024-02-27 ENCOUNTER — TELEPHONE (OUTPATIENT)
Dept: NEUROSURGERY | Facility: CLINIC | Age: 64
End: 2024-02-27
Payer: COMMERCIAL

## 2024-02-27 NOTE — TELEPHONE ENCOUNTER
Patient was originally ref to Dr Mcclellan but after waiting over a month to see him they informed her that he is out of network for her. Ref sent to us on 2/27/24 by Angela Baez MD for cervical radiculopathy. MRI on 1/11/24 in chart. Patient stated that neck pain started approx 6 months ago and now has numbness/tingling in left arm. Patient stated pain wakes her night and has tried taking Meloxicam and Tramadol - neither work. Patient stated she is taking Ibuprofen 800mg BID. Patient denies any previous spine surgery.     Patient scheduled w/MM 3/12 @ 2pm.

## 2024-02-29 ENCOUNTER — DOCUMENTATION ONLY (OUTPATIENT)
Dept: ADMINISTRATIVE | Facility: HOSPITAL | Age: 64
End: 2024-02-29
Payer: COMMERCIAL

## 2024-03-12 ENCOUNTER — HOSPITAL ENCOUNTER (OUTPATIENT)
Dept: RADIOLOGY | Facility: HOSPITAL | Age: 64
Discharge: HOME | End: 2024-03-12
Attending: PHYSICIAN ASSISTANT
Payer: COMMERCIAL

## 2024-03-12 ENCOUNTER — OFFICE VISIT (OUTPATIENT)
Dept: NEUROSURGERY | Facility: CLINIC | Age: 64
End: 2024-03-12
Payer: COMMERCIAL

## 2024-03-12 VITALS
SYSTOLIC BLOOD PRESSURE: 136 MMHG | BODY MASS INDEX: 31.54 KG/M2 | HEIGHT: 63 IN | HEART RATE: 91 BPM | DIASTOLIC BLOOD PRESSURE: 86 MMHG | RESPIRATION RATE: 16 BRPM | WEIGHT: 178 LBS

## 2024-03-12 DIAGNOSIS — M47.22 CERVICAL SPONDYLOSIS WITH RADICULOPATHY: Primary | ICD-10-CM

## 2024-03-12 DIAGNOSIS — M54.12 CERVICAL RADICULOPATHY: ICD-10-CM

## 2024-03-12 DIAGNOSIS — M47.22 CERVICAL SPONDYLOSIS WITH RADICULOPATHY: ICD-10-CM

## 2024-03-12 PROCEDURE — 1160F RVW MEDS BY RX/DR IN RCRD: CPT | Mod: CPTII,,, | Performed by: PHYSICIAN ASSISTANT

## 2024-03-12 PROCEDURE — 99204 OFFICE O/P NEW MOD 45 MIN: CPT | Mod: ,,, | Performed by: PHYSICIAN ASSISTANT

## 2024-03-12 PROCEDURE — 1159F MED LIST DOCD IN RCRD: CPT | Mod: CPTII,,, | Performed by: PHYSICIAN ASSISTANT

## 2024-03-12 PROCEDURE — 3079F DIAST BP 80-89 MM HG: CPT | Mod: CPTII,,, | Performed by: PHYSICIAN ASSISTANT

## 2024-03-12 PROCEDURE — 72052 X-RAY EXAM NECK SPINE 6/>VWS: CPT | Mod: TC

## 2024-03-12 PROCEDURE — 3075F SYST BP GE 130 - 139MM HG: CPT | Mod: CPTII,,, | Performed by: PHYSICIAN ASSISTANT

## 2024-03-12 PROCEDURE — 3008F BODY MASS INDEX DOCD: CPT | Mod: CPTII,,, | Performed by: PHYSICIAN ASSISTANT

## 2024-03-12 RX ORDER — GABAPENTIN 300 MG/1
300 CAPSULE ORAL 3 TIMES DAILY
Qty: 90 CAPSULE | Refills: 2 | Status: SHIPPED | OUTPATIENT
Start: 2024-03-12 | End: 2025-03-12

## 2024-03-12 NOTE — PATIENT INSTRUCTIONS
Gabapentin 300mg 1 capsule at bedtime for 5-7 days, then 1 capsule two times daily for 5-6 days, then 1 capsule three times daily.

## 2024-03-12 NOTE — PROGRESS NOTES
[facility]  History & Physical  Neurosurgery      Nehal Araujo   [id number]   1960       SUBJECTIVE:     CHIEF COMPLAINT:  Neck pain      HPI:  Nehal Araujo is a 63 y.o. female who presents for neurosurgical evaluation.  The patient was referred to Dr. Jj by Dr. Angela Baez/  she complains of neck pain with pain that radiates into bilateral trapezius muscles, and upper scapular region she describes a pins and needle type feeling through the left trapezius muscle, lateral upper arm, dorsal forearm, and into all fingertips.  She feels as though she will dropped objects from her left hand.  Her pain is increased when turning over in bed.  Standing causes increased tingling in the arm.  There is an increase in neck pain with range of motion.  She describes the numbness as a feeling as though her foot is falling asleep.  Exercises at home have increased her symptoms.  Pain is improved with sitting and relaxing.  The patient denies disturbances in bowel or bladder function.  There is no difficulty with balance.       Past Medical History:   Diagnosis Date    Cervical radiculopathy     GERD (gastroesophageal reflux disease)     History of gout 2022    History of kidney stones 2023    HTN (hypertension)     Lumbar degenerative disc disease 2023    Pre-diabetes 2022    Primary osteoarthritis of right knee 2023       Past Surgical History:   Procedure Laterality Date    ARTHROSCOPY,KNEE,WITH MENISCUS REPAIR Right 2023    Procedure: ARTHROSCOPY,KNEE,WITH MENISCUS REPAIR;  Surgeon: Shay Daniels Jr., MD;  Location: [facility];  Service: Orthopedics;  Laterality: Right;    BUNIONECTOMY Bilateral      SECTION      x 3    CHOLECYSTECTOMY      CYSTOURETEROSCOPY,WITH HOLMIUM LASER LITHOTRIPSY OF URETERAL CALCULUS N/A 2023    Procedure: CYSTOURETEROSCOPY,WITH HOLMIUM LASER LITHOTRIPSY OF URETERAL CALCULUS;  Surgeon: Isrrael Racehl MD;  Location: [facility] OR;   Service: Urology;  Laterality: N/A;    ENDOMETRIAL ABLATION      LAPAROSCOPIC PARTIAL COLECTOMY      thumb surgery Left     carpal - metacarpal joint replacement    TONSILLECTOMY      URETERAL STENT PLACEMENT Right 2023    Procedure: INSERTION, STENT, URETER;  Surgeon: Isrrael Rachel MD;  Location: [facility];  Service: Urology;  Laterality: Right;       Family History   Problem Relation Age of Onset    Diabetes Mother     Breast cancer Mother     Heart disease Mother     Stroke Mother     No Known Problems Father        Social History     Socioeconomic History    Marital status:      Spouse name: Hardik    Number of children: 3   Occupational History    Occupation: Retired: Health Care   Tobacco Use    Smoking status: Former     Current packs/day: 0.00     Average packs/day: 0.8 packs/day for 15.0 years (11.3 ttl pk-yrs)     Types: Cigarettes     Start date:      Quit date:      Years since quittin.2    Smokeless tobacco: Never   Substance and Sexual Activity    Alcohol use: Yes     Alcohol/week: 5.0 standard drinks of alcohol     Types: 5 Drinks containing 0.5 oz of alcohol per week     Comment: sociallly    Drug use: Never    Sexual activity: Yes     Social Determinants of Health     Financial Resource Strain: Patient Declined (8/10/2023)    Overall Financial Resource Strain (CARDIA)     Difficulty of Paying Living Expenses: Patient declined   Food Insecurity: Patient Declined (8/10/2023)    Hunger Vital Sign     Worried About Running Out of Food in the Last Year: Patient declined     Ran Out of Food in the Last Year: Patient declined   Transportation Needs: Patient Declined (8/10/2023)    PRAPARE - Transportation     Lack of Transportation (Medical): Patient declined     Lack of Transportation (Non-Medical): Patient declined   Physical Activity: Patient Declined (8/10/2023)    Exercise Vital Sign     Days of Exercise per Week: Patient declined     Minutes of Exercise per Session:  Patient declined   Stress: Patient Declined (8/10/2023)    [employer] [place] [employer] - Occupational Stress Questionnaire     Feeling of Stress : Patient declined   Social Connections: Patient Declined (8/10/2023)    Social Connection and Isolation Panel [NHANES]     Frequency of Communication with Friends and Family: Patient declined     Frequency of Social Gatherings with Friends and Family: Patient declined     Attends [employer] Services: Patient declined     Active Member of Clubs or Organizations: Patient declined     Attends Club or Organization Meetings: Patient declined     Marital Status: Patient declined   Housing Stability: Unknown (8/10/2023)    Housing Stability Vital Sign     Unable to Pay for Housing in the Last Year: Patient refused     Unstable Housing in the Last Year: Patient refused        Review of patient's allergies indicates:   Allergen Reactions    Allopurinol analogues Rash    Hydrochlorothiazide Palpitations    Percocet [oxycodone-acetaminophen] Itching        Current Outpatient Medications   Medication Instructions    amLODIPine (NORVASC) 5 mg, Oral, Daily    atorvastatin (LIPITOR) 40 mg, Oral, Daily    blood sugar diagnostic (ONETOUCH VERIO TEST STRIPS) Strp 50 each, Misc.(Non-Drug; Combo Route), 2 times daily, Check glucose levels twice a day    blood sugar diagnostic Strp Use to check blood glucose levels 2 times daily    colchicine (COLCRYS) 0.6 mg, Oral, Daily    febuxostat (ULORIC) 40 mg Tab 1 tablet, Oral, Daily    gabapentin (NEURONTIN) 300 mg, Oral, 3 times daily    lancets (ONETOUCH DELICA PLUS LANCET) 33 gauge Misc Use to check blood glucose levels twice daily    magnesium oxide (MAG-OX) 400 mg (241.3 mg magnesium) tablet Take 1 tablet by mouth once daily    meloxicam (MOBIC) 15 mg, Oral, Daily    metFORMIN (GLUCOPHAGE) 500 mg, Oral, With breakfast    multivit with calcium,iron,min (WOMEN'S MULTIPLE VITAMINS ORAL) Oral    omeprazole (PRILOSEC) 20 mg, Oral, Daily  "   sodium bicarbonate 650 MG tablet 1 tablet, Oral, 3 times daily    triamcinolone (NASACORT) 55 mcg nasal inhaler 2 sprays, Each Nostril, Daily        Review of Systems   Constitutional:  Negative for chills and fever.   HENT:  Negative for nosebleeds and sore throat.    Eyes:  Negative for pain and visual disturbance.   Respiratory:  Negative for cough, chest tightness and shortness of breath.    Cardiovascular:  Negative for chest pain.   Gastrointestinal:  Negative for diarrhea, nausea and vomiting.   Genitourinary:  Negative for difficulty urinating, dysuria and hematuria.   Musculoskeletal:  Positive for back pain and neck pain. Negative for gait problem and myalgias.   Skin:  Negative for rash.   Neurological:  Positive for weakness and numbness. Negative for dizziness, facial asymmetry and headaches.   Psychiatric/Behavioral:  Negative for confusion and sleep disturbance. The patient is not nervous/anxious.        OBJECTIVE:       Visit Vitals  /86 (BP Location: Right arm, Patient Position: Sitting)   Pulse 91   Resp 16   Ht 5' 3" (1.6 m)   Wt 80.7 kg (178 lb)   BMI 31.53 kg/m²        General:  Pleasant, Well-groomed, overweight.    CV:  Neck is supple.  There are no carotid bruits.  Heart has regular rate and rhythm.    Lungs:  Lungs are clear to auscultation bilaterally with non-labored respirations.    Abdomen:  Soft, non-tender, non-distended.    Musculoskeletal:  Cervical ROM:  Moderately limited with extension and bilateral rotation.  Neck pain is increased with flexion.  Tinel's is negative at bilateral wrist and elbows.  Spurling's maneuver is positive on the left causing pain into the left arm, negative on the right.    Neurological:    Alert and oriented to person, place, time, and situation.  Muscle strength against resistance:  Strength  Deltoids Triceps Biceps Wrist Extension Wrist Flexion Intrinsics   Upper: R 5/5 5/5 5/5 5/5  5/5    L 5/5 5/5 5/5 5/5  5/5   Sensation is intact to primary " modalities in bilateral upper extremities with the exception of being diminished along bilateral C6 dermatomes.  Reflexes:   Right Left   Triceps 1+ 0   Biceps 1+ 0   Brachioradialis 1+ 0   Patellar     Achilles     Kiser's sign is negative bilaterally.  Gait is normal.   Coordination is normal.      Imaging:  All pertinent neuroimaging independently reviewed. Discussed these findings in detail with the patient.  MRI of the cervical spine was obtained on 01/19/2024.  This study shows multilevel disc degeneration and spondylosis.  At C3-4, there is severe right foraminal stenosis secondary to disc/osteophyte complex with uncinate and facet hypertrophy.  At C4-5, there is mild left foraminal stenosis secondary to uncinate and facet hypertrophy.  At C5-6, there is disc/osteophyte complex along with uncinate and facet hypertrophy which causes mild-to-moderate central and severe left foraminal stenosis.  At C6-7, there is disc/osteophyte complex along with uncinate and facet hypertrophy that causes mild-to-moderate central and moderate left foraminal stenosis.   Cervical x-rays were obtained on 03/12/2024 after the visit.  There is proximally 2 to 3 mm of anterolisthesis of C3 on 4 and up to 4 mm anterolisthesis of C4 on 5.  This reduces at both levels with extension.  There is advanced disc degeneration at C5-6 greater than C6-7.      ASSESSMENT:     1. Cervical spondylosis with radiculopathy  Ambulatory referral/consult to Physical/Occupational Therapy    gabapentin (NEURONTIN) 300 MG capsule    X-Ray Cervical Spine 5 View W Flex Extxt      2. Cervical radiculopathy  Ambulatory referral/consult to Neurosurgery        PLAN:     Options were discussed at length with the patient.  She has not maximized conservative measures to this point in time.  She will undergo a course of physical therapy.  We also discussed home cervical traction.  She will try the traction at therapy first then look into obtaining a home unit.  We  will have her return for follow-up to see Dr. Jj after therapy.  She was also provided a prescription for gabapentin with instructions to increase dosage if needed.      A total of 29 minutes was spent face-to-face with the patient during this encounter.  Over half of that time was spent on counseling and coordination of care. An additional 10+ minutes were used to reviewed the patient's chart including notes from Dr. Baez, cervical MRI images and report, cervical x-ray images, and work on office note.      Mildred Hancock PA-C      Disclaimer:  This note is prepared using voice recognition software and as such is likely to have errors despite attempts at proofreading.

## 2024-03-21 ENCOUNTER — PATIENT MESSAGE (OUTPATIENT)
Dept: ADMINISTRATIVE | Facility: OTHER | Age: 64
End: 2024-03-21
Payer: COMMERCIAL

## 2024-03-24 ENCOUNTER — HOSPITAL ENCOUNTER (EMERGENCY)
Facility: HOSPITAL | Age: 64
Discharge: HOME | End: 2024-03-24
Attending: FAMILY MEDICINE
Payer: COMMERCIAL

## 2024-03-24 VITALS
RESPIRATION RATE: 18 BRPM | OXYGEN SATURATION: 97 % | HEART RATE: 90 BPM | SYSTOLIC BLOOD PRESSURE: 150 MMHG | TEMPERATURE: 97 F | BODY MASS INDEX: 31.01 KG/M2 | DIASTOLIC BLOOD PRESSURE: 80 MMHG | HEIGHT: 63 IN | WEIGHT: 175 LBS

## 2024-03-24 DIAGNOSIS — M79.605 LEG PAIN, INFERIOR, LEFT: Primary | ICD-10-CM

## 2024-03-24 PROCEDURE — 99284 EMERGENCY DEPT VISIT MOD MDM: CPT | Mod: 25

## 2024-03-24 RX ORDER — CYCLOBENZAPRINE HCL 5 MG
5 TABLET ORAL 2 TIMES DAILY PRN
Qty: 15 TABLET | Refills: 0 | Status: SHIPPED | OUTPATIENT
Start: 2024-03-24

## 2024-03-24 NOTE — ED NOTES
Pt ambulated to room 1 w/steady gait. Pt c/o left calf pain that started this morning and went away but happened again twice before arriving to ED. Pt states she became concerned when she noticed her left calf was red. Skin was not hot to touch and intact. No personal heart hx per pt.

## 2024-03-24 NOTE — ED PROVIDER NOTES
Encounter Date: 3/24/2024       History     Chief Complaint   Patient presents with    Leg Pain     Left calf pain.  Reports feeling a shooting pain down left calf at 12.  Redness noted to site.       HPI  Review of patient's allergies indicates:   Allergen Reactions    Allopurinol analogues Rash    Hydrochlorothiazide Palpitations    Percocet [oxycodone-acetaminophen] Itching     Past Medical History:   Diagnosis Date    Actinic keratosis     Acute idiopathic gout of multiple sites     Cervical radiculopathy     GERD (gastroesophageal reflux disease)     Hemangioma of skin and subcutaneous tissue     History of gout 2022    History of kidney stones 2023    Lumbar degenerative disc disease 2023    Pre-diabetes 2022    Primary osteoarthritis of right knee 2023     Past Surgical History:   Procedure Laterality Date    ARTHROSCOPY,KNEE,WITH MENISCUS REPAIR Right 2023    Procedure: ARTHROSCOPY,KNEE,WITH MENISCUS REPAIR;  Surgeon: Shay Daniels Jr., MD;  Location: [facility];  Service: Orthopedics;  Laterality: Right;    BUNIONECTOMY Bilateral      SECTION      x 3    CHOLECYSTECTOMY      CYSTOURETEROSCOPY,WITH HOLMIUM LASER LITHOTRIPSY OF URETERAL CALCULUS N/A 2023    Procedure: CYSTOURETEROSCOPY,WITH HOLMIUM LASER LITHOTRIPSY OF URETERAL CALCULUS;  Surgeon: Isrrael Rachel MD;  Location: [facility];  Service: Urology;  Laterality: N/A;    ENDOMETRIAL ABLATION      LAPAROSCOPIC PARTIAL COLECTOMY      thumb surgery Left     carpal - metacarpal joint replacement    TONSILLECTOMY      URETERAL STENT PLACEMENT Right 2023    Procedure: INSERTION, STENT, URETER;  Surgeon: Isrrael Rachel MD;  Location: [facility];  Service: Urology;  Laterality: Right;     Family History   Problem Relation Name Age of Onset    Diabetes Mother      Breast cancer Mother      Heart disease Mother      Stroke Mother      No Known Problems Father       Social History     Tobacco Use    Smoking  status: Former     Current packs/day: 0.00     Average packs/day: 0.8 packs/day for 15.0 years (11.3 ttl pk-yrs)     Types: Cigarettes     Start date:      Quit date: 2015     Years since quittin.3    Smokeless tobacco: Never   Substance Use Topics    Alcohol use: Yes     Alcohol/week: 5.0 standard drinks of alcohol     Types: 5 Drinks containing 0.5 oz of alcohol per week     Comment: sociallly    Drug use: Never     Review of Systems    Physical Exam     Initial Vitals [24 1359]   BP Pulse Resp Temp SpO2   (!) 178/93 98 18 97.2 °F (36.2 °C) 95 %      MAP       --         Physical Exam    Nursing note and vitals reviewed.  Constitutional: She appears well-developed and well-nourished. No distress.   Neck: Neck supple.   Normal range of motion.  Cardiovascular:  Normal rate and regular rhythm.           Pulmonary/Chest: Breath sounds normal.   Abdominal: Abdomen is soft. Bowel sounds are normal. There is no abdominal tenderness.   Musculoskeletal:         General: No edema.      Cervical back: Normal range of motion and neck supple.      Comments: Mild left lower leg tender, no edema noted. Distal cap refill <3 sec and sensation is intact.     Neurological: She is alert and oriented to person, place, and time. GCS score is 15. GCS eye subscore is 4. GCS verbal subscore is 5. GCS motor subscore is 6.   Skin: Skin is warm and dry.   Psychiatric: She has a normal mood and affect.         ED Course   Procedures  Labs Reviewed - No data to display       Imaging Results              US Lower Extremity Veins Left (Final result)  Result time 24 15:23:13      Final result by Yobani Hernandez MD (24 15:23:13)                   Impression:      No deep venous thrombosis in the left lower extremity.      Electronically signed by: Yobani Hernandez  Date:    2024  Time:    15:23               Narrative:    EXAMINATION:  US LOWER EXTREMITY VEINS LEFT    CLINICAL HISTORY:  Pain in left  leg    TECHNIQUE:  Duplex and color flow Doppler evaluation of the major left lower extremity veins.    COMPARISON:  None    FINDINGS:  The left common femoral, femoral and popliteal veins are compressible. No thrombus seen in these vessels on gray-scale and color Doppler evaluation. The left posterior tibial, anterior tibial and peroneal veins are patent with no thrombus identified.                                       Medications - No data to display  Medical Decision Making  Ultrasound negative, assume muscle strain.  Will prescribe some muscle relaxants.    Risk  Prescription drug management.                                      Clinical Impression:  Final diagnoses:  [M79.605] Leg pain, inferior, left (Primary)          ED Disposition Condition    Discharge Stable          ED Prescriptions       Medication Sig Dispense Start Date End Date Auth. Provider    cyclobenzaprine (FLEXERIL) 5 MG tablet Take 1 tablet (5 mg total) by mouth 2 (two) times daily as needed for Muscle spasms. 15 tablet 3/24/2024 -- Jonah Lawler MD          Follow-up Information       Follow up With Specialties Details Why Contact Info    Angela Baez MD Family Medicine Call  As needed [address] [place] [id number]  [phone]               Jonah Lawler MD  03/24/24 1287       Jonah Lawler MD  05/20/24 9010

## 2024-05-01 ENCOUNTER — PATIENT OUTREACH (OUTPATIENT)
Dept: ADMINISTRATIVE | Facility: HOSPITAL | Age: 64
End: 2024-05-01
Payer: COMMERCIAL

## 2024-05-01 NOTE — LETTER
Dear Dee [facility] is committed to your overall health. Periodically we review the health information in your chart to make sure you are up to date on all of your recommended tests and/or procedures.       Our review of your chart shows that you may be due for       Colon Cancer Screening    Shingles/Zoster Vaccine  RSV Vaccine        If you have had any of the above done at another facility, please let us know and we will request a copy of the report to update your [facility] record.       At your convenience I would like to speak to you to help get these items scheduled (if needed) and also see if there is anything else we can do to help you. Please send me a message via your patient portal or give me a call at [phone].  I am looking forward to speaking with you soon.     Sincerely,      ALICIA Rosales Care Coordinator  Angela Baez MD and your [facility] Team

## 2024-05-01 NOTE — LETTER
"  This communication is flagged as high priority.        AUTHORIZATION FOR RELEASE OF   CONFIDENTIAL INFORMATION    Dear Medical Records, [phone]    We are seeing Nehal Araujo, date of birth 1960, in the clinic at [facility]. Angela Baez MD is the patient's PCP. Nehal Araujo has an outstanding lab/procedure at the time we reviewed her chart. In order to help keep her health information updated, she has authorized us to request the following medical record(s):        (  )  MAMMOGRAM                                      (xx  )  COLONOSCOPY      (  )  PAP SMEAR                                          (  )  OUTSIDE LAB RESULTS     (  )  DEXA SCAN                                          (  )  EYE EXAM            (  )  FOOT EXAM                                          (  )  ENTIRE RECORD     (  )  OUTSIDE IMMUNIZATIONS                 (  )  _______________         Please fax records to [facility], Gautam, Indira, MD,  [phone]  Attn:       If you have any questions, please contact Tucker Gastelum" [facility] Coordinator @ [phone]    Patient Name: Nehal Araujo  : 1960  Patient Phone #: [phone]     "

## 2024-05-01 NOTE — PROGRESS NOTES
Health Maintenance Topic(s) Outreach Outcomes & Actions Taken:  Call to patient no answer, need to address the following.    Colorectal Cancer Screening - Outreach Outcomes & Actions Taken  : Per Donna at [facility] , patient cancelled Colonoscopy 4/10/23 and a no show 5/20/23    Blood Pressure - Outreach Outcomes & Actions Taken  : BP elevated will assess for remote  BP     Additional Notes:           Care Management, Digital Medicine, and/or Education Referrals      Former smoker SDOH, low risk last reviewed 3/24/24

## 2024-05-02 NOTE — PROGRESS NOTES
Spoke with patient, had a Colonoscopy in [facility]  [phone]  Fax #  [phone]  States will have her labs done in [place] prior to PCP visit.  Stated she will call with remote BP , will verify Norvasc dose.

## 2024-05-04 PROBLEM — I10 ESSENTIAL HYPERTENSION: Status: ACTIVE | Noted: 2024-05-04

## 2024-05-04 PROBLEM — M17.11 PRIMARY OSTEOARTHRITIS OF RIGHT KNEE: Status: ACTIVE | Noted: 2023-02-07

## 2024-05-28 NOTE — PROGRESS NOTES
Patient ID: [id number]     Chief Complaint: Pre-diabetes      HPI:     Nehal Araujo is a 63 y.o. female here today for follow up:   Has recently been dealing with multiple stressors.  Stressors and coping mechanisms discussed.  Has had significant problems with concentration-son is currently going through the process of being evaluated for ADD-patient would like referral for psychiatric evaluation.  Is also frustrated about inability to lose weight despite diet and lifestyle modifications-would like to discuss injections that are available to help with weight loss.  1) Hypertension: Patient has been taking medicine daily. BP is not regularly monitored at home. No symptoms associated with increased BP such as headache/ visual changes/ dizziness/ chest pain.   2) Reflux: Patient is continuing to take medication-no symptoms associated with reflux.  No noticeable side effects of medication.  3)  Gout: Patient has been stable-does want medication to be refilled.  4) Prediabetic: Patient is taking metformin in order to help prevent diabetes-no side effects of medicine noticeable      Past Medical History:   Diagnosis Date    Acute idiopathic gout of multiple sites     GERD (gastroesophageal reflux disease)     History of kidney stones 2023    Lumbar degenerative disc disease 2023    Pre-diabetes 2022    Primary osteoarthritis of right knee 2023        Past Surgical History:   Procedure Laterality Date    ARTHROSCOPY,KNEE,WITH MENISCUS REPAIR Right 2023    Procedure: ARTHROSCOPY,KNEE,WITH MENISCUS REPAIR;  Surgeon: Shay Daniels Jr., MD;  Location: [facility];  Service: Orthopedics;  Laterality: Right;    BUNIONECTOMY Bilateral      SECTION      x 3    CHOLECYSTECTOMY      CYSTOURETEROSCOPY,WITH HOLMIUM LASER LITHOTRIPSY OF URETERAL CALCULUS N/A 2023    Procedure: CYSTOURETEROSCOPY,WITH HOLMIUM LASER LITHOTRIPSY OF URETERAL CALCULUS;  Surgeon: Isrrael Rachel MD;  Location:  [facility] OR;  Service: Urology;  Laterality: N/A;    ENDOMETRIAL ABLATION      LAPAROSCOPIC PARTIAL COLECTOMY      thumb surgery Left     carpal - metacarpal joint replacement    TONSILLECTOMY      URETERAL STENT PLACEMENT Right 2023    Procedure: INSERTION, STENT, URETER;  Surgeon: Isrrael Rachel MD;  Location: [facility] OR;  Service: Urology;  Laterality: Right;        Social History     Tobacco Use    Smoking status: Former     Current packs/day: 0.00     Average packs/day: 0.8 packs/day for 15.0 years (11.3 ttl pk-yrs)     Types: Cigarettes     Start date:      Quit date:      Years since quittin.4    Smokeless tobacco: Never   Substance Use Topics    Alcohol use: Yes     Alcohol/week: 5.0 standard drinks of alcohol     Types: 5 Drinks containing 0.5 oz of alcohol per week     Comment: sociallly    Drug use: Never        Social History     Socioeconomic History    Marital status:      Spouse name: Hardik    Number of children: 3        Current Outpatient Medications   Medication Instructions    amLODIPine (NORVASC) 5 mg, Oral, Daily    atorvastatin (LIPITOR) 40 mg, Oral, Daily    blood sugar diagnostic (ONETOUCH VERIO TEST STRIPS) Strp 50 each, Misc.(Non-Drug; Combo Route), 2 times daily, Check glucose levels twice a day    blood sugar diagnostic Strp Use to check blood glucose levels 2 times daily    colchicine (COLCRYS) 0.6 mg, Oral, Daily    cyclobenzaprine (FLEXERIL) 5 mg, Oral, 2 times daily PRN    febuxostat (ULORIC) 40 mg Tab Take 1 tablet by mouth every day    febuxostat (ULORIC) 40 mg, Oral, Daily    gabapentin (NEURONTIN) 300 mg, Oral, 3 times daily    lancets (ONETOUCH DELICA PLUS LANCET) 33 gauge Misc Use to check blood glucose levels twice daily    magnesium oxide (MAG-OX) 400 mg (241.3 mg magnesium) tablet Take 1 tablet by mouth once daily    meloxicam (MOBIC) 15 mg, Oral, Daily    metFORMIN (GLUCOPHAGE) 500 mg, Oral, With breakfast    multivit with calcium,iron,min (WOMEN'S  "MULTIPLE VITAMINS ORAL) Oral    omeprazole (PRILOSEC) 20 mg, Oral, Daily    OZEMPIC 0.5 mg, Subcutaneous, Every 7 days    sodium bicarbonate 650 MG tablet 1 tablet, Oral, 3 times daily    triamcinolone (NASACORT) 55 mcg nasal inhaler 2 sprays, Nasal, Daily       Review of patient's allergies indicates:   Allergen Reactions    Allopurinol analogues Rash    Hydrochlorothiazide Palpitations    Percocet [oxycodone-acetaminophen] Itching        Patient Care Team:  Angela Baez MD as PCP - General (Family Medicine)  Sandy Gloria as   Isrrael Rachel MD as Consulting Physician (Urology)  [facility]-Referrals, [facility]  Tavo Casper MD as Consulting Physician (Rheumatology)  Angela Joyce MD (Dermatology)  Eusebio Mckeon MD as Consulting Physician (Gastroenterology)  Tucker Wayne LPN as Care Coordinator       Subjective:     Review of Systems    12 point review of systems conducted, negative except as stated in the history of present illness. See HPI for details.      Objective:     Visit Vitals  BP (!) 140/90 (BP Location: Left arm, Patient Position: Sitting)   Pulse 94   Resp 16   Ht 5' 3" (1.6 m)   Wt 79.1 kg (174 lb 6.4 oz)   SpO2 97%   BMI 30.89 kg/m²       Physical Exam    General: Alert and oriented, No acute distress.  Head: Normocephalic, Atraumatic.  Eye: Pupils are equal, round and reactive to light, Extraocular movements are intact, Sclera non-icteric.  Ears/Nose/Throat: Normal, Mucosa moist,Clear.  Neck/Thyroid: Supple, Non-tender  Respiratory: Clear to auscultation bilaterally  Cardiovascular: Regular rate and rhythm, S1/S2 normal  Gastrointestinal: Soft, Non-tender, Non-distended, Normal bowel sounds, No palpable organomegaly.  Musculoskeletal: Normal range of motion.  Integumentary: Warm, Dry  Extremities: No clubbing, cyanosis or edema  Neurologic: No focal deficits, Cranial Nerves II-XII are grossly intact  Psychiatric: Normal interaction, Coherent speech "       Assessment:       ICD-10-CM ICD-9-CM   1. Essential hypertension  I10 401.9   2. Mixed hyperlipidemia  E78.2 272.2   3. Pre-diabetes  R73.03 790.29   4. Gastroesophageal reflux disease without esophagitis  K21.9 530.81   5. Acute idiopathic gout of multiple sites  M10.09 274.01   6. Primary osteoarthritis of right knee  M17.11 715.16   7. Lumbar degenerative disc disease  M51.36 722.52   8. Seasonal allergies  J30.2 477.9   9. Elevated liver function tests  R79.89 790.6   10. Disturbed concentration  R41.840 799.51        Plan:      1. Essential hypertension  Patient has been taking medication. Blood pressure goal of less than 130/80-blood pressure is elevated recommendations for patient to monitor blood pressure at home bring readings to next visit-if blood pressure is consistently above 140/90 patient needs to make appointment to address elevated blood pressure sooner. Continue to encourage diet and activity modifications. Including stress management. Pathophysiology/treatment/dangers discussed.    - CBC Auto Differential; Future    2. Mixed hyperlipidemia  Lab Results   Component Value Date    CHOL 122 06/03/2024    LDL 52.00 06/03/2024    TRIG 162 (H) 06/03/2024    HDL 38 06/03/2024     Pathophysiology/treatment/dangers discussed. Patient to continue diet modification/medication.   Total cholesterol 122 ( Goal less than 200) HDL 38 ( Goal high as possible) LDL 52 ( Goal less than 100) Triglycerides 162 ( Goal less than 150)     3. Pre-diabetes  Hemoglobin A1c  .  Lab Results   Component Value Date    HGBA1C 6.5 06/03/2024     Normal less than 5.7 - Diagnosis of Type 2 Diabetes Mellitus at 6.5. Encourage diet and activity modification- medication options discussed- per patient's request Rx Ozempic sent to pharmacy-modifications to metformin discussed. Pathophysiology/treatment/dangers discussed.    - semaglutide (OZEMPIC) 0.25 mg or 0.5 mg (2 mg/3 mL) pen injector; Inject 0.5 mg into the skin every 7 days.   Dispense: 3 mL; Refill: 0  - Hemoglobin A1C; Future    4. Gastroesophageal reflux disease without esophagitis  Patient is taking medication for reflux-made aware of risk associated with medication.  For this patient benefits outweigh the risk.      5. Acute idiopathic gout of multiple sites  Pathophysiology/Treatment/ Dangers Discussed.  Patient to continue medication-Rx sent to pharmacy per patient's request.  - febuxostat (ULORIC) 40 mg Tab; Take 1 tablet (40 mg total) by mouth once daily.  Dispense: 90 tablet; Refill: 1  - Uric Acid; Future    6. Primary osteoarthritis of right knee  Patient is taking medication for osteoarthritis.    Pathophysiology/treatment/dangers discussed at length.    Patient feels that the benefits of the medication outweighed the risk.    No noticeable side effects of medicine at this time.    7. Lumbar degenerative disc disease  Patient is taking medication .    Pathophysiology/treatment/dangers discussed at length.    Patient feels that the benefits of the medication outweighed the risk.    No noticeable side effects of medicine at this time.    8. Seasonal allergies  Recommend use of nasal steroid/antihistamine/Neti Pot/Hydration-options discussed if inadequate response.  Diet and lifestyle modifications discussed.    - triamcinolone (NASACORT) 55 mcg nasal inhaler; 2 sprays by Nasal route once daily.  Dispense: 10.8 mL; Refill: 2    9. Elevated liver function tests  Pathophysiology/Treatment/ Dangers Discussed.  Check studies management based on finding.  - US Abdomen Limited; Future  - Comprehensive Metabolic Panel; Future    10. Disturbed concentration  Pathophysiology/Treatment/ Dangers Discussed.  Per patient's request referral to psychologist for ADD assessment.  - Ambulatory referral/consult to Psychology; Future      Follow up in about 3 months (around 9/4/2024). In addition to their scheduled follow up, the patient has also been instructed to follow up on as needed basis.      Future Appointments   Date Time Provider Department Center   6/13/2024  9:30 AM [address]   11/22/2024 10:00 AM Angela Baez MD [facility] [place] Indira Gautam, MD        Follow up in about 3 months (around 9/4/2024). In addition to their scheduled follow up, the patient has also been instructed to follow up on as needed basis.     Future Appointments   Date Time Provider Department Center   6/13/2024  9:30 AM [address]   11/22/2024 10:00 AM Angela Baez MD [facility] [place] Indira Gautam, MD

## 2024-06-03 ENCOUNTER — LAB VISIT (OUTPATIENT)
Dept: LAB | Facility: HOSPITAL | Age: 64
End: 2024-06-03
Attending: FAMILY MEDICINE
Payer: COMMERCIAL

## 2024-06-03 DIAGNOSIS — R73.03 PRE-DIABETES: Chronic | ICD-10-CM

## 2024-06-03 DIAGNOSIS — Z87.39 HISTORY OF GOUT: ICD-10-CM

## 2024-06-03 DIAGNOSIS — E78.2 MIXED HYPERLIPIDEMIA: ICD-10-CM

## 2024-06-03 LAB
ALBUMIN SERPL-MCNC: 4.3 G/DL (ref 3.4–4.8)
ALBUMIN/GLOB SERPL: 1.2 RATIO (ref 1.1–2)
ALP SERPL-CCNC: 86 UNIT/L (ref 40–150)
ALT SERPL-CCNC: 72 UNIT/L (ref 0–55)
ANION GAP SERPL CALC-SCNC: 10 MEQ/L
AST SERPL-CCNC: 57 UNIT/L (ref 5–34)
BASOPHILS # BLD AUTO: 0.06 X10(3)/MCL
BASOPHILS NFR BLD AUTO: 1.1 %
BILIRUB SERPL-MCNC: 0.6 MG/DL
BUN SERPL-MCNC: 10 MG/DL (ref 9.8–20.1)
CALCIUM SERPL-MCNC: 9.4 MG/DL (ref 8.4–10.2)
CHLORIDE SERPL-SCNC: 103 MMOL/L (ref 98–107)
CHOLEST SERPL-MCNC: 122 MG/DL
CHOLEST/HDLC SERPL: 3 {RATIO} (ref 0–5)
CO2 SERPL-SCNC: 25 MMOL/L (ref 23–31)
CREAT SERPL-MCNC: 0.77 MG/DL (ref 0.55–1.02)
CREAT/UREA NIT SERPL: 13
EOSINOPHIL # BLD AUTO: 0.1 X10(3)/MCL (ref 0–0.9)
EOSINOPHIL NFR BLD AUTO: 1.9 %
ERYTHROCYTE [DISTWIDTH] IN BLOOD BY AUTOMATED COUNT: 12 % (ref 11.5–17)
EST. AVERAGE GLUCOSE BLD GHB EST-MCNC: 139.9 MG/DL
GFR SERPLBLD CREATININE-BSD FMLA CKD-EPI: >60 ML/MIN/1.73/M2
GLOBULIN SER-MCNC: 3.7 GM/DL (ref 2.4–3.5)
GLUCOSE SERPL-MCNC: 143 MG/DL (ref 82–115)
HBA1C MFR BLD: 6.5 %
HCT VFR BLD AUTO: 37.6 % (ref 37–47)
HDLC SERPL-MCNC: 38 MG/DL (ref 35–60)
HGB BLD-MCNC: 13.5 G/DL (ref 12–16)
IMM GRANULOCYTES # BLD AUTO: 0.01 X10(3)/MCL (ref 0–0.04)
IMM GRANULOCYTES NFR BLD AUTO: 0.2 %
LDLC SERPL CALC-MCNC: 52 MG/DL (ref 50–140)
LYMPHOCYTES # BLD AUTO: 1.83 X10(3)/MCL (ref 0.6–4.6)
LYMPHOCYTES NFR BLD AUTO: 34.3 %
MCH RBC QN AUTO: 32.9 PG (ref 27–31)
MCHC RBC AUTO-ENTMCNC: 35.9 G/DL (ref 33–36)
MCV RBC AUTO: 91.7 FL (ref 80–94)
MONOCYTES # BLD AUTO: 0.51 X10(3)/MCL (ref 0.1–1.3)
MONOCYTES NFR BLD AUTO: 9.6 %
NEUTROPHILS # BLD AUTO: 2.83 X10(3)/MCL (ref 2.1–9.2)
NEUTROPHILS NFR BLD AUTO: 52.9 %
NRBC BLD AUTO-RTO: 0 %
PLATELET # BLD AUTO: 235 X10(3)/MCL (ref 130–400)
PMV BLD AUTO: 8.8 FL (ref 7.4–10.4)
POTASSIUM SERPL-SCNC: 3.3 MMOL/L (ref 3.5–5.1)
PROT SERPL-MCNC: 8 GM/DL (ref 5.8–7.6)
RBC # BLD AUTO: 4.1 X10(6)/MCL (ref 4.2–5.4)
SODIUM SERPL-SCNC: 138 MMOL/L (ref 136–145)
TRIGL SERPL-MCNC: 162 MG/DL (ref 37–140)
URATE SERPL-MCNC: 6.6 MG/DL (ref 2.6–6)
VLDLC SERPL CALC-MCNC: 32 MG/DL
WBC # SPEC AUTO: 5.34 X10(3)/MCL (ref 4.5–11.5)

## 2024-06-03 PROCEDURE — 36415 COLL VENOUS BLD VENIPUNCTURE: CPT

## 2024-06-03 PROCEDURE — 83036 HEMOGLOBIN GLYCOSYLATED A1C: CPT

## 2024-06-03 PROCEDURE — 80061 LIPID PANEL: CPT

## 2024-06-03 PROCEDURE — 80053 COMPREHEN METABOLIC PANEL: CPT

## 2024-06-03 PROCEDURE — 84550 ASSAY OF BLOOD/URIC ACID: CPT

## 2024-06-03 PROCEDURE — 85025 COMPLETE CBC W/AUTO DIFF WBC: CPT

## 2024-06-04 ENCOUNTER — OFFICE VISIT (OUTPATIENT)
Dept: FAMILY MEDICINE | Facility: CLINIC | Age: 64
End: 2024-06-04
Payer: COMMERCIAL

## 2024-06-04 VITALS
BODY MASS INDEX: 30.9 KG/M2 | DIASTOLIC BLOOD PRESSURE: 90 MMHG | WEIGHT: 174.38 LBS | HEART RATE: 94 BPM | HEIGHT: 63 IN | OXYGEN SATURATION: 97 % | SYSTOLIC BLOOD PRESSURE: 140 MMHG | RESPIRATION RATE: 16 BRPM

## 2024-06-04 DIAGNOSIS — E78.2 MIXED HYPERLIPIDEMIA: Chronic | ICD-10-CM

## 2024-06-04 DIAGNOSIS — J30.2 SEASONAL ALLERGIES: ICD-10-CM

## 2024-06-04 DIAGNOSIS — R41.840 DISTURBED CONCENTRATION: ICD-10-CM

## 2024-06-04 DIAGNOSIS — M10.09 ACUTE IDIOPATHIC GOUT OF MULTIPLE SITES: ICD-10-CM

## 2024-06-04 DIAGNOSIS — E78.2 MIXED HYPERLIPIDEMIA: ICD-10-CM

## 2024-06-04 DIAGNOSIS — M17.11 PRIMARY OSTEOARTHRITIS OF RIGHT KNEE: ICD-10-CM

## 2024-06-04 DIAGNOSIS — I10 ESSENTIAL HYPERTENSION: Primary | ICD-10-CM

## 2024-06-04 DIAGNOSIS — M51.36 LUMBAR DEGENERATIVE DISC DISEASE: Chronic | ICD-10-CM

## 2024-06-04 DIAGNOSIS — R79.89 ELEVATED LIVER FUNCTION TESTS: ICD-10-CM

## 2024-06-04 DIAGNOSIS — K21.9 GASTROESOPHAGEAL REFLUX DISEASE WITHOUT ESOPHAGITIS: ICD-10-CM

## 2024-06-04 DIAGNOSIS — R73.03 PRE-DIABETES: Chronic | ICD-10-CM

## 2024-06-04 PROCEDURE — 1159F MED LIST DOCD IN RCRD: CPT | Mod: CPTII,,, | Performed by: FAMILY MEDICINE

## 2024-06-04 PROCEDURE — 3077F SYST BP >= 140 MM HG: CPT | Mod: CPTII,,, | Performed by: FAMILY MEDICINE

## 2024-06-04 PROCEDURE — 99214 OFFICE O/P EST MOD 30 MIN: CPT | Mod: ,,, | Performed by: FAMILY MEDICINE

## 2024-06-04 PROCEDURE — 3080F DIAST BP >= 90 MM HG: CPT | Mod: CPTII,,, | Performed by: FAMILY MEDICINE

## 2024-06-04 PROCEDURE — 3044F HG A1C LEVEL LT 7.0%: CPT | Mod: CPTII,,, | Performed by: FAMILY MEDICINE

## 2024-06-04 PROCEDURE — 3008F BODY MASS INDEX DOCD: CPT | Mod: CPTII,,, | Performed by: FAMILY MEDICINE

## 2024-06-04 PROCEDURE — 1160F RVW MEDS BY RX/DR IN RCRD: CPT | Mod: CPTII,,, | Performed by: FAMILY MEDICINE

## 2024-06-04 RX ORDER — ATORVASTATIN CALCIUM 40 MG/1
40 TABLET, FILM COATED ORAL DAILY
Qty: 90 TABLET | Refills: 1 | Status: SHIPPED | OUTPATIENT
Start: 2024-06-04 | End: 2025-06-04

## 2024-06-04 RX ORDER — FEBUXOSTAT 40 MG/1
40 TABLET, FILM COATED ORAL DAILY
Qty: 90 TABLET | Refills: 1 | Status: SHIPPED | OUTPATIENT
Start: 2024-06-04

## 2024-06-04 RX ORDER — TRIAMCINOLONE ACETONIDE 55 UG/1
2 SPRAY, METERED NASAL DAILY
Qty: 10.8 ML | Refills: 2 | Status: SHIPPED | OUTPATIENT
Start: 2024-06-04

## 2024-06-04 RX ORDER — SEMAGLUTIDE 0.68 MG/ML
0.5 INJECTION, SOLUTION SUBCUTANEOUS
Qty: 3 ML | Refills: 0 | Status: SHIPPED | OUTPATIENT
Start: 2024-06-04 | End: 2024-07-04

## 2024-06-04 NOTE — LETTER
AUTHORIZATION FOR RELEASE OF   CONFIDENTIAL INFORMATION    Dear Dr. Goldberg,    We are seeing Nehal Araujo, date of birth 1960, in the clinic at [facility]. Angela Baez MD is the patient's PCP. Nehal Araujo has an outstanding lab/procedure at the time we reviewed her chart. In order to help keep her health information updated, she has authorized us to request the following medical record(s):        (  )  MAMMOGRAM                                      ( x )  COLONOSCOPY      (  )  PAP SMEAR                                          (  )  OUTSIDE LAB RESULTS     (  )  DEXA SCAN                                          (  )  EYE EXAM            (  )  FOOT EXAM                                          (  )  ENTIRE RECORD     (  )  OUTSIDE IMMUNIZATIONS                 (  )  _______________         Please fax records to Angela Baez MD, [phone]     If you have any questions, please contact Rosana at [phone].           Patient Name: Nehal Araujo  : 1960  Patient Phone #: [phone]

## 2024-06-18 ENCOUNTER — HOSPITAL ENCOUNTER (OUTPATIENT)
Dept: RADIOLOGY | Facility: HOSPITAL | Age: 64
Discharge: HOME | End: 2024-06-18
Attending: FAMILY MEDICINE
Payer: COMMERCIAL

## 2024-06-18 DIAGNOSIS — R79.89 ELEVATED LIVER FUNCTION TESTS: ICD-10-CM

## 2024-06-18 PROCEDURE — 76705 ECHO EXAM OF ABDOMEN: CPT | Mod: TC

## 2024-07-01 DIAGNOSIS — K21.9 GASTROESOPHAGEAL REFLUX DISEASE, UNSPECIFIED WHETHER ESOPHAGITIS PRESENT: ICD-10-CM

## 2024-07-01 RX ORDER — OMEPRAZOLE 20 MG/1
20 CAPSULE, DELAYED RELEASE ORAL DAILY
Qty: 30 CAPSULE | Refills: 3 | Status: SHIPPED | OUTPATIENT
Start: 2024-07-01

## 2024-07-16 ENCOUNTER — TELEPHONE (OUTPATIENT)
Dept: HEMATOLOGY/ONCOLOGY | Facility: CLINIC | Age: 64
End: 2024-07-16
Payer: COMMERCIAL

## 2024-07-16 ENCOUNTER — DOCUMENTATION ONLY (OUTPATIENT)
Dept: FAMILY MEDICINE | Facility: CLINIC | Age: 64
End: 2024-07-16
Payer: COMMERCIAL

## 2024-07-16 DIAGNOSIS — K21.9 GASTROESOPHAGEAL REFLUX DISEASE WITHOUT ESOPHAGITIS: Primary | ICD-10-CM

## 2024-07-16 LAB
LEFT EYE DM RETINOPATHY: NEGATIVE
LEFT EYE DM RETINOPATHY: NEGATIVE
RIGHT EYE DM RETINOPATHY: NEGATIVE
RIGHT EYE DM RETINOPATHY: NEGATIVE

## 2024-07-24 DIAGNOSIS — R73.03 PRE-DIABETES: ICD-10-CM

## 2024-07-24 RX ORDER — METFORMIN HYDROCHLORIDE 500 MG/1
500 TABLET ORAL
Qty: 90 TABLET | Refills: 3 | Status: SHIPPED | OUTPATIENT
Start: 2024-07-24 | End: 2025-07-24

## 2024-08-09 ENCOUNTER — DOCUMENTATION ONLY (OUTPATIENT)
Dept: HEMATOLOGY/ONCOLOGY | Facility: CLINIC | Age: 64
End: 2024-08-09
Payer: COMMERCIAL

## 2024-08-09 ENCOUNTER — PATIENT MESSAGE (OUTPATIENT)
Dept: HEMATOLOGY/ONCOLOGY | Facility: CLINIC | Age: 64
End: 2024-08-09
Payer: COMMERCIAL

## 2024-09-13 DIAGNOSIS — K21.9 GASTROESOPHAGEAL REFLUX DISEASE, UNSPECIFIED WHETHER ESOPHAGITIS PRESENT: ICD-10-CM

## 2024-09-13 RX ORDER — OMEPRAZOLE 20 MG/1
20 CAPSULE, DELAYED RELEASE ORAL DAILY
Qty: 30 CAPSULE | Refills: 3 | Status: SHIPPED | OUTPATIENT
Start: 2024-09-13

## 2024-09-15 DIAGNOSIS — M10.09 ACUTE IDIOPATHIC GOUT OF MULTIPLE SITES: ICD-10-CM

## 2024-09-16 RX ORDER — FEBUXOSTAT 40 MG/1
40 TABLET, FILM COATED ORAL DAILY
Qty: 90 TABLET | Refills: 1 | Status: SHIPPED | OUTPATIENT
Start: 2024-09-16

## 2024-09-18 DIAGNOSIS — R73.03 PRE-DIABETES: Primary | Chronic | ICD-10-CM

## 2024-09-18 DIAGNOSIS — Z87.442 HISTORY OF KIDNEY STONES: ICD-10-CM

## 2024-09-21 ENCOUNTER — OFFICE VISIT (OUTPATIENT)
Dept: URGENT CARE | Facility: CLINIC | Age: 64
End: 2024-09-21
Payer: COMMERCIAL

## 2024-09-21 VITALS
TEMPERATURE: 98 F | HEIGHT: 63 IN | WEIGHT: 172 LBS | BODY MASS INDEX: 30.48 KG/M2 | RESPIRATION RATE: 20 BRPM | DIASTOLIC BLOOD PRESSURE: 86 MMHG | HEART RATE: 85 BPM | SYSTOLIC BLOOD PRESSURE: 138 MMHG | OXYGEN SATURATION: 96 %

## 2024-09-21 DIAGNOSIS — M79.672 LEFT FOOT PAIN: Primary | ICD-10-CM

## 2024-09-21 PROCEDURE — 99214 OFFICE O/P EST MOD 30 MIN: CPT | Mod: ,,, | Performed by: FAMILY MEDICINE

## 2024-09-21 RX ORDER — DICLOFENAC SODIUM 50 MG/1
50 TABLET, DELAYED RELEASE ORAL 2 TIMES DAILY
Qty: 28 TABLET | Refills: 0 | Status: SHIPPED | OUTPATIENT
Start: 2024-09-21 | End: 2024-10-05

## 2024-09-21 NOTE — PROGRESS NOTES
"Subjective:      Patient ID: Nehal Araujo is a 64 y.o. female.    Vitals:  height is 5' 3" (1.6 m) and weight is 78 kg (172 lb). Her oral temperature is 98.2 °F (36.8 °C). Her blood pressure is 138/86 and her pulse is 85. Her respiration is 20 and oxygen saturation is 96%.     Chief Complaint: Fall    Patient is a 64 y.o. female who presents to urgent care with complaints of left foot pain after fall last night Alleviating factors include tylenol with mild amount of relief. Patient denies numbness or tingling in foot.     Fall  Pertinent negatives include no fever.     Constitution: Negative for chills, sweating, fatigue and fever.   HENT: Negative.     Neck: neck negative.   Cardiovascular: Negative.    Eyes: Negative.    Respiratory: Negative.     Gastrointestinal: Negative.    Endocrine: negative.   Genitourinary: Negative.    Musculoskeletal:  Positive for pain, trauma and joint swelling.   Skin: Negative.    Allergic/Immunologic: Negative.    Neurological:  Negative for disorientation and altered mental status.   Hematologic/Lymphatic: Negative.    Psychiatric/Behavioral:  Negative for altered mental status, disorientation and confusion.       Objective:     Physical Exam   Constitutional: She is oriented to person, place, and time. She appears well-developed. She is cooperative.   HENT:   Head: Normocephalic and atraumatic.   Ears:   Right Ear: Hearing, tympanic membrane, external ear and ear canal normal.   Left Ear: Hearing, tympanic membrane, external ear and ear canal normal.   Nose: Nose normal. No mucosal edema or nasal deformity. No epistaxis. Right sinus exhibits no maxillary sinus tenderness and no frontal sinus tenderness. Left sinus exhibits no maxillary sinus tenderness and no frontal sinus tenderness.   Mouth/Throat: Uvula is midline, oropharynx is clear and moist and mucous membranes are normal. No trismus in the jaw. Normal dentition. No uvula swelling.   Eyes: Conjunctivae and lids are " normal.   Neck: Trachea normal and phonation normal. Neck supple.   Cardiovascular: Normal rate, regular rhythm, normal heart sounds and normal pulses.   Pulmonary/Chest: Effort normal and breath sounds normal.   Abdominal: Normal appearance and bowel sounds are normal. Soft.   Musculoskeletal: Normal range of motion.         General: Swelling and tenderness present. Normal range of motion.      Comments: Tenderness over palpation to the 4th and 5th metatarsal area   Neurological: She is alert and oriented to person, place, and time. She exhibits normal muscle tone.   Skin: Skin is warm, dry and intact.   Psychiatric: Her speech is normal and behavior is normal. Judgment and thought content normal.   Nursing note and vitals reviewed.         Previous History      Review of patient's allergies indicates:   Allergen Reactions    Allopurinol analogues Rash    Hydrochlorothiazide Palpitations    Percocet [oxycodone-acetaminophen] Itching       Past Medical History:   Diagnosis Date    Acute idiopathic gout of multiple sites     GERD (gastroesophageal reflux disease)     History of kidney stones 11/14/2023    Lumbar degenerative disc disease 02/07/2023    Pre-diabetes 12/08/2022    Primary osteoarthritis of right knee 02/07/2023     Current Outpatient Medications   Medication Instructions    amLODIPine (NORVASC) 5 mg, Oral, Daily    atorvastatin (LIPITOR) 40 mg, Oral, Daily    blood sugar diagnostic (ONETOUCH VERIO TEST STRIPS) Strp 50 each, Misc.(Non-Drug; Combo Route), 2 times daily, Check glucose levels twice a day    blood sugar diagnostic Strp Use to check blood glucose levels 2 times daily    cyclobenzaprine (FLEXERIL) 5 mg, Oral, 2 times daily PRN    diclofenac (VOLTAREN) 50 mg, Oral, 2 times daily    febuxostat (ULORIC) 40 mg Tab Take 1 tablet by mouth every day    febuxostat (ULORIC) 40 mg, Oral, Daily    lancets (ONETOUCH DELICA PLUS LANCET) 33 gauge Misc Use to check blood glucose levels twice daily     magnesium oxide (MAG-OX) 400 mg (241.3 mg magnesium) tablet Take 1 tablet by mouth once daily    meloxicam (MOBIC) 15 mg, Oral, Daily    metFORMIN (GLUCOPHAGE) 500 mg, Oral, With breakfast    multivit with calcium,iron,min (WOMEN'S MULTIPLE VITAMINS ORAL) Oral    omeprazole (PRILOSEC) 20 mg, Oral, Daily    triamcinolone (NASACORT) 55 mcg nasal inhaler 2 sprays, Nasal, Daily     Past Surgical History:   Procedure Laterality Date    ARTHROSCOPY,KNEE,WITH MENISCUS REPAIR Right 2023    Procedure: ARTHROSCOPY,KNEE,WITH MENISCUS REPAIR;  Surgeon: Shay Daniels Jr., MD;  Location: [facility];  Service: Orthopedics;  Laterality: Right;    BUNIONECTOMY Bilateral      SECTION      x 3    CHOLECYSTECTOMY      CYSTOURETEROSCOPY,WITH HOLMIUM LASER LITHOTRIPSY OF URETERAL CALCULUS N/A 2023    Procedure: CYSTOURETEROSCOPY,WITH HOLMIUM LASER LITHOTRIPSY OF URETERAL CALCULUS;  Surgeon: Isrrael Rachel MD;  Location: [facility];  Service: Urology;  Laterality: N/A;    ENDOMETRIAL ABLATION      LAPAROSCOPIC PARTIAL COLECTOMY      thumb surgery Left     carpal - metacarpal joint replacement    TONSILLECTOMY      URETERAL STENT PLACEMENT Right 2023    Procedure: INSERTION, STENT, URETER;  Surgeon: Isrrael Rachel MD;  Location: [facility];  Service: Urology;  Laterality: Right;     Family History   Problem Relation Name Age of Onset    Diabetes Mother      Breast cancer Mother      Heart disease Mother      Stroke Mother      No Known Problems Father         Social History     Tobacco Use    Smoking status: Former     Current packs/day: 0.00     Average packs/day: 0.8 packs/day for 15.0 years (11.3 ttl pk-yrs)     Types: Cigarettes     Start date:      Quit date:      Years since quittin.7    Smokeless tobacco: Never   Substance Use Topics    Alcohol use: Yes     Alcohol/week: 5.0 standard drinks of alcohol     Types: 5 Drinks containing 0.5 oz of alcohol per week     Comment: sociallly    Drug use:  "Never        Physical Exam      Vital Signs Reviewed   /86   Pulse 85   Temp 98.2 °F (36.8 °C) (Oral)   Resp 20   Ht 5' 3" (1.6 m)   Wt 78 kg (172 lb)   SpO2 96%   BMI 30.47 kg/m²        Procedures    Procedures     Labs     Results for orders placed or performed in visit on 07/16/24   PHARMACOGENOMICS PANEL    Specimen: Saliva   Result Value Ref Range    Lab Method See Comment     Lab Comment See Comment       Assessment:     1. Left foot pain        Plan:   Preliminary read of x-ray shows possible fracture to the foot, 5th metatarsal.  We will wait for radiology official report.  If fracture is identified by the radiologist we will send an orthopedic referral.   Recommend to wear boot and crutches  Rest until you feel better  Apply ice for 10-15 minutes a few times a day  Use Ace wrap for compression  Elevate the extremity as often as she can  Call or seek immediate medical attention if you develop worsening pain, any new or worrisome symptoms that arise at home, or if you do not get better as expected    Left foot pain  -     X-Ray Ankle Complete Left; Future; Expected date: 09/21/2024  -     X-Ray Foot Complete Left; Future; Expected date: 09/21/2024  -     Walking Boot For Home Use  -     BANDAGE ELASTIC 3IN ACE  -     Ambulatory referral/consult to Orthopedics    Other orders  -     diclofenac (VOLTAREN) 50 MG EC tablet; Take 1 tablet (50 mg total) by mouth 2 (two) times daily. for 14 days  Dispense: 28 tablet; Refill: 0                    "

## 2024-09-26 ENCOUNTER — OFFICE VISIT (OUTPATIENT)
Dept: ORTHOPEDICS | Facility: CLINIC | Age: 64
End: 2024-09-26
Payer: COMMERCIAL

## 2024-09-26 VITALS
SYSTOLIC BLOOD PRESSURE: 157 MMHG | WEIGHT: 171.94 LBS | BODY MASS INDEX: 30.46 KG/M2 | DIASTOLIC BLOOD PRESSURE: 93 MMHG | HEIGHT: 63 IN | HEART RATE: 93 BPM

## 2024-09-26 DIAGNOSIS — S92.352D CLOSED FRACTURE OF BASE OF FIFTH METATARSAL BONE OF LEFT FOOT WITH ROUTINE HEALING: Primary | ICD-10-CM

## 2024-09-26 PROCEDURE — 1159F MED LIST DOCD IN RCRD: CPT | Mod: CPTII,,, | Performed by: PHYSICIAN ASSISTANT

## 2024-09-26 PROCEDURE — 3008F BODY MASS INDEX DOCD: CPT | Mod: CPTII,,, | Performed by: PHYSICIAN ASSISTANT

## 2024-09-26 PROCEDURE — 3080F DIAST BP >= 90 MM HG: CPT | Mod: CPTII,,, | Performed by: PHYSICIAN ASSISTANT

## 2024-09-26 PROCEDURE — 3077F SYST BP >= 140 MM HG: CPT | Mod: CPTII,,, | Performed by: PHYSICIAN ASSISTANT

## 2024-09-26 PROCEDURE — 1160F RVW MEDS BY RX/DR IN RCRD: CPT | Mod: CPTII,,, | Performed by: PHYSICIAN ASSISTANT

## 2024-09-26 PROCEDURE — 99213 OFFICE O/P EST LOW 20 MIN: CPT | Mod: ,,, | Performed by: PHYSICIAN ASSISTANT

## 2024-09-26 PROCEDURE — 3044F HG A1C LEVEL LT 7.0%: CPT | Mod: CPTII,,, | Performed by: PHYSICIAN ASSISTANT

## 2024-09-27 NOTE — PROGRESS NOTES
Chief Complaint:   Chief Complaint   Patient presents with    Injury     DOI - 9/20/24  L foot reports her foot was asleep and when she got back up her ankle rolled. Went to  rx a boot that has been helped along with the diclofenac        History of present illness:    This is a 64 y.o. year old female who complains of left foot injury.    Patient was states she got up from a chair after sitting on her foot and her foot was asleep and when she took a step for foot and ankle rolled   She went to an urgent care he was diagnosed with a foot fracture and put in a boot and here today for orthopedic evaluation      Current Outpatient Medications   Medication Sig    amLODIPine (NORVASC) 5 MG tablet Take 1 tablet (5 mg total) by mouth once daily.    atorvastatin (LIPITOR) 40 MG tablet Take 1 tablet (40 mg total) by mouth once daily.    blood sugar diagnostic (ONETOUCH VERIO TEST STRIPS) Strp 50 each by Misc.(Non-Drug; Combo Route) route 2 (two) times a day. Check glucose levels twice a day    blood sugar diagnostic Strp Use to check blood glucose levels 2 times daily    diclofenac (VOLTAREN) 50 MG EC tablet Take 1 tablet (50 mg total) by mouth 2 (two) times daily. for 14 days    febuxostat (ULORIC) 40 mg Tab Take 1 tablet by mouth every day    febuxostat (ULORIC) 40 mg Tab Take 1 tablet (40 mg total) by mouth once daily.    lancets (ONETOUCH DELICA PLUS LANCET) 33 gauge Misc Use to check blood glucose levels twice daily    magnesium oxide (MAG-OX) 400 mg (241.3 mg magnesium) tablet Take 1 tablet by mouth once daily    metFORMIN (GLUCOPHAGE) 500 MG tablet Take 1 tablet (500 mg total) by mouth daily with breakfast.    multivit with calcium,iron,min (WOMEN'S MULTIPLE VITAMINS ORAL) Take by mouth.    omeprazole (PRILOSEC) 20 MG capsule Take 1 capsule (20 mg total) by mouth once daily.    triamcinolone (NASACORT) 55 mcg nasal inhaler 2 sprays by Nasal route once daily.    cyclobenzaprine (FLEXERIL) 5 MG tablet Take 1 tablet (5  "mg total) by mouth 2 (two) times daily as needed for Muscle spasms.    meloxicam (MOBIC) 15 MG tablet Take 1 tablet (15 mg total) by mouth once daily. (Patient not taking: Reported on 9/26/2024)     No current facility-administered medications for this visit.       Review of Systems:    Constitution:   Denies chills, fever, and sweats.  HENT:   Denies headaches or blurry vision.  Cardiovascular:  Denies chest pain or irregular heart beat.  Respiratory:   Denies cough or shortness of breath.  Gastrointestinal:  Denies abdominal pain, nausea, or vomiting.  Musculoskeletal:   Denies muscle cramps.  Neurological:   Denies dizziness or focal weakness.  Psychiatric/Behavior: Normal mental status.  Hematology/Lymph:  Denies bleeding problem or easy bruising/bleeding.  Skin:    Denies rash or suspicious lesions.    Examination:    Vital Signs:    Vitals:    09/26/24 1305   BP: (!) 157/93   Pulse: 93   Weight: 78 kg (171 lb 15.3 oz)   Height: 5' 3" (1.6 m)       Body mass index is 30.46 kg/m².    Constitution:   Well-developed, well nourished patient in no acute distress.  Neurological:   Alert and oriented x 3 and cooperative to examination.     Psychiatric/Behavior: Normal mental status.  Respiratory:   No shortness of breath.  Eyes:    Extraoccular muscles intact  Skin:    No scars, rash or suspicious lesions.    Physical Exam:   Left foot and ankle  No obvious deformity. Plantar flexion and dorsiflexion is 60 degrees and 30 degrees, respectively.  Negative squeeze test.  Negative lateral malleolus tenderness.  Negative medial malleolus tenderness.  Negative talofibular ligament tenderness.  Negative anterior draw and lateral tilt.  5/5 strength, normal skin appearance and palpable pulses distally.  Sensibility normal.  Tenderness to palpation over the base of the 5th metatarsal pain   She has some mild edema and ecchymosis about the lateral aspect of the foot as well   Intact motor and sensation to the foot and toes " ankles   Brisk capillary refill to all the digits.          Imaging: X-rays reviewed from the urgent care  Nondisplaced transverse fracture present through the base of the 5th metatarsal.  No other convincing acute osseous disruption.  Joints are congruent.  There is no destructive skeletal lesion.  Incidentally noted regional degenerative changes and screw fixation of the 1st metatarsal.     There is widespread soft tissue swelling.  No tracking subcutaneous gas or radiopaque foreign body.     IMPRESSION  1. Acute nondisplaced 5th metatarsal base fracture with regional soft tissue swelling.  2. Chronic secondary findings discussed above.           Assessment: There are no diagnoses linked to this encounter.     Plan:  At this point the patient was placed in a postop shoe for her comfort more so than in the boot   I recommend that she uses whichever 1 is more comfortable for her and she can weightbear as tolerated with the appropriate orthotic device on.    The patient is told that there is a small chance of a nonunion with a type of fracture but more often than not these fractures heal up without any surgical intervention.    We will take a repeat x-ray in 2 weeks to make she was has been no displacement             DISCLAIMER: This note may have been dictated using voice recognition software and may contain grammatical errors.     NOTE: Consult report sent to referring provider via [employer].

## 2024-10-10 ENCOUNTER — HOSPITAL ENCOUNTER (OUTPATIENT)
Dept: RADIOLOGY | Facility: CLINIC | Age: 64
Discharge: HOME | End: 2024-10-10
Attending: PHYSICIAN ASSISTANT
Payer: COMMERCIAL

## 2024-10-10 ENCOUNTER — OFFICE VISIT (OUTPATIENT)
Dept: ORTHOPEDICS | Facility: CLINIC | Age: 64
End: 2024-10-10
Payer: COMMERCIAL

## 2024-10-10 VITALS
DIASTOLIC BLOOD PRESSURE: 89 MMHG | HEART RATE: 99 BPM | HEIGHT: 63 IN | SYSTOLIC BLOOD PRESSURE: 162 MMHG | BODY MASS INDEX: 30.46 KG/M2 | WEIGHT: 171.94 LBS

## 2024-10-10 DIAGNOSIS — S92.352D CLOSED FRACTURE OF BASE OF FIFTH METATARSAL BONE OF LEFT FOOT WITH ROUTINE HEALING: Primary | ICD-10-CM

## 2024-10-10 DIAGNOSIS — S92.352D CLOSED FRACTURE OF BASE OF FIFTH METATARSAL BONE OF LEFT FOOT WITH ROUTINE HEALING: ICD-10-CM

## 2024-10-10 PROCEDURE — 3008F BODY MASS INDEX DOCD: CPT | Mod: CPTII,,, | Performed by: PHYSICIAN ASSISTANT

## 2024-10-10 PROCEDURE — 1160F RVW MEDS BY RX/DR IN RCRD: CPT | Mod: CPTII,,, | Performed by: PHYSICIAN ASSISTANT

## 2024-10-10 PROCEDURE — 3079F DIAST BP 80-89 MM HG: CPT | Mod: CPTII,,, | Performed by: PHYSICIAN ASSISTANT

## 2024-10-10 PROCEDURE — 3077F SYST BP >= 140 MM HG: CPT | Mod: CPTII,,, | Performed by: PHYSICIAN ASSISTANT

## 2024-10-10 PROCEDURE — 99213 OFFICE O/P EST LOW 20 MIN: CPT | Mod: ,,, | Performed by: PHYSICIAN ASSISTANT

## 2024-10-10 PROCEDURE — 73630 X-RAY EXAM OF FOOT: CPT | Mod: LT,,, | Performed by: PHYSICIAN ASSISTANT

## 2024-10-10 PROCEDURE — 1159F MED LIST DOCD IN RCRD: CPT | Mod: CPTII,,, | Performed by: PHYSICIAN ASSISTANT

## 2024-10-10 PROCEDURE — 3044F HG A1C LEVEL LT 7.0%: CPT | Mod: CPTII,,, | Performed by: PHYSICIAN ASSISTANT

## 2024-10-16 ENCOUNTER — PATIENT MESSAGE (OUTPATIENT)
Dept: OTHER | Facility: OTHER | Age: 64
End: 2024-10-16
Payer: COMMERCIAL

## 2024-10-21 NOTE — PROGRESS NOTES
Chief Complaint:   Chief Complaint   Patient presents with    Follow-up     L foot f/u - DOI 09/20/24 - states she has noticed improvement. Presents with hard sole shoe.        History of present illness:    This is a 64 y.o. year old female who complains of follow up for her left foot 5th metatarsal fracture pain   Patient reports the pain is improving in his he has been walking with a hard-soled shoe which he states it was comfortable at this time getting around.          Current Outpatient Medications   Medication Sig    amLODIPine (NORVASC) 5 MG tablet Take 1 tablet (5 mg total) by mouth once daily.    atorvastatin (LIPITOR) 40 MG tablet Take 1 tablet (40 mg total) by mouth once daily.    blood sugar diagnostic (ONETOUCH VERIO TEST STRIPS) Strp 50 each by Misc.(Non-Drug; Combo Route) route 2 (two) times a day. Check glucose levels twice a day    blood sugar diagnostic Strp Use to check blood glucose levels 2 times daily    febuxostat (ULORIC) 40 mg Tab Take 1 tablet by mouth every day    febuxostat (ULORIC) 40 mg Tab Take 1 tablet (40 mg total) by mouth once daily.    lancets (ONETOUCH DELICA PLUS LANCET) 33 gauge Misc Use to check blood glucose levels twice daily    magnesium oxide (MAG-OX) 400 mg (241.3 mg magnesium) tablet Take 1 tablet by mouth once daily    meloxicam (MOBIC) 15 MG tablet Take 1 tablet (15 mg total) by mouth once daily.    metFORMIN (GLUCOPHAGE) 500 MG tablet Take 1 tablet (500 mg total) by mouth daily with breakfast.    multivit with calcium,iron,min (WOMEN'S MULTIPLE VITAMINS ORAL) Take by mouth.    omeprazole (PRILOSEC) 20 MG capsule Take 1 capsule (20 mg total) by mouth once daily.    triamcinolone (NASACORT) 55 mcg nasal inhaler 2 sprays by Nasal route once daily.    cyclobenzaprine (FLEXERIL) 5 MG tablet Take 1 tablet (5 mg total) by mouth 2 (two) times daily as needed for Muscle spasms.     No current facility-administered medications for this visit.       Review of  "Systems:    Constitution:   Denies chills, fever, and sweats.  HENT:   Denies headaches or blurry vision.  Cardiovascular:  Denies chest pain or irregular heart beat.  Respiratory:   Denies cough or shortness of breath.  Gastrointestinal:  Denies abdominal pain, nausea, or vomiting.  Musculoskeletal:   Denies muscle cramps.  Neurological:   Denies dizziness or focal weakness.  Psychiatric/Behavior: Normal mental status.  Hematology/Lymph:  Denies bleeding problem or easy bruising/bleeding.  Skin:    Denies rash or suspicious lesions.    Examination:    Vital Signs:    Vitals:    10/10/24 1320   BP: (!) 162/89   Pulse: 99   Weight: 78 kg (171 lb 15.3 oz)   Height: 5' 3" (1.6 m)       Body mass index is 30.46 kg/m².    Constitution:   Well-developed, well nourished patient in no acute distress.  Neurological:   Alert and oriented x 3 and cooperative to examination.     Psychiatric/Behavior: Normal mental status.  Respiratory:   No shortness of breath.  Eyes:    Extraoccular muscles intact  Skin:    No scars, rash or suspicious lesions.    Physical Exam:   Left foot and ankle  No obvious deformity. Plantar flexion and dorsiflexion is 60 degrees and 30 degrees, respectively.  Negative squeeze test.  Negative lateral malleolus tenderness.  Negative medial malleolus tenderness.  Negative talofibular ligament tenderness.  Negative anterior draw and lateral tilt.  5/5 strength, normal skin appearance and palpable pulses distally.  Sensibility normal.  Mild tenderness to palpation over the base of the 5th metatarsal   Decreased and resolving edema and ecchymosis about the lateral aspect of the foot as well   Intact motor and sensation to the foot and toes ankles   Brisk capillary refill to all the digits.      Imaging: X-rays ordered and images interpreted today personally by me of left foot three views   Patient was maintained position of the basal 5th metatarsal fracture in good position with no signs of displacement      "   Assessment: Closed fracture of base of fifth metatarsal bone of left foot with routine healing  -     X-Ray Foot Complete Left; Future; Expected date: 10/10/2024         Plan:  At this point the patient will continue with the hard sole cast shoe at her comfort and convenience.    Whenever her pain permits she was started wearing a regular shoe with a it was going to be based solely upon pain.    We will follow up with the patient 3 weeks or so to take a repeat x-ray and hopefully at that point she can wean out the postop shoe completely          DISCLAIMER: This note may have been dictated using voice recognition software and may contain grammatical errors.     NOTE: Consult report sent to referring provider via [employer].

## 2024-10-23 ENCOUNTER — PATIENT OUTREACH (OUTPATIENT)
Facility: CLINIC | Age: 64
End: 2024-10-23
Payer: COMMERCIAL

## 2024-10-23 NOTE — PROGRESS NOTES
Health Maintenance Topic(s) Outreach Outcomes & Actions Taken:    Eye Exam - Outreach Outcomes & Actions Taken  : I made an attempt to contact patient. No answer. Left message       Additional Notes:

## 2024-10-28 DIAGNOSIS — E78.2 MIXED HYPERLIPIDEMIA: Chronic | ICD-10-CM

## 2024-10-28 RX ORDER — ATORVASTATIN CALCIUM 40 MG/1
40 TABLET, FILM COATED ORAL DAILY
Qty: 90 TABLET | Refills: 1 | Status: SHIPPED | OUTPATIENT
Start: 2024-10-28 | End: 2025-10-28

## 2024-10-31 ENCOUNTER — OFFICE VISIT (OUTPATIENT)
Dept: ORTHOPEDICS | Facility: CLINIC | Age: 64
End: 2024-10-31
Payer: COMMERCIAL

## 2024-10-31 ENCOUNTER — HOSPITAL ENCOUNTER (OUTPATIENT)
Dept: RADIOLOGY | Facility: CLINIC | Age: 64
Discharge: HOME | End: 2024-10-31
Attending: PHYSICIAN ASSISTANT
Payer: COMMERCIAL

## 2024-10-31 VITALS
HEIGHT: 63 IN | BODY MASS INDEX: 30.48 KG/M2 | DIASTOLIC BLOOD PRESSURE: 90 MMHG | SYSTOLIC BLOOD PRESSURE: 140 MMHG | WEIGHT: 172 LBS | HEART RATE: 88 BPM

## 2024-10-31 DIAGNOSIS — S92.352D CLOSED FRACTURE OF BASE OF FIFTH METATARSAL BONE OF LEFT FOOT WITH ROUTINE HEALING: ICD-10-CM

## 2024-10-31 DIAGNOSIS — S92.352D CLOSED FRACTURE OF BASE OF FIFTH METATARSAL BONE OF LEFT FOOT WITH ROUTINE HEALING: Primary | ICD-10-CM

## 2024-10-31 PROCEDURE — 3080F DIAST BP >= 90 MM HG: CPT | Mod: CPTII,,, | Performed by: PHYSICIAN ASSISTANT

## 2024-10-31 PROCEDURE — 73630 X-RAY EXAM OF FOOT: CPT | Mod: LT,,, | Performed by: PHYSICIAN ASSISTANT

## 2024-10-31 PROCEDURE — 1160F RVW MEDS BY RX/DR IN RCRD: CPT | Mod: CPTII,,, | Performed by: PHYSICIAN ASSISTANT

## 2024-10-31 PROCEDURE — 1159F MED LIST DOCD IN RCRD: CPT | Mod: CPTII,,, | Performed by: PHYSICIAN ASSISTANT

## 2024-10-31 PROCEDURE — 3044F HG A1C LEVEL LT 7.0%: CPT | Mod: CPTII,,, | Performed by: PHYSICIAN ASSISTANT

## 2024-10-31 PROCEDURE — 3077F SYST BP >= 140 MM HG: CPT | Mod: CPTII,,, | Performed by: PHYSICIAN ASSISTANT

## 2024-10-31 PROCEDURE — 3008F BODY MASS INDEX DOCD: CPT | Mod: CPTII,,, | Performed by: PHYSICIAN ASSISTANT

## 2024-10-31 PROCEDURE — 99213 OFFICE O/P EST LOW 20 MIN: CPT | Mod: ,,, | Performed by: PHYSICIAN ASSISTANT

## 2024-11-04 NOTE — PROGRESS NOTES
Chief Complaint:   Chief Complaint   Patient presents with    Left Foot - Follow-up     Pt states she is doing well in regards the Lt foot. Denies pain. No complaints.        History of present illness:    This is a 64 y.o. year old female who complains of follow up for her left foot 5th metatarsal fracture the patient has stopped wearing the postop shoe as it causes him more discomfort in his regular shoe at this point.    Reports her pain has substantially improved    Current Outpatient Medications   Medication Sig    amLODIPine (NORVASC) 5 MG tablet Take 1 tablet (5 mg total) by mouth once daily.    atorvastatin (LIPITOR) 40 MG tablet Take 1 tablet (40 mg total) by mouth once daily.    blood sugar diagnostic (ONETOUCH VERIO TEST STRIPS) Strp 50 each by Misc.(Non-Drug; Combo Route) route 2 (two) times a day. Check glucose levels twice a day    blood sugar diagnostic Strp Use to check blood glucose levels 2 times daily    febuxostat (ULORIC) 40 mg Tab Take 1 tablet by mouth every day    febuxostat (ULORIC) 40 mg Tab Take 1 tablet (40 mg total) by mouth once daily.    lancets (ONETOUCH DELICA PLUS LANCET) 33 gauge Misc Use to check blood glucose levels twice daily    magnesium oxide (MAG-OX) 400 mg (241.3 mg magnesium) tablet Take 1 tablet by mouth once daily    meloxicam (MOBIC) 15 MG tablet Take 1 tablet (15 mg total) by mouth once daily.    metFORMIN (GLUCOPHAGE) 500 MG tablet Take 1 tablet (500 mg total) by mouth daily with breakfast.    multivit with calcium,iron,min (WOMEN'S MULTIPLE VITAMINS ORAL) Take by mouth.    omeprazole (PRILOSEC) 20 MG capsule Take 1 capsule (20 mg total) by mouth once daily.    triamcinolone (NASACORT) 55 mcg nasal inhaler 2 sprays by Nasal route once daily.    cyclobenzaprine (FLEXERIL) 5 MG tablet Take 1 tablet (5 mg total) by mouth 2 (two) times daily as needed for Muscle spasms.     No current facility-administered medications for this visit.       Review of  "Systems:    Constitution:   Denies chills, fever, and sweats.  HENT:   Denies headaches or blurry vision.  Cardiovascular:  Denies chest pain or irregular heart beat.  Respiratory:   Denies cough or shortness of breath.  Gastrointestinal:  Denies abdominal pain, nausea, or vomiting.  Musculoskeletal:   Denies muscle cramps.  Neurological:   Denies dizziness or focal weakness.  Psychiatric/Behavior: Normal mental status.  Hematology/Lymph:  Denies bleeding problem or easy bruising/bleeding.  Skin:    Denies rash or suspicious lesions.    Examination:    Vital Signs:    Vitals:    10/31/24 1350   BP: (!) 140/90   Pulse: 88   Weight: 78 kg (172 lb)   Height: 5' 3" (1.6 m)       Body mass index is 30.47 kg/m².    Constitution:   Well-developed, well nourished patient in no acute distress.  Neurological:   Alert and oriented x 3 and cooperative to examination.     Psychiatric/Behavior: Normal mental status.  Respiratory:   No shortness of breath.  Eyes:    Extraoccular muscles intact  Skin:    No scars, rash or suspicious lesions.    Physical Exam:   Left foot and ankle  No obvious deformity. Plantar flexion and dorsiflexion is 60 degrees and 30 degrees, respectively.  Negative squeeze test.  Negative lateral malleolus tenderness.  Negative medial malleolus tenderness.  Negative talofibular ligament tenderness.  Negative anterior draw and lateral tilt.  5/5 strength, normal skin appearance and palpable pulses distally.  Sensibility normal.  Very minimal tenderness to palpation over the base of the 5th metatarsal   Resolved edema and ecchymosis about the lateral aspect of the foot as well   Intact motor and sensation to the foot and toes ankles   Brisk capillary refill to all the digits.      Imaging: X-rays ordered and images interpreted today personally by me of left foot three views   Patient was maintained position of the basal 5th metatarsal fracture in good position with no signs of displacement and evidence callus " formation       Assessment: Closed fracture of base of fifth metatarsal bone of left foot with routine healing  -     X-Ray Foot Complete Left; Future; Expected date: 10/31/2024         Plan:  The patient will resume regular shoe wear at her convenience.    She was start slowly increasing her activities as tolerated.    The patient will follow up on a as needed basis unless she was having increasing problems with pain or pain that persists in the area of the 5th metatarsal fracture that she has but no need for further x-rays at this moment  DISCLAIMER: This note may have been dictated using voice recognition software and may contain grammatical errors.     NOTE: Consult report sent to referring provider via [employer].

## 2024-11-09 ENCOUNTER — PATIENT MESSAGE (OUTPATIENT)
Dept: FAMILY MEDICINE | Facility: CLINIC | Age: 64
End: 2024-11-09
Payer: COMMERCIAL

## 2024-11-11 NOTE — TELEPHONE ENCOUNTER
Please notify patient that we can discuss diagnosis and treatment options for type 2 diabetes at her next visit.

## 2024-11-12 ENCOUNTER — PATIENT OUTREACH (OUTPATIENT)
Facility: CLINIC | Age: 64
End: 2024-11-12
Payer: COMMERCIAL

## 2024-11-12 NOTE — PROGRESS NOTES
Value base Outreach  No call needed, pt. will see PCP 11/2/24, actively enrolled in Digital [facility].

## 2024-12-09 DIAGNOSIS — I10 ESSENTIAL HYPERTENSION: ICD-10-CM

## 2024-12-09 RX ORDER — AMLODIPINE BESYLATE 5 MG/1
5 TABLET ORAL DAILY
Qty: 90 TABLET | Refills: 3 | Status: SHIPPED | OUTPATIENT
Start: 2024-12-09 | End: 2024-12-13 | Stop reason: SDUPTHER

## 2024-12-12 ENCOUNTER — LAB VISIT (OUTPATIENT)
Dept: LAB | Facility: HOSPITAL | Age: 64
End: 2024-12-12
Attending: FAMILY MEDICINE
Payer: COMMERCIAL

## 2024-12-12 DIAGNOSIS — M10.09 ACUTE IDIOPATHIC GOUT OF MULTIPLE SITES: ICD-10-CM

## 2024-12-12 DIAGNOSIS — R79.89 ELEVATED LIVER FUNCTION TESTS: ICD-10-CM

## 2024-12-12 DIAGNOSIS — E78.2 MIXED HYPERLIPIDEMIA: ICD-10-CM

## 2024-12-12 DIAGNOSIS — I10 ESSENTIAL HYPERTENSION: ICD-10-CM

## 2024-12-12 DIAGNOSIS — Z13.29 SCREENING FOR THYROID DISORDER: ICD-10-CM

## 2024-12-12 DIAGNOSIS — R73.03 PRE-DIABETES: Chronic | ICD-10-CM

## 2024-12-12 LAB
ALBUMIN SERPL-MCNC: 4.4 G/DL (ref 3.4–4.8)
ALBUMIN/GLOB SERPL: 1.2 RATIO (ref 1.1–2)
ALP SERPL-CCNC: 77 UNIT/L (ref 40–150)
ALT SERPL-CCNC: 55 UNIT/L (ref 0–55)
ANION GAP SERPL CALC-SCNC: 10 MEQ/L
AST SERPL-CCNC: 50 UNIT/L (ref 5–34)
BASOPHILS # BLD AUTO: 0.05 X10(3)/MCL
BASOPHILS NFR BLD AUTO: 1 %
BILIRUB SERPL-MCNC: 0.6 MG/DL
BILIRUB UR QL STRIP.AUTO: NEGATIVE
BUN SERPL-MCNC: 10 MG/DL (ref 9.8–20.1)
CALCIUM SERPL-MCNC: 9.7 MG/DL (ref 8.4–10.2)
CHLORIDE SERPL-SCNC: 105 MMOL/L (ref 98–107)
CHOLEST SERPL-MCNC: 127 MG/DL
CHOLEST/HDLC SERPL: 3 {RATIO} (ref 0–5)
CLARITY UR: CLEAR
CO2 SERPL-SCNC: 26 MMOL/L (ref 23–31)
COLOR UR AUTO: YELLOW
CREAT SERPL-MCNC: 0.8 MG/DL (ref 0.55–1.02)
CREAT UR-MCNC: 218.6 MG/DL (ref 45–106)
CREAT/UREA NIT SERPL: 13
EOSINOPHIL # BLD AUTO: 0.1 X10(3)/MCL (ref 0–0.9)
EOSINOPHIL NFR BLD AUTO: 2.1 %
ERYTHROCYTE [DISTWIDTH] IN BLOOD BY AUTOMATED COUNT: 12.3 % (ref 11.5–17)
EST. AVERAGE GLUCOSE BLD GHB EST-MCNC: 131.2 MG/DL
GFR SERPLBLD CREATININE-BSD FMLA CKD-EPI: >60 ML/MIN/1.73/M2
GLOBULIN SER-MCNC: 3.6 GM/DL (ref 2.4–3.5)
GLUCOSE SERPL-MCNC: 138 MG/DL (ref 82–115)
GLUCOSE UR QL STRIP: NEGATIVE
HBA1C MFR BLD: 6.2 %
HCT VFR BLD AUTO: 39.7 % (ref 37–47)
HDLC SERPL-MCNC: 48 MG/DL (ref 35–60)
HGB BLD-MCNC: 13.7 G/DL (ref 12–16)
HGB UR QL STRIP: NEGATIVE
IMM GRANULOCYTES # BLD AUTO: 0.02 X10(3)/MCL (ref 0–0.04)
IMM GRANULOCYTES NFR BLD AUTO: 0.4 %
KETONES UR QL STRIP: NEGATIVE
LDLC SERPL CALC-MCNC: 47 MG/DL (ref 50–140)
LEUKOCYTE ESTERASE UR QL STRIP: NEGATIVE
LYMPHOCYTES # BLD AUTO: 1.82 X10(3)/MCL (ref 0.6–4.6)
LYMPHOCYTES NFR BLD AUTO: 37.8 %
MCH RBC QN AUTO: 32.2 PG (ref 27–31)
MCHC RBC AUTO-ENTMCNC: 34.5 G/DL (ref 33–36)
MCV RBC AUTO: 93.4 FL (ref 80–94)
MICROALBUMIN UR-MCNC: 25.7 UG/ML
MICROALBUMIN/CREAT RATIO PNL UR: 11.8 MG/GM CR (ref 0–30)
MONOCYTES # BLD AUTO: 0.51 X10(3)/MCL (ref 0.1–1.3)
MONOCYTES NFR BLD AUTO: 10.6 %
NEUTROPHILS # BLD AUTO: 2.31 X10(3)/MCL (ref 2.1–9.2)
NEUTROPHILS NFR BLD AUTO: 48.1 %
NITRITE UR QL STRIP: NEGATIVE
NRBC BLD AUTO-RTO: 0 %
PH UR STRIP: 6 [PH]
PLATELET # BLD AUTO: 252 X10(3)/MCL (ref 130–400)
PMV BLD AUTO: 9.2 FL (ref 7.4–10.4)
POTASSIUM SERPL-SCNC: 3.7 MMOL/L (ref 3.5–5.1)
PROT SERPL-MCNC: 8 GM/DL (ref 5.8–7.6)
PROT UR QL STRIP: NEGATIVE
RBC # BLD AUTO: 4.25 X10(6)/MCL (ref 4.2–5.4)
SODIUM SERPL-SCNC: 141 MMOL/L (ref 136–145)
SP GR UR STRIP.AUTO: 1.02 (ref 1–1.03)
TRIGL SERPL-MCNC: 159 MG/DL (ref 37–140)
TSH SERPL-ACNC: 2.07 UIU/ML (ref 0.35–4.94)
URATE SERPL-MCNC: 3.2 MG/DL (ref 2.6–6)
UROBILINOGEN UR STRIP-ACNC: 0.2
VLDLC SERPL CALC-MCNC: 32 MG/DL
WBC # BLD AUTO: 4.81 X10(3)/MCL (ref 4.5–11.5)

## 2024-12-12 PROCEDURE — 80061 LIPID PANEL: CPT

## 2024-12-12 PROCEDURE — 81003 URINALYSIS AUTO W/O SCOPE: CPT

## 2024-12-12 PROCEDURE — 82570 ASSAY OF URINE CREATININE: CPT

## 2024-12-12 PROCEDURE — 85025 COMPLETE CBC W/AUTO DIFF WBC: CPT

## 2024-12-12 PROCEDURE — 83036 HEMOGLOBIN GLYCOSYLATED A1C: CPT

## 2024-12-12 PROCEDURE — 84443 ASSAY THYROID STIM HORMONE: CPT

## 2024-12-12 PROCEDURE — 80053 COMPREHEN METABOLIC PANEL: CPT

## 2024-12-12 PROCEDURE — 36415 COLL VENOUS BLD VENIPUNCTURE: CPT

## 2024-12-12 PROCEDURE — 84550 ASSAY OF BLOOD/URIC ACID: CPT

## 2024-12-13 ENCOUNTER — OFFICE VISIT (OUTPATIENT)
Dept: FAMILY MEDICINE | Facility: CLINIC | Age: 64
End: 2024-12-13
Payer: COMMERCIAL

## 2024-12-13 VITALS
OXYGEN SATURATION: 99 % | SYSTOLIC BLOOD PRESSURE: 126 MMHG | DIASTOLIC BLOOD PRESSURE: 84 MMHG | HEIGHT: 63 IN | RESPIRATION RATE: 18 BRPM | HEART RATE: 80 BPM | BODY MASS INDEX: 30.62 KG/M2 | WEIGHT: 172.81 LBS

## 2024-12-13 DIAGNOSIS — K21.9 GASTROESOPHAGEAL REFLUX DISEASE WITHOUT ESOPHAGITIS: ICD-10-CM

## 2024-12-13 DIAGNOSIS — M10.09 ACUTE IDIOPATHIC GOUT OF MULTIPLE SITES: ICD-10-CM

## 2024-12-13 DIAGNOSIS — M17.11 PRIMARY OSTEOARTHRITIS OF RIGHT KNEE: ICD-10-CM

## 2024-12-13 DIAGNOSIS — E11.51 TYPE 2 DIABETES MELLITUS WITH DIABETIC PERIPHERAL ANGIOPATHY WITHOUT GANGRENE, WITHOUT LONG-TERM CURRENT USE OF INSULIN: ICD-10-CM

## 2024-12-13 DIAGNOSIS — Z00.00 WELLNESS EXAMINATION: Primary | ICD-10-CM

## 2024-12-13 DIAGNOSIS — E78.2 MIXED HYPERLIPIDEMIA: ICD-10-CM

## 2024-12-13 DIAGNOSIS — I10 ESSENTIAL HYPERTENSION: ICD-10-CM

## 2024-12-13 RX ORDER — SEMAGLUTIDE 0.68 MG/ML
0.5 INJECTION, SOLUTION SUBCUTANEOUS
Qty: 3 ML | Refills: 0 | Status: SHIPPED | OUTPATIENT
Start: 2024-12-13 | End: 2025-01-12

## 2024-12-13 NOTE — PROGRESS NOTES
Patient ID: [id number]     Chief Complaint: Annual Exam        HPI:     Nehal Araujo is a 64 y.o. female here today for an annual wellness. Reviewed and discussed lab results.   Since last visit patient is continuing to try to make diet and lifestyle modifications in order to increase quality of life.  Has recently started a job.  Very happy with her job.  Patient has been taking metformin 500 mg-is interested in starting medications for type 2 diabetes that also address weight-concerns related to weight discussed.  1) Hypertension: Patient has been taking medicine daily. BP is normal at home. No symptoms associated with increased BP such as headache/ visual changes/ dizziness/ chest pain.   2)  Reflux: Patient is continuing to take Prilosec-no symptoms associated with reflux.  No noticeable side effects of medication.  3) Hyperlipidemia: Patient is taking Lipitor 40 mg. No side effects of medication noted.    Past Medical History:   Diagnosis Date    Acute idiopathic gout of multiple sites     GERD (gastroesophageal reflux disease)     History of kidney stones 2023    Lumbar degenerative disc disease 2023    Pre-diabetes 2022    Primary osteoarthritis of right knee 2023        Past Surgical History:   Procedure Laterality Date    ARTHROSCOPY,KNEE,WITH MENISCUS REPAIR Right 2023    Procedure: ARTHROSCOPY,KNEE,WITH MENISCUS REPAIR;  Surgeon: Shay Daniels Jr., MD;  Location: [facility];  Service: Orthopedics;  Laterality: Right;    BUNIONECTOMY Bilateral      SECTION      x 3    CHOLECYSTECTOMY      CYSTOURETEROSCOPY,WITH HOLMIUM LASER LITHOTRIPSY OF URETERAL CALCULUS N/A 2023    Procedure: CYSTOURETEROSCOPY,WITH HOLMIUM LASER LITHOTRIPSY OF URETERAL CALCULUS;  Surgeon: Isrrael Rachel MD;  Location: [facility] OR;  Service: Urology;  Laterality: N/A;    ENDOMETRIAL ABLATION      LAPAROSCOPIC PARTIAL COLECTOMY      thumb surgery Left     carpal - metacarpal joint replacement     TONSILLECTOMY      URETERAL STENT PLACEMENT Right 2023    Procedure: INSERTION, STENT, URETER;  Surgeon: Isrrael Rachel MD;  Location: [facility];  Service: Urology;  Laterality: Right;        Social History     Tobacco Use    Smoking status: Former     Current packs/day: 0.00     Average packs/day: 0.8 packs/day for 15.0 years (11.3 ttl pk-yrs)     Types: Cigarettes     Start date:      Quit date:      Years since quittin.9    Smokeless tobacco: Never   Substance Use Topics    Alcohol use: Yes     Alcohol/week: 5.0 standard drinks of alcohol     Types: 5 Drinks containing 0.5 oz of alcohol per week     Comment: Socially    Drug use: Never        Social History     Socioeconomic History    Marital status:      Spouse name: Hardik    Number of children: 3        Current Outpatient Medications   Medication Instructions    amLODIPine (NORVASC) 10 mg, Oral, Daily    atorvastatin (LIPITOR) 40 mg, Oral, Daily    blood sugar diagnostic (ONETOUCH VERIO TEST STRIPS) Strp 50 each, Misc.(Non-Drug; Combo Route), 2 times daily, Check glucose levels twice a day    febuxostat (ULORIC) 40 mg, Oral, Daily    lancets (ONETOUCH DELICA PLUS LANCET) 33 gauge Misc Use to check blood glucose levels twice daily    magnesium oxide (MAG-OX) 400 mg (241.3 mg magnesium) tablet Take 1 tablet by mouth once daily    meloxicam (MOBIC) 15 mg, Oral, Daily    metFORMIN (GLUCOPHAGE) 500 mg, Oral, With breakfast    multivit with calcium,iron,min (WOMEN'S MULTIPLE VITAMINS ORAL) Take by mouth.    omeprazole (PRILOSEC) 20 mg, Oral, Daily    OZEMPIC 0.5 mg, Subcutaneous, Every 7 days    triamcinolone (NASACORT) 55 mcg nasal inhaler 2 sprays, Nasal, Daily       Review of patient's allergies indicates:   Allergen Reactions    Allopurinol analogues Rash    Hydrochlorothiazide Palpitations    Percocet [oxycodone-acetaminophen] Itching        Patient Care Team:  Angela Baez MD as PCP - General (Family Medicine)  Faizan  "Sandy as   Isrrael Rachel MD as Consulting Physician (Urology)  [facility]-Referrals, [facility]  Tavo Casper MD as Consulting Physician (Rheumatology)  Angela Joyce MD (Dermatology)  Eusebio Mckeon MD as Consulting Physician (Gastroenterology)  Tucker Wayne LPN as Care Coordinator  1, [facility] [place] as Hypertension Digital Medicine Contract  Gabriele Moreno PharmD as Hypertension Digital Medicine Clinician (Pharmacist)  Keli Denton MD as Hypertension Digital Medicine Responsible Provider (Internal Medicine)     Subjective:     Review of Systems    12 point review of systems conducted, negative except as stated in the history of present illness. See HPI for details.      Objective:     Visit Vitals  /84 (BP Location: Left arm, Patient Position: Sitting)   Pulse 80   Resp 18   Ht 5' 3" (1.6 m)   Wt 78.4 kg (172 lb 12.8 oz)   SpO2 99%   BMI 30.61 kg/m²       Physical Exam    General: Alert and oriented, No acute distress.  Head: Normocephalic, Atraumatic.  Eye: Pupils are equal, round and reactive to light, Extraocular movements are intact, Sclera non-icteric.  Ears/Nose/Throat: Normal, Mucosa moist,Clear.  Neck/Thyroid: Supple, Non-tender, No carotid bruit, No palpable thyromegaly or thyroid nodule, No lymphadenopathy, No JVD, Full range of motion.  Respiratory: Clear to auscultation bilaterally; No wheezes, rales or rhonchi,Non-labored respirations, Symmetrical chest wall expansion.  Cardiovascular: Regular rate and rhythm, S1/S2 normal  Gastrointestinal: Soft, Non-tender, Non-distended, Normal bowel sounds, No palpable organomegaly.  Musculoskeletal: Normal range of motion.  Integumentary: Warm, Dry, Intact, No suspicious lesions or rashes.  Extremities: No clubbing, cyanosis or edema  Protective Sensation (w/ 10 gram monofilament):  Right: Intact  Left: Intact    Visual Inspection:  Normal -  Bilateral    Pedal Pulses:   Right: Present  Left: " "Present    Posterior Tibialis Pulses:   Right:Present  Left: Present     Neurologic: No focal deficits, Cranial Nerves II-XII are grossly intact, Motor strength normal upper and lower extremities, Sensory exam intact.  Psychiatric: Normal interaction, Coherent speech, Euthymic mood, Appropriate affect       Assessment:       ICD-10-CM ICD-9-CM   1. Wellness examination  Z00.00 V70.0   2. Essential hypertension  I10 401.9   3. Mixed hyperlipidemia  E78.2 272.2   4. Gastroesophageal reflux disease without esophagitis  K21.9 530.81   5. Acute idiopathic gout of multiple sites  M10.09 274.01   6. Primary osteoarthritis of right knee  M17.11 715.16   7. Type 2 diabetes mellitus with diabetic peripheral angiopathy without gangrene, without long-term current use of insulin  E11.51 250.70     443.81        Plan:       Cervical Cancer Screening - Pap Up to date     Breast Cancer Screening - Mammogram Up to date     Colon Cancer Screening - Colon cancer screening referral completed    Eye Exam - Recommend annually.    Dental Exam - Recommend biannual exams.     Vaccinations -   Immunization History   Administered Date(s) Administered    COVID-19 MRNA, LN-S PF (MODERNA HALF 0.25 ML DOSE) 05/02/2022    COVID-19, mRNA, LNP-S, PF, iesha-sucrose, 30 mcg/0.3 mL (Pfizer Ages 12+) 10/11/2023    Influenza - Quadrivalent - PF *Preferred* (6 months and older) 12/08/2022, 10/11/2023    RSVpreF (Arexvy) 04/15/2024    Tdap 02/01/2019    Zoster Recombinant 10/11/2023, 04/15/2024    Immunization guidelines reviewed with the patient.  Encourage patient to prevent disease through immunizations.    1. Wellness examination (Primary)  Wellness: Five Rules Reviewed:  Healthy community-Relationships/ Water/Nutrition-Real Food, Limit Fast Food/ Exercise 20-30 minutes most days of the week/ Mental health- "Is your work a calling or paycheck" Define 'What is your purpose-what matters to you' Stress- Is an Individual Response- Therefore Can be " Controlled by the Individual. Meditation/ Immunizations/Multivitamin use-Vitamin D3/ Screenings   - Ambulatory referral/consult to Gastroenterology; Future  - Mammo Digital Screening Bilat w/ Guy; Future    2. Type 2 diabetes mellitus with diabetic peripheral angiopathy without gangrene, without long-term current use of insulin  Lab Results   Component Value Date    HGBA1C 6.2 12/12/2024    HGBA1C 6.5 06/03/2024    LDL 47.00 (L) 12/12/2024    CREATININE 0.80 12/12/2024      Pathophysiology/treatment/dangers discussed.   Patient to stop metformin-Rx Ozempic-risks versus benefits discussed at length-patient feels benefits outweigh risk.  Blood sugar goal of less than 120 fasting and 140-150 about 2 hours after meal.   Current HA1C is 6.2. Hemoglobin A1c needs to be rechecked in 3 months. Goal less than 6.5.  Follow ADA Diet. Avoid soda, simple sweets, and limit rice/pasta/breads/starches (no more than 45-50 grams per meal).  Maintain healthy weight with goal BMI <30.  Exercise 5 times per week for 30 minutes per day.  Stressed importance of daily foot exams.Stressed importance of annual dilated eye exam.   - semaglutide (OZEMPIC) 0.25 mg or 0.5 mg (2 mg/3 mL) pen injector; Inject 0.5 mg into the skin every 7 days.  Dispense: 3 mL; Refill: 0  - CBC Auto Differential; Future  - Comprehensive Metabolic Panel; Future  - Hemoglobin A1C; Future    3. Essential hypertension  Patient has been taking medication-Norvasc 5 mg. Blood pressure goal of less than 130/80-blood pressure is stable. Continue to encourage diet and activity modifications. Including stress management. Pathophysiology/treatment/dangers discussed.    4. Mixed hyperlipidemia  Lab Results   Component Value Date    CHOL 127 12/12/2024    LDL 47.00 (L) 12/12/2024    TRIG 159 (H) 12/12/2024    HDL 48 12/12/2024     Pathophysiology/treatment/dangers discussed. Patient to continue diet modification-Lipitor 40 mg-Co Q10 200.   Total cholesterol 127 ( Goal less than  200) HDL 48 ( Goal high as possible) LDL 47 ( Goal less than 70) Triglycerides 48 ( Goal less than 150)     5. Gastroesophageal reflux disease without esophagitis  Pathophysiology/treatment/dangers discussed.Patient is taking omeprazole 20 mg.    6. Acute idiopathic gout of multiple sites  Patient is currently stable.  No acute modifications recommended.  Continue with current treatment.    - Uric Acid; Future    7. Primary osteoarthritis of right knee  Patient is currently stable.  No acute modifications recommended.  Continue with lifestyle modifications.         The patient's Health Maintenance was reviewed and the following appears to be due at this time:   Health Maintenance Due   Topic Date Due    Colorectal Cancer Screening  Never done    Mammogram  01/23/2025         Follow up in about 3 months (around 3/13/2025) for Diabetes. In addition to their scheduled follow up, the patient has also been instructed to follow up on as needed basis.     Future Appointments   Date Time Provider Department Center   3/14/2025 11:00 AM [facility] [place] [place]        Angela Baez MD

## 2024-12-16 ENCOUNTER — PATIENT OUTREACH (OUTPATIENT)
Facility: CLINIC | Age: 64
End: 2024-12-16
Payer: COMMERCIAL

## 2024-12-16 NOTE — LETTER
AUTHORIZATION FOR RELEASE OF CONFIDENTIAL INFORMATION      2024      Dear [facility],    We are seeing Nehal Araujo, date of birth 1960, in the clinic at [facility].  Angela Baez MD is the patient's PCP. Nehal Araujo has an outstanding lab/procedure at the time we reviewed his chart.  In order to help keep her health information updated, Nehal has authorized us to request the following medical record(s):          Eye Exam - including evaluation for diabetic retinopathy      Please fax any records to Angela Baez MD's at  [phone]    If you have any questions, please contact  Anuj DOTY,CCC @ [phone]             Patient Name: Nehal Araujo  :1960  Patient Phone #:[phone]           Nehal Araujo  MRN: [id number]  : 1960  Age: 63 y.o.  Sex: female         Patient/Legal Guardian Signature  This signature was collected at 2024    Nehal Araujo     Self  _______________________________   Printed Name/Relationship to Patient      Consent for Examination and Treatment: I hereby authorize the providers and employees of [facility] ([facility]) to provide medical treatment/services which includes, but is not limited to, performing and administering tests and diagnostic procedures that are deemed necessary, including, but not limited to, imaging examinations, blood tests and other laboratory procedures as may be required by the hospital, clinic, or may be ordered by my physician(s) or persons working under the general and/or special instructions of my physician(s).      I understand and agree that this consent covers all authorized persons, including but not limited to physicians, residents, nurse practitioners, physicians' assistants, specialists, consultants, student nurses, and independently contracted physicians, who are called upon by the physician in charge, to carry out the diagnostic procedures and medical or surgical treatment.      I hereby authorize [facility] to retain or dispose of any specimens or tissue, should there be such remaining from any test or procedure.     I hereby authorize and give consent for [facility] providers and employees to take photographs, images or videotapes of such diagnostic, surgical or treatment procedures of Patient as may be required by [facility] or as may be ordered by a physician. I further acknowledge and agree that [facility] may use cameras or other devices for patient monitoring.     I am aware that the practice of medicine is not an exact science, and I acknowledge that no guarantees have been made to me as to the outcome of any tests, procedures or treatment.     Authorization for Release of Information: I understand that my insurance company and/or their agents may need information necessary to make determinations about payment/reimbursement. I hereby provide authorization to release to all insurance companies, their successors, assignees, other parties with whom they may have contracted, or others acting on their behalf, that are involved with payment for any hospital and/or clinic charges incurred by the patient, any information that they request and deem necessary for payment/reimbursement, and/or quality review.  I further authorize the release of my health information to physicians or other health care practitioners on staff who are involved in my health care now and in the future, and to other health care providers, entities, or institutions for the purpose of my continued care and treatment, including referrals.     REGISTRATION AUTHORIZATION  Form No. 39679 (Rev. 3/25/2024)    Page 1 of 3                       Medicare Patient's Certification and Authorization to Release Information and Payment Request:  I certify that the information given by me in applying for payment under Title XVIII of the Social Security Act is correct. I authorize any malone of medical or other information about me to release to  the Social SecurityBellflower Medical Centerinistration, or its intermediaries or carriers, any information needed for this or a related Medicare claim. I request that payment of authorized benefits be made on my behalf.     Assignment of Insurance Benefits:   I hereby authorize any and all insurance companies, health plans, defined   benefit plans, health insurers or any entity that is or may be responsible for payment of my medical expenses to pay all hospital and medical benefits now due, and to become due and payable to me under any hospital benefits, sick benefits, injury benefits or any other benefit for services rendered to me, including Major Medical Benefits, direct to [facility] and all independently contracted physicians. I assign any and all rights that I may have against any and all insurance companies, health plans, defined benefit plans, health insurers or any entity that is or may be responsible for payment of my medical expenses, including, but not limited to any right to appeal a denial of a claim, any right to bring any action, lawsuit, administrative proceeding, or other cause of action on my behalf. I specifically assign my right to pursue litigation against any and all insurance companies, health plans, defined benefit plans, health insurers or any entity that is or may be responsible for payment of my medical expenses based upon a refusal to pay charges.            E. Valuables: It is understood and agreed that [facility] is not liable for the damage to or loss of any money, jewelry,   documents, dentures, eye glasses, hearing aids, prosthetics, or other property of value.     F. Computer Equipment: I understand and agree that should I choose to use computer equipment owned by [facility] or if I choose to access the Internet via [facility]s network, I do so at my own risk. [facility] is not responsible for any damage to my computer equipment or to any damages of any type that might arise from my loss of equipment or data.     G.  Acceptance of Financial Responsibility:  I agree that in consideration of the services and   supplies that have been   or will be furnished to the patient, I am hereby obligated to pay all charges made for or on the account of the patient according to the standard rates (in effect at the time the services and supplies are delivered) established by [facility], including its Patient Financial Assistance Policy to the extent it is applicable. I understand that I am responsible for all charges, or portions thereof, not covered by insurance or other sources. Patient refunds will be distributed only after balances at all [facility] facilities are paid.     H. Communication Authorization:  I hereby authorize [facility] and its representatives, along with any billing service   or  who may work on their behalf, to contact me on   my cell phone and/or home phone using pre- recorded messages, artificial voice messages, automatic telephone dialing devices or other computer assisted technology, or by electronic      mail, text messaging, or by any other form of electronic communication. This includes, but is not limited to, appointment reminders, yearly physical exam reminders, preventive care reminders, patient campaigns, welcome calls, and calls about account balances on my account or any account on which I am listed as a guarantor. I understand I have the right to opt out of these communications at any time.      Relationship  Between  Facility and  Provider:      I understand that some, but not all, providers furnishing services to the patient are not employees or agents of [facility]. The patient is under the care and supervision of his/her attending physician, and it is the responsibility of the facility and its nursing staff to carry out the instructions of such physicians. It is the responsibility of the patient's physician/designee to obtain the patient's informed consent, when required, for medical or surgical  treatment, special diagnostic or therapeutic procedures, or hospital services rendered for the patient under the special instructions of the physician/designee.           REGISTRATION AUTHORIZATION  Form No. 69250 (Rev. 3/25/2024)    Page 2 of 3                       Immunizations: [facility] shares immunization information with state sponsored health departments to help you and your doctor keep track of your immunization records. By signing, you consent to have this information shared with the health department in your state:                                [place] - LINKS ([place] Immunization Network for Kids Statewide)                                [place] - MIIX ([place] Immunization Information eXchange)                                [place] - ImmPRINT (Immunization Patient Registry with Integrated Technology)     TERM: This authorization is valid for this and subsequent care/treatment I receive at [facility] and will remain valid unless/until revoked in writing by me.     [facility]: As used in this document, [facility] means all [facility] owned and managed facilities, including, but not limited to, all health centers, surgery centers, clinics, urgent care centers, and hospitals.         [facility] complies with applicable Federal civil rights laws and does not discriminate on the basis of race, color, national origin, age, disability, or sex.  ATENCIÓN: si habla español, tiene a young disposición servicios gratuitos de asistencia lingüística. Leon rae [phone].  CHÚ Ý: N?u b?n nói Ti?ng Vi?t, có các d?ch v? h? tr? ngôn ng? mi?n phí dành cho b?n. G?i s? [phone].        REGISTRATION AUTHORIZATION  Form No. 33003 (Rev. 3/25/2024)   Page 3 of 3

## 2024-12-16 NOTE — PROGRESS NOTES
Health Maintenance Topic(s) Outreach Outcomes & Actions Taken:    Eye Exam - Outreach Outcomes & Actions Taken  : External Records Requested & Care Team Updated if Applicable       Additional Notes:  Request Diabetic Eye Exam: [facility]

## 2024-12-19 NOTE — PROGRESS NOTES
Health Maintenance Topic(s) Outreach Outcomes & Actions Taken:    Eye Exam - Outreach Outcomes & Actions Taken  : Diabetic Eye External Records Uploaded, Care Team & History Updated if Applicable       Additional Notes:  Diabetic Eye Exam 7/16/24

## 2025-01-02 DIAGNOSIS — K21.9 GASTROESOPHAGEAL REFLUX DISEASE, UNSPECIFIED WHETHER ESOPHAGITIS PRESENT: ICD-10-CM

## 2025-01-02 RX ORDER — OMEPRAZOLE 20 MG/1
20 CAPSULE, DELAYED RELEASE ORAL DAILY
Qty: 30 CAPSULE | Refills: 3 | Status: SHIPPED | OUTPATIENT
Start: 2025-01-02

## 2025-01-06 ENCOUNTER — OFFICE VISIT (OUTPATIENT)
Dept: ORTHOPEDICS | Facility: CLINIC | Age: 65
End: 2025-01-06
Payer: COMMERCIAL

## 2025-01-06 ENCOUNTER — HOSPITAL ENCOUNTER (OUTPATIENT)
Dept: RADIOLOGY | Facility: CLINIC | Age: 65
Discharge: HOME | End: 2025-01-06
Attending: PHYSICIAN ASSISTANT
Payer: COMMERCIAL

## 2025-01-06 VITALS — HEART RATE: 90 BPM | SYSTOLIC BLOOD PRESSURE: 136 MMHG | DIASTOLIC BLOOD PRESSURE: 81 MMHG

## 2025-01-06 DIAGNOSIS — S92.352D CLOSED FRACTURE OF BASE OF FIFTH METATARSAL BONE OF LEFT FOOT WITH ROUTINE HEALING: Primary | ICD-10-CM

## 2025-01-06 DIAGNOSIS — S92.352D CLOSED FRACTURE OF BASE OF FIFTH METATARSAL BONE OF LEFT FOOT WITH ROUTINE HEALING: ICD-10-CM

## 2025-01-06 PROCEDURE — 1159F MED LIST DOCD IN RCRD: CPT | Mod: CPTII,,, | Performed by: PHYSICIAN ASSISTANT

## 2025-01-06 PROCEDURE — 73630 X-RAY EXAM OF FOOT: CPT | Mod: LT,,, | Performed by: PHYSICIAN ASSISTANT

## 2025-01-06 PROCEDURE — 1160F RVW MEDS BY RX/DR IN RCRD: CPT | Mod: CPTII,,, | Performed by: PHYSICIAN ASSISTANT

## 2025-01-06 PROCEDURE — 3075F SYST BP GE 130 - 139MM HG: CPT | Mod: CPTII,,, | Performed by: PHYSICIAN ASSISTANT

## 2025-01-06 PROCEDURE — 99213 OFFICE O/P EST LOW 20 MIN: CPT | Mod: ,,, | Performed by: PHYSICIAN ASSISTANT

## 2025-01-06 PROCEDURE — 4010F ACE/ARB THERAPY RXD/TAKEN: CPT | Mod: CPTII,,, | Performed by: PHYSICIAN ASSISTANT

## 2025-01-06 PROCEDURE — 3079F DIAST BP 80-89 MM HG: CPT | Mod: CPTII,,, | Performed by: PHYSICIAN ASSISTANT

## 2025-01-11 DIAGNOSIS — E11.51 TYPE 2 DIABETES MELLITUS WITH DIABETIC PERIPHERAL ANGIOPATHY WITHOUT GANGRENE, WITHOUT LONG-TERM CURRENT USE OF INSULIN: ICD-10-CM

## 2025-01-13 RX ORDER — SEMAGLUTIDE 0.68 MG/ML
0.5 INJECTION, SOLUTION SUBCUTANEOUS
Qty: 3 ML | Refills: 0 | Status: SHIPPED | OUTPATIENT
Start: 2025-01-13 | End: 2025-02-12

## 2025-01-24 ENCOUNTER — LAB VISIT (OUTPATIENT)
Dept: LAB | Facility: HOSPITAL | Age: 65
End: 2025-01-24
Attending: INTERNAL MEDICINE
Payer: COMMERCIAL

## 2025-01-24 DIAGNOSIS — I10 ESSENTIAL HYPERTENSION: ICD-10-CM

## 2025-01-24 LAB
ANION GAP SERPL CALC-SCNC: 12 MEQ/L
BUN SERPL-MCNC: 10 MG/DL (ref 9.8–20.1)
CALCIUM SERPL-MCNC: 9.6 MG/DL (ref 8.4–10.2)
CHLORIDE SERPL-SCNC: 104 MMOL/L (ref 98–107)
CO2 SERPL-SCNC: 25 MMOL/L (ref 23–31)
CREAT SERPL-MCNC: 0.82 MG/DL (ref 0.55–1.02)
CREAT/UREA NIT SERPL: 12
GFR SERPLBLD CREATININE-BSD FMLA CKD-EPI: >60 ML/MIN/1.73/M2
GLUCOSE SERPL-MCNC: 130 MG/DL (ref 82–115)
POTASSIUM SERPL-SCNC: 3.7 MMOL/L (ref 3.5–5.1)
SODIUM SERPL-SCNC: 141 MMOL/L (ref 136–145)

## 2025-01-24 PROCEDURE — 80048 BASIC METABOLIC PNL TOTAL CA: CPT

## 2025-01-24 PROCEDURE — 36415 COLL VENOUS BLD VENIPUNCTURE: CPT

## 2025-02-03 NOTE — PROGRESS NOTES
Chief Complaint:   Chief Complaint   Patient presents with    Ankle Pain     L foot pain - states pain increases throughout the day. At times it will swell all over the foot.        History of present illness:    This is a 64 y.o. year old female who complains of follow up from left 5th metatarsal fracture.    Patient states she does have some mild discomfort at times but overall she is making good improvement      Current Outpatient Medications   Medication Sig    atorvastatin (LIPITOR) 40 MG tablet Take 1 tablet (40 mg total) by mouth once daily.    blood sugar diagnostic (ONETOUCH VERIO TEST STRIPS) Strp 50 each by Misc.(Non-Drug; Combo Route) route 2 (two) times a day. Check glucose levels twice a day    febuxostat (ULORIC) 40 mg Tab Take 1 tablet (40 mg total) by mouth once daily.    lancets (ONETOUCH DELICA PLUS LANCET) 33 gauge Misc Use to check blood glucose levels twice daily    magnesium oxide (MAG-OX) 400 mg (241.3 mg magnesium) tablet Take 1 tablet by mouth once daily    meloxicam (MOBIC) 15 MG tablet Take 1 tablet (15 mg total) by mouth once daily.    multivit with calcium,iron,min (WOMEN'S MULTIPLE VITAMINS ORAL) Take by mouth.    omeprazole (PRILOSEC) 20 MG capsule Take 1 capsule (20 mg total) by mouth once daily.    triamcinolone (NASACORT) 55 mcg nasal inhaler 2 sprays by Nasal route once daily.    amlodipine-valsartan (EXFORGE)  mg per tablet Take 1 tablet by mouth once daily.    metFORMIN (GLUCOPHAGE) 500 MG tablet Take 1 tablet (500 mg total) by mouth daily with breakfast. (Patient not taking: Reported on 1/6/2025)    semaglutide (OZEMPIC) 0.25 mg or 0.5 mg (2 mg/3 mL) pen injector Inject 0.5 mg into the skin every 7 days.    triamcinolone acetonide 0.1% (KENALOG) 0.1 % cream Apply 1 application on the skin twice a day as needed; apply to arms, legs trunk     No current facility-administered medications for this visit.       Review of Systems:    Constitution:   Denies chills, fever, and  sweats.  HENT:   Denies headaches or blurry vision.  Cardiovascular:  Denies chest pain or irregular heart beat.  Respiratory:   Denies cough or shortness of breath.  Gastrointestinal:  Denies abdominal pain, nausea, or vomiting.  Musculoskeletal:   Denies muscle cramps.  Neurological:   Denies dizziness or focal weakness.  Psychiatric/Behavior: Normal mental status.  Hematology/Lymph:  Denies bleeding problem or easy bruising/bleeding.  Skin:    Denies rash or suspicious lesions.    Examination:    Vital Signs:    Vitals:    01/06/25 1346   BP: 136/81   Pulse: 90       There is no height or weight on file to calculate BMI.    Constitution:   Well-developed, well nourished patient in no acute distress.  Neurological:   Alert and oriented x 3 and cooperative to examination.     Psychiatric/Behavior: Normal mental status.  Respiratory:   No shortness of breath.  Eyes:    Extraoccular muscles intact  Skin:    No scars, rash or suspicious lesions.    Physical Exam:   Left foot and ankle  No obvious deformity. Plantar flexion and dorsiflexion is 60 degrees and 30 degrees, respectively.  Negative squeeze test.  Negative lateral malleolus tenderness.  Negative medial malleolus tenderness.  Negative talofibular ligament tenderness.  Negative anterior draw and lateral tilt.  5/5 strength, normal skin appearance and palpable pulses distally.  Sensibility normal.  Resolved tenderness to palpation over the base of the 5th metatarsal   Resolved edema and ecchymosis about the lateral aspect of the foot as well   Intact motor and sensation to the foot and toes ankles   Brisk capillary refill to all the digits.      Imaging: X-rays ordered and images interpreted today personally by me of left foot three views   Patient has a healing 5th metatarsal fracture of the base   There is no other osseous abnormalities noted        Assessment: Closed fracture of base of fifth metatarsal bone of left foot with routine healing  -     X-Ray  Foot Complete Left; Future; Expected date: 01/06/2025         Plan:  At this point the patient can continue to wean herself out of her postop shoe and wear regular shoes at her comfort level   She can increase activities as tolerated pain   In the next 4-6 weeks if her pain does not continue to dissipate completely she can call the office if we can repeat an x-ray to make sure that she has not developed a nonunion but otherwise he thinks she is she had recovered from this completely          DISCLAIMER: This note may have been dictated using voice recognition software and may contain grammatical errors.     NOTE: Consult report sent to referring provider via [facility].

## 2025-02-19 DIAGNOSIS — E11.51 TYPE 2 DIABETES MELLITUS WITH DIABETIC PERIPHERAL ANGIOPATHY WITHOUT GANGRENE, WITHOUT LONG-TERM CURRENT USE OF INSULIN: ICD-10-CM

## 2025-02-20 RX ORDER — SEMAGLUTIDE 0.68 MG/ML
0.5 INJECTION, SOLUTION SUBCUTANEOUS
Qty: 3 ML | Refills: 0 | Status: SHIPPED | OUTPATIENT
Start: 2025-02-20 | End: 2025-03-22

## 2025-03-06 ENCOUNTER — PATIENT MESSAGE (OUTPATIENT)
Dept: FAMILY MEDICINE | Facility: CLINIC | Age: 65
End: 2025-03-06
Payer: COMMERCIAL

## 2025-03-17 NOTE — PROGRESS NOTES
Patient ID: [id number]     Chief Complaint: Diabetes      HPI:     Nehal Araujo is a 64 y.o. female here today for follow up:   Patient is continuing to make lifestyle modifications in order to increase quality of life-very happy with her job.  Has not had any problems with Ozempic-feels that the medication is stagnant-no longer noticing decline in appetite-would like to try higher dose-did not bring blood sugars to review.  1)  Hypertension: Patient has been taking Exforge. BP is normal at home. No symptoms associated with increased BP such as headache/ visual changes/ dizziness/ chest pain.   2)  Reflux: Patient is continuing to take Prilosec 20 mg-no symptoms associated with reflux.  No noticeable side effects of medication.  3) Hyperlipidemia: Patient is taking Lipitor 40 mg. No side effects of medication noted.      Past Medical History:   Diagnosis Date    Acute idiopathic gout of multiple sites     GERD (gastroesophageal reflux disease)     History of kidney stones 2023    Lumbar degenerative disc disease 2023    Pre-diabetes 2022    Primary osteoarthritis of right knee 2023        Past Surgical History:   Procedure Laterality Date    ARTHROSCOPY,KNEE,WITH MENISCUS REPAIR Right 2023    Procedure: ARTHROSCOPY,KNEE,WITH MENISCUS REPAIR;  Surgeon: Shay Daniels Jr., MD;  Location: [facility];  Service: Orthopedics;  Laterality: Right;    BUNIONECTOMY Bilateral      SECTION      x 3    CHOLECYSTECTOMY      CYSTOURETEROSCOPY,WITH HOLMIUM LASER LITHOTRIPSY OF URETERAL CALCULUS N/A 2023    Procedure: CYSTOURETEROSCOPY,WITH HOLMIUM LASER LITHOTRIPSY OF URETERAL CALCULUS;  Surgeon: Isrrael Rachel MD;  Location: [facility] OR;  Service: Urology;  Laterality: N/A;    ENDOMETRIAL ABLATION      JOINT REPLACEMENT      Thumb    LAPAROSCOPIC PARTIAL COLECTOMY      SMALL INTESTINE SURGERY  2018    thumb surgery Left     carpal - metacarpal joint replacement    TONSILLECTOMY       URETERAL STENT PLACEMENT Right 08/11/2023    Procedure: INSERTION, STENT, URETER;  Surgeon: Isrrael Rachel MD;  Location: [facility];  Service: Urology;  Laterality: Right;        Social History[1]     Social History[2]     Current Outpatient Medications   Medication Instructions    amlodipine-valsartan (EXFORGE)  mg per tablet 1 tablet, Oral, Daily    atorvastatin (LIPITOR) 40 mg, Oral, Daily    blood sugar diagnostic (ONETOUCH VERIO TEST STRIPS) Strp 50 each, Misc.(Non-Drug; Combo Route), 2 times daily, Check glucose levels twice a day    febuxostat (ULORIC) 40 mg, Oral, Daily    lancets (ONETOUCH DELICA PLUS LANCET) 33 gauge Misc Use to check blood glucose levels twice daily    magnesium oxide (MAG-OX) 400 mg (241.3 mg magnesium) tablet Take 1 tablet by mouth once daily    meloxicam (MOBIC) 15 mg, Oral, Daily    metFORMIN (GLUCOPHAGE) 500 mg, Oral, With breakfast    multivit with calcium,iron,min (WOMEN'S MULTIPLE VITAMINS ORAL) Take by mouth.    omeprazole (PRILOSEC) 20 mg, Oral, Daily    OZEMPIC 1 mg, Subcutaneous, Every 7 days    triamcinolone (NASACORT) 55 mcg nasal inhaler 2 sprays, Nasal, Daily    triamcinolone acetonide 0.1% (KENALOG) 0.1 % cream Apply 1 application on the skin twice a day as needed; apply to arms, legs trunk       Review of patient's allergies indicates:   Allergen Reactions    Allopurinol analogues Rash    Hydrochlorothiazide Palpitations    Percocet [oxycodone-acetaminophen] Itching        Patient Care Team:  Angela Baez MD as PCP - General (Family Medicine)  Sandy Gloria as   Isrrael Rachel MD as Consulting Physician (Urology)  Urology-Referrals, Tavo Tadeo MD as Consulting Physician (Rheumatology)  Angela Joyce MD (Dermatology)  Eusebio Mckeon MD as Consulting Physician (Gastroenterology)  Tucker Wayne LPN as Care Coordinator  1, [facility] - [facility] as Hypertension Digital Medicine Contract  Gabriele Moreno,  "PharmD as Hypertension Digital Medicine Clinician (Pharmacist)  Taylor Segovia MD as Hypertension Digital Medicine Responsible Provider (Internal Medicine)       Subjective:     Review of Systems    12 point review of systems conducted, negative except as stated in the history of present illness. See HPI for details.      Objective:     Visit Vitals  /77 (BP Location: Right arm, Patient Position: Sitting)   Pulse 84   Resp 18   Ht 5' 3" (1.6 m)   Wt 76.2 kg (168 lb)   SpO2 98%   BMI 29.76 kg/m²       Physical Exam    General: Alert and oriented, No acute distress.  Head: Normocephalic, Atraumatic.  Eye: Pupils are equal, round and reactive to light, Extraocular movements are intact, Sclera non-icteric.  Ears/Nose/Throat: Normal, Mucosa moist,Clear.  Neck/Thyroid: Supple, Non-tender  Respiratory: Clear to auscultation bilaterally  Cardiovascular: Regular rate and rhythm, S1/S2 normal  Gastrointestinal: Soft, Non-tender, Non-distended, Normal bowel sounds, No palpable organomegaly.  Musculoskeletal: Normal range of motion.  Integumentary: Warm, Dry  Extremities: No clubbing, cyanosis or edema  Protective Sensation (w/ 10 gram monofilament):  Right: Intact  Left: Intact    Visual Inspection:  Normal -  Bilateral    Pedal Pulses:   Right: Present  Left: Present    Posterior Tibialis Pulses:   Right:Present  Left: Present     Neurologic: No focal deficits, Cranial Nerves II-XII are grossly intact  Psychiatric: Normal interaction, Coherent speech       Assessment:       ICD-10-CM ICD-9-CM   1. Type 2 diabetes mellitus with diabetic peripheral angiopathy without gangrene, without long-term current use of insulin  E11.51 250.70     443.81   2. Essential hypertension  I10 401.9   3. Mixed hyperlipidemia  E78.2 272.2   4. Encounter for screening mammogram for malignant neoplasm of breast  Z12.31 V76.12        Plan:     1. Type 2 diabetes mellitus with diabetic peripheral angiopathy without gangrene, without long-term " current use of insulin (Primary)  Lab Results   Component Value Date    HGBA1C 6.2 12/12/2024    HGBA1C 6.5 06/03/2024    LDL 47.00 (L) 12/12/2024    CREATININE 0.82 01/24/2025      Pathophysiology/treatment/dangers discussed.   Patient to try increasing Ozempic to 1 mg-patient made aware of risk associated with medication.  Blood sugar goal of less than 120 fasting and 140-150 about 2 hours after meal.   Current HA1C is 6.2. Hemoglobin A1c needs to be rechecked in 3 months. Goal less than 6.5.  Follow ADA Diet. Avoid soda, simple sweets, and limit rice/pasta/breads/starches (no more than 45-50 grams per meal).  Maintain healthy weight with goal BMI <30.  Exercise 5 times per week for 30 minutes per day.  Stressed importance of daily foot exams.Stressed importance of annual dilated eye exam.    - semaglutide (OZEMPIC) 1 mg/dose (4 mg/3 mL); Inject 1 mg into the skin every 7 days.  Dispense: 3 mL; Refill: 3  - CBC Auto Differential; Future  - Comprehensive Metabolic Panel; Future  - Hemoglobin A1C; Future    2. Essential hypertension  Patient has been taking medication-Exforge. Blood pressure goal of less than 130/80-blood pressure is stable. Continue to encourage diet and activity modifications. Including stress management. Pathophysiology/treatment/dangers discussed.    3. Mixed hyperlipidemia  Patient to continue Lipitor-check labs with next visit.  - Lipid Panel; Future  - TSH; Future    4. Encounter for screening mammogram for malignant neoplasm of breast  Patient to schedule mammogram.  - Mammo Digital Screening Bilat; Future    Follow up in about 6 months (around 9/20/2025) for Annual Wellness. In addition to their scheduled follow up, the patient has also been instructed to follow up on as needed basis.     Future Appointments   Date Time Provider Department Center   6/23/2025  4:00 PM Angela Baez MD [facility] Baez MD           [1]   Social History  Tobacco Use    Smoking  status: Former     Current packs/day: 0.00     Average packs/day: 0.8 packs/day for 15.0 years (11.3 ttl pk-yrs)     Types: Cigarettes     Start date: 1/1/2000     Quit date: 2015     Years since quitting: 10.2    Smokeless tobacco: Never   Substance Use Topics    Alcohol use: Yes     Alcohol/week: 5.0 standard drinks of alcohol     Types: 5 Drinks containing 0.5 oz of alcohol per week     Comment: Socially    Drug use: Never   [2]   Social History  Socioeconomic History    Marital status:      Spouse name: Hardik    Number of children: 3

## 2025-03-20 ENCOUNTER — OFFICE VISIT (OUTPATIENT)
Dept: FAMILY MEDICINE | Facility: CLINIC | Age: 65
End: 2025-03-20
Payer: COMMERCIAL

## 2025-03-20 VITALS
DIASTOLIC BLOOD PRESSURE: 77 MMHG | HEIGHT: 63 IN | BODY MASS INDEX: 29.77 KG/M2 | HEART RATE: 84 BPM | RESPIRATION RATE: 18 BRPM | WEIGHT: 168 LBS | OXYGEN SATURATION: 98 % | SYSTOLIC BLOOD PRESSURE: 126 MMHG

## 2025-03-20 DIAGNOSIS — E78.2 MIXED HYPERLIPIDEMIA: ICD-10-CM

## 2025-03-20 DIAGNOSIS — Z12.31 ENCOUNTER FOR SCREENING MAMMOGRAM FOR MALIGNANT NEOPLASM OF BREAST: ICD-10-CM

## 2025-03-20 DIAGNOSIS — E11.51 TYPE 2 DIABETES MELLITUS WITH DIABETIC PERIPHERAL ANGIOPATHY WITHOUT GANGRENE, WITHOUT LONG-TERM CURRENT USE OF INSULIN: Primary | ICD-10-CM

## 2025-03-20 DIAGNOSIS — I10 ESSENTIAL HYPERTENSION: ICD-10-CM

## 2025-03-20 PROCEDURE — 1160F RVW MEDS BY RX/DR IN RCRD: CPT | Mod: CPTII,,, | Performed by: FAMILY MEDICINE

## 2025-03-20 PROCEDURE — 3008F BODY MASS INDEX DOCD: CPT | Mod: CPTII,,, | Performed by: FAMILY MEDICINE

## 2025-03-20 PROCEDURE — 3074F SYST BP LT 130 MM HG: CPT | Mod: CPTII,,, | Performed by: FAMILY MEDICINE

## 2025-03-20 PROCEDURE — 4010F ACE/ARB THERAPY RXD/TAKEN: CPT | Mod: CPTII,,, | Performed by: FAMILY MEDICINE

## 2025-03-20 PROCEDURE — 99214 OFFICE O/P EST MOD 30 MIN: CPT | Mod: ,,, | Performed by: FAMILY MEDICINE

## 2025-03-20 PROCEDURE — 3078F DIAST BP <80 MM HG: CPT | Mod: CPTII,,, | Performed by: FAMILY MEDICINE

## 2025-03-20 PROCEDURE — 1159F MED LIST DOCD IN RCRD: CPT | Mod: CPTII,,, | Performed by: FAMILY MEDICINE

## 2025-03-20 PROCEDURE — G2211 COMPLEX E/M VISIT ADD ON: HCPCS | Mod: ,,, | Performed by: FAMILY MEDICINE

## 2025-03-20 RX ORDER — SEMAGLUTIDE 1.34 MG/ML
1 INJECTION, SOLUTION SUBCUTANEOUS
Qty: 3 ML | Refills: 3 | Status: SHIPPED | OUTPATIENT
Start: 2025-03-20 | End: 2026-03-20

## 2025-03-24 ENCOUNTER — HOSPITAL ENCOUNTER (OUTPATIENT)
Dept: RADIOLOGY | Facility: HOSPITAL | Age: 65
Discharge: HOME | End: 2025-03-24
Attending: FAMILY MEDICINE
Payer: COMMERCIAL

## 2025-03-24 DIAGNOSIS — Z12.31 ENCOUNTER FOR SCREENING MAMMOGRAM FOR MALIGNANT NEOPLASM OF BREAST: ICD-10-CM

## 2025-03-24 PROCEDURE — 77063 BREAST TOMOSYNTHESIS BI: CPT | Mod: TC

## 2025-03-31 ENCOUNTER — RESULTS FOLLOW-UP (OUTPATIENT)
Dept: FAMILY MEDICINE | Facility: CLINIC | Age: 65
End: 2025-03-31

## 2025-04-07 DIAGNOSIS — K21.9 GASTROESOPHAGEAL REFLUX DISEASE, UNSPECIFIED WHETHER ESOPHAGITIS PRESENT: ICD-10-CM

## 2025-04-07 RX ORDER — OMEPRAZOLE 20 MG/1
20 CAPSULE, DELAYED RELEASE ORAL DAILY
Qty: 30 CAPSULE | Refills: 3 | Status: SHIPPED | OUTPATIENT
Start: 2025-04-07

## 2025-04-13 DIAGNOSIS — E11.51 TYPE 2 DIABETES MELLITUS WITH DIABETIC PERIPHERAL ANGIOPATHY WITHOUT GANGRENE, WITHOUT LONG-TERM CURRENT USE OF INSULIN: ICD-10-CM

## 2025-04-14 RX ORDER — SEMAGLUTIDE 1.34 MG/ML
1 INJECTION, SOLUTION SUBCUTANEOUS
Qty: 3 ML | Refills: 3 | Status: SHIPPED | OUTPATIENT
Start: 2025-04-14 | End: 2026-04-14

## 2025-04-24 ENCOUNTER — PATIENT MESSAGE (OUTPATIENT)
Facility: CLINIC | Age: 65
End: 2025-04-24
Payer: COMMERCIAL

## 2025-05-12 DIAGNOSIS — M10.09 ACUTE IDIOPATHIC GOUT OF MULTIPLE SITES: ICD-10-CM

## 2025-05-12 RX ORDER — FEBUXOSTAT 40 MG/1
40 TABLET, FILM COATED ORAL DAILY
Qty: 90 TABLET | Refills: 1 | Status: SHIPPED | OUTPATIENT
Start: 2025-05-12

## 2025-05-19 ENCOUNTER — OFFICE VISIT (OUTPATIENT)
Dept: ORTHOPEDICS | Facility: CLINIC | Age: 65
End: 2025-05-19
Payer: COMMERCIAL

## 2025-05-19 ENCOUNTER — HOSPITAL ENCOUNTER (OUTPATIENT)
Dept: RADIOLOGY | Facility: CLINIC | Age: 65
Discharge: HOME | End: 2025-05-19
Attending: PHYSICIAN ASSISTANT
Payer: COMMERCIAL

## 2025-05-19 DIAGNOSIS — S92.352D CLOSED FRACTURE OF BASE OF FIFTH METATARSAL BONE OF LEFT FOOT WITH ROUTINE HEALING: ICD-10-CM

## 2025-05-19 DIAGNOSIS — M79.672 LEFT FOOT PAIN: Primary | ICD-10-CM

## 2025-05-19 DIAGNOSIS — M79.672 LEFT FOOT PAIN: ICD-10-CM

## 2025-05-19 DIAGNOSIS — M21.6X2 ACQUIRED PRONATION OF LEFT FOOT: ICD-10-CM

## 2025-05-19 PROCEDURE — 99213 OFFICE O/P EST LOW 20 MIN: CPT | Mod: ,,, | Performed by: PHYSICIAN ASSISTANT

## 2025-05-19 PROCEDURE — 1159F MED LIST DOCD IN RCRD: CPT | Mod: CPTII,,, | Performed by: PHYSICIAN ASSISTANT

## 2025-05-19 PROCEDURE — 73630 X-RAY EXAM OF FOOT: CPT | Mod: LT,,, | Performed by: PHYSICIAN ASSISTANT

## 2025-05-19 PROCEDURE — 1160F RVW MEDS BY RX/DR IN RCRD: CPT | Mod: CPTII,,, | Performed by: PHYSICIAN ASSISTANT

## 2025-05-19 PROCEDURE — 4010F ACE/ARB THERAPY RXD/TAKEN: CPT | Mod: CPTII,,, | Performed by: PHYSICIAN ASSISTANT

## 2025-05-19 RX ORDER — MELOXICAM 15 MG/1
15 TABLET ORAL DAILY
Qty: 30 TABLET | Refills: 1 | Status: SHIPPED | OUTPATIENT
Start: 2025-05-19

## 2025-05-19 NOTE — PROGRESS NOTES
Chief Complaint:   Chief Complaint   Patient presents with    Follow-up     L foot - states she continues to have pain / swelling. Reports a nagging pain. Has been taking mobic unsure if it helped. Has tried cold compress.        History of present illness:    This is a 64 y.o. year old   History of Present Illness    CHIEF COMPLAINT:  - Left foot pain and swelling    HPI:  Nehal presents for follow-up of persistent foot pain. She reports pain across the top of her foot with abnormal protrusions not present on her other foot. Pain is exacerbated by walking and persists at night, disturbing her sleep. She reports foot pain in her dreams due to the discomfort. Pain is described as encompassing with swelling, particularly on the side of her foot. She recently traveled to [place] for work, which involved extensive walking, causing increased pain. She notes her foot is swollen almost constantly.    She had a previous fracture in this foot, which has now healed according to recent XRs. She also mentions a history of bunion surgery in 1994. For pain management, she has been using Maalox, noting that she took it before her [place] trip, but it provided limited relief. She reports difficulty finding comfortable shoes due to the swelling and pain.    She expresses concern about upcoming conferences in [place] and another location, which will involve significant walking. She denies any history of severe foot pronation requiring an [place] boot. 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    atorvastatin (LIPITOR) 40 MG tablet Take 1 tablet (40 mg total) by mouth once daily.    febuxostat (ULORIC) 40 mg Tab Take 1 tablet (40 mg total) by mouth once daily.    multivit with calcium,iron,min (WOMEN'S MULTIPLE VITAMINS ORAL) Take by mouth.    omeprazole (PRILOSEC) 20 MG capsule Take 1 capsule (20 mg total) by mouth once daily.    semaglutide (OZEMPIC) 1 mg/dose (4 mg/3 mL) Inject 1 mg into the skin every 7 days.    triamcinolone acetonide 0.1% (KENALOG) 0.1 % cream Apply 1 application on the skin twice a day as needed; apply to arms, legs trunk    blood sugar diagnostic (ONETOUCH VERIO TEST STRIPS) Strp 50 each by Misc.(Non-Drug; Combo Route) route 2 (two) times a day. Check glucose levels twice a day    lancets (ONETOUCH DELICA PLUS LANCET) 33 gauge Misc Use to check blood glucose levels twice daily    meloxicam (MOBIC) 15 MG tablet Take 1 tablet (15 mg total) by mouth once daily. (Patient not taking: Reported on 5/19/2025)    triamcinolone (NASACORT) 55 mcg nasal inhaler 2 sprays by Nasal route once daily.     No current facility-administered medications for this visit.       Review of Systems:    Constitution:   Denies chills, fever, and sweats.  HENT:   Denies headaches or blurry vision.  Cardiovascular:  Denies chest pain or irregular heart beat.  Respiratory:   Denies cough or shortness of breath.  Gastrointestinal:  Denies abdominal pain, nausea, or vomiting.  Musculoskeletal:   Denies muscle cramps.  Neurological:   Denies dizziness or focal weakness.  Psychiatric/Behavior: Normal mental status.  Hematology/Lymph:  Denies bleeding problem  "or easy bruising/bleeding.  Skin:    Denies rash or suspicious lesions.    Examination:    Vital Signs:    Vitals:    05/19/25 1542   Weight: (P) 76.2 kg (167 lb 15.9 oz)   Height: (P) 5' 3" (1.6 m)       Body mass index is 29.76 kg/m² (pended).    Constitution:   Well-developed, well nourished patient in no acute distress.  Neurological:   Alert and oriented x 3 and cooperative to examination.     Psychiatric/Behavior: Normal mental status.  Respiratory:   No shortness of breath.  Eyes:    Extraoccular muscles intact  Skin:    No scars, rash or suspicious lesions.    Physical Exam:   Left foot and ankle  No obvious deformity. Plantar flexion and dorsiflexion is 60 degrees and 30 degrees, respectively.  Negative squeeze test.  Negative lateral malleolus tenderness.  Negative medial malleolus tenderness.  Negative talofibular ligament tenderness.  Negative anterior draw and lateral tilt.  5/5 strength, normal skin appearance and palpable pulses distally.  Sensibility normal.  No tenderness over the base of the 5th metatarsal.    Patient on weight-bearing does have a pronated foot with a collapsing arch.    Range of motion she has intact dorsiflexion and plantar flexion.    With eversion I can reproduce her symptoms in the lateral aspect of the midfoot and ankle    Imaging: X-rays ordered and images interpreted today personally by me of left foot three views.    Patient has a healed fracture of the base of the 5th metatarsal.    She does have some pes planus and collapse of the midfoot with some arthritic changes no acute osseous abnormalities noted today        Assessment: Left foot pain  -     X-Ray Foot Complete Left; Future; Expected date: 05/19/2025    Acquired pronation of left foot  -     MRI Midfoot WO Contrast LT; Future; Expected date: 05/19/2025    Closed fracture of base of fifth metatarsal bone of left foot with routine healing  -     MRI Midfoot WO Contrast LT; Future; Expected date: 05/19/2025     "     Plan:    Assessment & Plan    FLAT FOOT (PES PLANUS) AND FOOT PAIN:  - Discussed potential foot reconstruction. Nehal declines surgical intervention at this time.  - Use shoes with good arch support or arch support inserts.  - Consider purchasing running shoes or Sketchers with built-in arch support (Archlock line).  - Ordered MRI Foot to evaluate for potential torn ligaments or other issues.    EDEMA:  - Use compression socks for foot swelling.      FOLLOW-UP:  - Follow up after imaging study.                This note was generated with the assistance of ambient listening technology. Verbal consent was obtained by the patient and accompanying visitor(s) for the recording of patient appointment to facilitate this note. I attest to having reviewed and edited the generated note for accuracy, though some syntax or spelling errors may persist. Please contact the author of this note for any clarification.       DISCLAIMER: This note may have been dictated using voice recognition software and may contain grammatical errors.     NOTE: Consult report sent to referring provider via [facility] [employer].

## 2025-05-27 ENCOUNTER — PATIENT OUTREACH (OUTPATIENT)
Facility: CLINIC | Age: 65
End: 2025-05-27
Payer: COMMERCIAL

## 2025-05-31 DIAGNOSIS — E78.2 MIXED HYPERLIPIDEMIA: Chronic | ICD-10-CM

## 2025-06-02 RX ORDER — ATORVASTATIN CALCIUM 40 MG/1
40 TABLET, FILM COATED ORAL DAILY
Qty: 90 TABLET | Refills: 1 | Status: SHIPPED | OUTPATIENT
Start: 2025-06-02 | End: 2026-06-02

## 2025-06-03 ENCOUNTER — PATIENT MESSAGE (OUTPATIENT)
Dept: FAMILY MEDICINE | Facility: CLINIC | Age: 65
End: 2025-06-03
Payer: COMMERCIAL

## 2025-06-30 ENCOUNTER — LAB VISIT (OUTPATIENT)
Dept: LAB | Facility: HOSPITAL | Age: 65
End: 2025-06-30
Attending: FAMILY MEDICINE
Payer: COMMERCIAL

## 2025-06-30 DIAGNOSIS — Z87.442 HISTORY OF KIDNEY STONES: ICD-10-CM

## 2025-06-30 DIAGNOSIS — R73.03 PRE-DIABETES: Chronic | ICD-10-CM

## 2025-06-30 DIAGNOSIS — M10.09 ACUTE IDIOPATHIC GOUT OF MULTIPLE SITES: ICD-10-CM

## 2025-06-30 DIAGNOSIS — E11.51 TYPE 2 DIABETES MELLITUS WITH DIABETIC PERIPHERAL ANGIOPATHY WITHOUT GANGRENE, WITHOUT LONG-TERM CURRENT USE OF INSULIN: ICD-10-CM

## 2025-06-30 DIAGNOSIS — E78.2 MIXED HYPERLIPIDEMIA: ICD-10-CM

## 2025-06-30 LAB
ALBUMIN SERPL-MCNC: 4.2 G/DL (ref 3.4–4.8)
ALBUMIN/CREAT UR: 9 MG/GM CR (ref 0–30)
ALBUMIN/GLOB SERPL: 1 RATIO (ref 1.1–2)
ALP SERPL-CCNC: 83 UNIT/L (ref 40–150)
ALT SERPL-CCNC: 47 UNIT/L (ref 0–55)
ANION GAP SERPL CALC-SCNC: 9 MEQ/L
AST SERPL-CCNC: 37 UNIT/L (ref 11–45)
BASOPHILS # BLD AUTO: 0.06 X10(3)/MCL
BASOPHILS NFR BLD AUTO: 1.1 %
BILIRUB SERPL-MCNC: 0.5 MG/DL
BUN SERPL-MCNC: 14 MG/DL (ref 9.8–20.1)
CALCIUM SERPL-MCNC: 9.8 MG/DL (ref 8.4–10.2)
CHLORIDE SERPL-SCNC: 104 MMOL/L (ref 98–107)
CHOLEST SERPL-MCNC: 149 MG/DL
CHOLEST/HDLC SERPL: 3 {RATIO} (ref 0–5)
CO2 SERPL-SCNC: 29 MMOL/L (ref 23–31)
CREAT SERPL-MCNC: 0.76 MG/DL (ref 0.55–1.02)
CREAT UR-MCNC: 155.1 MG/DL (ref 45–106)
CREAT/UREA NIT SERPL: 18
EOSINOPHIL # BLD AUTO: 0.12 X10(3)/MCL (ref 0–0.9)
EOSINOPHIL NFR BLD AUTO: 2.2 %
ERYTHROCYTE [DISTWIDTH] IN BLOOD BY AUTOMATED COUNT: 11.9 % (ref 11.5–17)
EST. AVERAGE GLUCOSE BLD GHB EST-MCNC: 116.9 MG/DL
GFR SERPLBLD CREATININE-BSD FMLA CKD-EPI: >60 ML/MIN/1.73/M2
GLOBULIN SER-MCNC: 4.3 GM/DL (ref 2.4–3.5)
GLUCOSE SERPL-MCNC: 121 MG/DL (ref 82–115)
HBA1C MFR BLD: 5.7 %
HCT VFR BLD AUTO: 39.1 % (ref 37–47)
HDLC SERPL-MCNC: 54 MG/DL (ref 35–60)
HGB BLD-MCNC: 13.4 G/DL (ref 12–16)
IMM GRANULOCYTES # BLD AUTO: 0.02 X10(3)/MCL (ref 0–0.04)
IMM GRANULOCYTES NFR BLD AUTO: 0.4 %
LDLC SERPL CALC-MCNC: 61 MG/DL (ref 50–140)
LYMPHOCYTES # BLD AUTO: 2.22 X10(3)/MCL (ref 0.6–4.6)
LYMPHOCYTES NFR BLD AUTO: 39.9 %
MCH RBC QN AUTO: 31.9 PG (ref 27–31)
MCHC RBC AUTO-ENTMCNC: 34.3 G/DL (ref 33–36)
MCV RBC AUTO: 93.1 FL (ref 80–94)
MICROALBUMIN UR-MCNC: 14 UG/ML
MONOCYTES # BLD AUTO: 0.46 X10(3)/MCL (ref 0.1–1.3)
MONOCYTES NFR BLD AUTO: 8.3 %
NEUTROPHILS # BLD AUTO: 2.68 X10(3)/MCL (ref 2.1–9.2)
NEUTROPHILS NFR BLD AUTO: 48.1 %
NRBC BLD AUTO-RTO: 0 %
PLATELET # BLD AUTO: 268 X10(3)/MCL (ref 130–400)
PMV BLD AUTO: 9.2 FL (ref 7.4–10.4)
POTASSIUM SERPL-SCNC: 4.9 MMOL/L (ref 3.5–5.1)
PROT SERPL-MCNC: 8.5 GM/DL (ref 5.8–7.6)
RBC # BLD AUTO: 4.2 X10(6)/MCL (ref 4.2–5.4)
SODIUM SERPL-SCNC: 142 MMOL/L (ref 136–145)
TRIGL SERPL-MCNC: 169 MG/DL (ref 37–140)
TSH SERPL-ACNC: 1.23 UIU/ML (ref 0.35–4.94)
URATE SERPL-MCNC: 2.8 MG/DL (ref 2.6–6)
VLDLC SERPL CALC-MCNC: 34 MG/DL
WBC # BLD AUTO: 5.56 X10(3)/MCL (ref 4.5–11.5)

## 2025-06-30 PROCEDURE — 83036 HEMOGLOBIN GLYCOSYLATED A1C: CPT

## 2025-06-30 PROCEDURE — 36415 COLL VENOUS BLD VENIPUNCTURE: CPT

## 2025-06-30 PROCEDURE — 80053 COMPREHEN METABOLIC PANEL: CPT

## 2025-06-30 PROCEDURE — 84443 ASSAY THYROID STIM HORMONE: CPT

## 2025-06-30 PROCEDURE — 84550 ASSAY OF BLOOD/URIC ACID: CPT

## 2025-06-30 PROCEDURE — 85025 COMPLETE CBC W/AUTO DIFF WBC: CPT

## 2025-06-30 PROCEDURE — 82570 ASSAY OF URINE CREATININE: CPT

## 2025-06-30 PROCEDURE — 80061 LIPID PANEL: CPT

## 2025-07-01 ENCOUNTER — OFFICE VISIT (OUTPATIENT)
Dept: FAMILY MEDICINE | Facility: CLINIC | Age: 65
End: 2025-07-01
Payer: COMMERCIAL

## 2025-07-01 VITALS
HEART RATE: 93 BPM | HEIGHT: 63 IN | RESPIRATION RATE: 16 BRPM | WEIGHT: 166.63 LBS | BODY MASS INDEX: 29.52 KG/M2 | SYSTOLIC BLOOD PRESSURE: 127 MMHG | DIASTOLIC BLOOD PRESSURE: 74 MMHG | OXYGEN SATURATION: 98 %

## 2025-07-01 DIAGNOSIS — E11.51 TYPE 2 DIABETES MELLITUS WITH DIABETIC PERIPHERAL ANGIOPATHY WITHOUT GANGRENE, WITHOUT LONG-TERM CURRENT USE OF INSULIN: Primary | ICD-10-CM

## 2025-07-01 DIAGNOSIS — I10 ESSENTIAL HYPERTENSION: ICD-10-CM

## 2025-07-01 DIAGNOSIS — E78.2 MIXED HYPERLIPIDEMIA: ICD-10-CM

## 2025-07-01 DIAGNOSIS — K21.9 GASTROESOPHAGEAL REFLUX DISEASE WITHOUT ESOPHAGITIS: ICD-10-CM

## 2025-07-01 DIAGNOSIS — Z00.00 WELLNESS EXAMINATION: ICD-10-CM

## 2025-07-01 DIAGNOSIS — M10.09 ACUTE IDIOPATHIC GOUT OF MULTIPLE SITES: ICD-10-CM

## 2025-07-01 PROCEDURE — 3066F NEPHROPATHY DOC TX: CPT | Mod: CPTII,,, | Performed by: FAMILY MEDICINE

## 2025-07-01 PROCEDURE — 3044F HG A1C LEVEL LT 7.0%: CPT | Mod: CPTII,,, | Performed by: FAMILY MEDICINE

## 2025-07-01 PROCEDURE — 4010F ACE/ARB THERAPY RXD/TAKEN: CPT | Mod: CPTII,,, | Performed by: FAMILY MEDICINE

## 2025-07-01 PROCEDURE — G2211 COMPLEX E/M VISIT ADD ON: HCPCS | Mod: ,,, | Performed by: FAMILY MEDICINE

## 2025-07-01 PROCEDURE — 99214 OFFICE O/P EST MOD 30 MIN: CPT | Mod: ,,, | Performed by: FAMILY MEDICINE

## 2025-07-01 PROCEDURE — 3078F DIAST BP <80 MM HG: CPT | Mod: CPTII,,, | Performed by: FAMILY MEDICINE

## 2025-07-01 PROCEDURE — 1159F MED LIST DOCD IN RCRD: CPT | Mod: CPTII,,, | Performed by: FAMILY MEDICINE

## 2025-07-01 PROCEDURE — 3074F SYST BP LT 130 MM HG: CPT | Mod: CPTII,,, | Performed by: FAMILY MEDICINE

## 2025-07-01 PROCEDURE — 3008F BODY MASS INDEX DOCD: CPT | Mod: CPTII,,, | Performed by: FAMILY MEDICINE

## 2025-07-01 PROCEDURE — 1160F RVW MEDS BY RX/DR IN RCRD: CPT | Mod: CPTII,,, | Performed by: FAMILY MEDICINE

## 2025-07-01 PROCEDURE — 3061F NEG MICROALBUMINURIA REV: CPT | Mod: CPTII,,, | Performed by: FAMILY MEDICINE

## 2025-07-01 RX ORDER — SEMAGLUTIDE 2.68 MG/ML
2 INJECTION, SOLUTION SUBCUTANEOUS
Qty: 3 ML | Refills: 5 | Status: SHIPPED | OUTPATIENT
Start: 2025-07-01 | End: 2025-12-28

## 2025-07-01 NOTE — PROGRESS NOTES
Patient ID: [id number]     Chief Complaint: Diabetes      HPI:     Nehal Araujo is a 64 y.o. female here today for follow up:   Patient is continuing to make lifestyle modifications in order to increase quality of life-very happy with her job.  Has not had any problems with Ozempic-feels that the medication is stagnant-no longer noticing decline in appetite-would like to try higher dose-did not bring blood sugars to review.  1)  Hypertension: Patient has been taking Exforge. BP is normal at home. No symptoms associated with increased BP such as headache/ visual changes/ dizziness/ chest pain.   2)  Reflux: Patient is continuing to take Prilosec 20 mg-no symptoms associated with reflux.  No noticeable side effects of medication.  3) Hyperlipidemia: Patient is taking Lipitor 40 mg. No side effects of medication noted.      Past Medical History:   Diagnosis Date    Acute idiopathic gout of multiple sites     GERD (gastroesophageal reflux disease)     History of kidney stones 2023    Lumbar degenerative disc disease 2023    Pre-diabetes 2022    Primary osteoarthritis of right knee 2023        Past Surgical History:   Procedure Laterality Date    ARTHROSCOPY,KNEE,WITH MENISCUS REPAIR Right 2023    Procedure: ARTHROSCOPY,KNEE,WITH MENISCUS REPAIR;  Surgeon: Shay Daniels Jr., MD;  Location: [facility];  Service: Orthopedics;  Laterality: Right;    BUNIONECTOMY Bilateral      SECTION      x 3    CHOLECYSTECTOMY      CYSTOURETEROSCOPY,WITH HOLMIUM LASER LITHOTRIPSY OF URETERAL CALCULUS N/A 2023    Procedure: CYSTOURETEROSCOPY,WITH HOLMIUM LASER LITHOTRIPSY OF URETERAL CALCULUS;  Surgeon: Isrrael Rachel MD;  Location: [facility] OR;  Service: Urology;  Laterality: N/A;    ENDOMETRIAL ABLATION      JOINT REPLACEMENT      Thumb    LAPAROSCOPIC PARTIAL COLECTOMY      SMALL INTESTINE SURGERY  2018    thumb surgery Left     carpal - metacarpal joint replacement    TONSILLECTOMY       URETERAL STENT PLACEMENT Right 08/11/2023    Procedure: INSERTION, STENT, URETER;  Surgeon: Isrrael Rachel MD;  Location: [facility];  Service: Urology;  Laterality: Right;        Social History[1]     Social History[2]     Current Outpatient Medications   Medication Instructions    amlodipine-valsartan (EXFORGE)  mg per tablet 1 tablet, Oral, Daily    atorvastatin (LIPITOR) 40 mg, Oral, Daily    blood sugar diagnostic (ONETOUCH VERIO TEST STRIPS) Strp 50 each, Misc.(Non-Drug; Combo Route), 2 times daily, Check glucose levels twice a day    febuxostat (ULORIC) 40 mg, Oral, Daily    lancets (ONETOUCH DELICA PLUS LANCET) 33 gauge Misc Use to check blood glucose levels twice daily    meloxicam (MOBIC) 15 mg, Oral, Daily    meloxicam (MOBIC) 15 mg, Oral, Daily, Take with food    multivit with calcium,iron,min (WOMEN'S MULTIPLE VITAMINS ORAL) Take by mouth.    omeprazole (PRILOSEC) 20 mg, Oral, Daily    OZEMPIC 2 mg, Subcutaneous, Every 7 days    triamcinolone (NASACORT) 55 mcg nasal inhaler 2 sprays, Nasal, Daily    triamcinolone acetonide 0.1% (KENALOG) 0.1 % cream Apply 1 application on the skin twice a day as needed; apply to arms, legs trunk       Review of patient's allergies indicates:   Allergen Reactions    Allopurinol analogues Rash    Hydrochlorothiazide Palpitations    Percocet [oxycodone-acetaminophen] Itching        Patient Care Team:  Angela Baez MD as PCP - General (Family Medicine)  Sandy Gloria as   Isrrael Rachel MD as Consulting Physician (Urology)  Urology-Referrals, [facility]  Tavo Casper MD as Consulting Physician (Rheumatology)  Angela Joyce MD (Dermatology)  Eusebio Mckeon MD as Consulting Physician (Gastroenterology)  Tucker Wayne LPN as Care Coordinator  1, [employer] [facility] Digital Medicine Contract  Gabriele Moreno, PharmD as Hypertension Digital Medicine Clinician (Pharmacist)  Taylor Segovia MD as  "Hypertension Digital Medicine Responsible Provider (Internal Medicine)       Subjective:     Review of Systems    12 point review of systems conducted, negative except as stated in the history of present illness. See HPI for details.      Objective:     Visit Vitals  /74 (BP Location: Left arm, Patient Position: Sitting)   Pulse 93   Resp 16   Ht 5' 3" (1.6 m)   Wt 75.6 kg (166 lb 9.6 oz)   SpO2 98%   BMI 29.51 kg/m²       Physical Exam    General: Alert and oriented, No acute distress.  Head: Normocephalic, Atraumatic.  Eye: Pupils are equal, round and reactive to light, Extraocular movements are intact, Sclera non-icteric.  Ears/Nose/Throat: Normal, Mucosa moist,Clear.  Neck/Thyroid: Supple, Non-tender  Respiratory: Clear to auscultation bilaterally  Cardiovascular: Regular rate and rhythm, S1/S2 normal  Gastrointestinal: Soft, Non-tender, Non-distended, Normal bowel sounds, No palpable organomegaly.  Musculoskeletal: Normal range of motion.  Integumentary: Warm, Dry  Extremities: No edema  Protective Sensation (w/ 10 gram monofilament):  Right: Intact  Left: Intact    Visual Inspection:  Normal -  Bilateral    Pedal Pulses:   Right: Present  Left: Present    Posterior Tibialis Pulses:   Right:Present  Left: Present     Neurologic: No focal deficits, Cranial Nerves II-XII are grossly intact  Psychiatric: Normal interaction, Coherent speech       Assessment:       ICD-10-CM ICD-9-CM   1. Type 2 diabetes mellitus with diabetic peripheral angiopathy without gangrene, without long-term current use of insulin  E11.51 250.70     443.81   2. Essential hypertension  I10 401.9   3. Mixed hyperlipidemia  E78.2 272.2   4. Gastroesophageal reflux disease without esophagitis  K21.9 530.81   5. Acute idiopathic gout of multiple sites  M10.09 274.01        Plan:     1. Type 2 diabetes mellitus with diabetic peripheral angiopathy without gangrene, without long-term current use of insulin (Primary)  Lab Results   Component " Value Date    HGBA1C 5.7 06/30/2025    HGBA1C 6.2 12/12/2024    LDL 61.00 06/30/2025    CREATININE 0.76 06/30/2025      Pathophysiology/treatment/dangers discussed.   Patient to increase dosage of medication to 2 mg.  Blood sugar goal of less than 120 fasting and 140-150 about 2 hours after meal.   Current HA1C is 5.7 . Hemoglobin A1c needs to be rechecked in 6 months. Goal less than 6.5.  Follow ADA Diet. Avoid soda, simple sweets, and limit rice/pasta/breads/starches (no more than 45-50 grams per meal).  Maintain healthy weight with goal BMI <30.  Exercise 5 times per week for 30 minutes per day.  Stressed importance of daily foot exams.Stressed importance of annual dilated eye exam.   - semaglutide (OZEMPIC) 2 mg/dose (8 mg/3 mL) PnIj; Inject 2 mg into the skin every 7 days.  Dispense: 3 mL; Refill: 5    2. Essential hypertension  Patient has been taking medication- Exforge 10-320mg. Blood pressure goal of less than 130/80-blood pressure is stable. Continue to encourage diet and activity modifications. Including stress management. Pathophysiology/treatment/dangers discussed.    3. Mixed hyperlipidemia  Lab Results   Component Value Date    CHOL 149 06/30/2025    LDL 61.00 06/30/2025    TRIG 169 (H) 06/30/2025    HDL 54 06/30/2025     Pathophysiology/treatment/dangers discussed. Patient to continue diet modification-Lipitor 40mg- Coq 10 200mg.   Total cholesterol 149 ( Goal less than 200) HDL 54 ( Goal high as possible) LDL 61 ( Goal less than 100) Triglycerides 169 ( Goal less than 150)     4. Gastroesophageal reflux disease without esophagitis  Pathophysiology/treatment/dangers discussed.Patient is taking Omeprazole 20mg-made aware of risk associated with medication.  For this patient benefits outweigh the risk.    5. Acute idiopathic gout of multiple sites  Patient is currently stable.  No acute modifications recommended.  Continue with current treatment-Uloric.    6. Wellness examination  Patient given lab  orders to be completed before wellness visit.   - CBC Auto Differential; Future  - Comprehensive Metabolic Panel; Future  - Lipid Panel; Future  - TSH; Future  - Hemoglobin A1C; Future  - Urinalysis, Reflex to Urine Culture Urine, Clean Catch; Future  - Microalbumin/Creatinine Ratio, Urine; Future    Follow up in about 6 months (around 1/1/2026) for Annual Wellness. In addition to their scheduled follow up, the patient has also been instructed to follow up on as needed basis.     Future Appointments   Date Time Provider Department Center   1/7/2026 12:00 PM Angela Baez MD [facility]        Angela Baez MD           [1]   Social History  Tobacco Use    Smoking status: Former     Current packs/day: 0.00     Average packs/day: 0.8 packs/day for 15.0 years (11.3 ttl pk-yrs)     Types: Cigarettes     Start date: 1/1/2000     Quit date: 2015     Years since quitting: 10.5    Smokeless tobacco: Never   Substance Use Topics    Alcohol use: Yes     Alcohol/week: 5.0 standard drinks of alcohol     Types: 5 Drinks containing 0.5 oz of alcohol per week     Comment: Socially    Drug use: Never   [2]   Social History  Socioeconomic History    Marital status:      Spouse name: Hardik    Number of children: 3

## 2025-07-02 DIAGNOSIS — K21.9 GASTROESOPHAGEAL REFLUX DISEASE, UNSPECIFIED WHETHER ESOPHAGITIS PRESENT: ICD-10-CM

## 2025-07-02 RX ORDER — OMEPRAZOLE 20 MG/1
20 CAPSULE, DELAYED RELEASE ORAL DAILY
Qty: 30 CAPSULE | Refills: 3 | Status: SHIPPED | OUTPATIENT
Start: 2025-07-02

## (undated) DEVICE — SPONGE DERMACEA 4X4IN 12PLY

## (undated) DEVICE — SET IV EXT GENERAL 4.9ML 30IN

## (undated) DEVICE — DRAPE T CYSTOSCOPY STERILE

## (undated) DEVICE — SYR 0.9% NACL 10ML STERILE

## (undated) DEVICE — SUPPORT ULNA NERVE PROTECTOR

## (undated) DEVICE — BANDAGE ACE ELASTIC 6"

## (undated) DEVICE — PADDING CAST SOFT-ROLL 6 X 4

## (undated) DEVICE — GOWN POLY REINF BRTH SLV XL

## (undated) DEVICE — BASKET STONE RETRV 1.9FRX120CM

## (undated) DEVICE — GAUZE SPONGE 4X4 12PLY

## (undated) DEVICE — DRAPE LEGGINGS CUFF 33X51IN

## (undated) DEVICE — FIBER LASER HOLMIUM 365 MICRON

## (undated) DEVICE — BLADE SHAVER LANZA 4.2X13CM

## (undated) DEVICE — CONNECTOR CLEAR POSITVIVE PRES

## (undated) DEVICE — COVER TABLE 44X90 STERILE

## (undated) DEVICE — CATH URTRL OPN END STR TP 6F

## (undated) DEVICE — SUT MONOCRYL 2-0 S UND

## (undated) DEVICE — GLOVE PROTEXIS HYDROGEL SZ8

## (undated) DEVICE — SUT 3-0 MONOCRYL PLUS PS-2

## (undated) DEVICE — CONTRAST ISOVUE 300 50ML

## (undated) DEVICE — CLOSURE SKIN STERI STRIP 1/2X4

## (undated) DEVICE — PEROXIDE HYDROGEN 3% 16OZ

## (undated) DEVICE — GOWN POLY REINF X-LONG 2XL

## (undated) DEVICE — TUBE SET INFLOW/OUTFLOW

## (undated) DEVICE — SOL NACL IRR 3000ML

## (undated) DEVICE — GLOVE PROTEXIS LTX MICRO 8

## (undated) DEVICE — WIRE GUIDE 0.038OLD

## (undated) DEVICE — GLOVE PROTEXIS HYDROGEL SZ6

## (undated) DEVICE — PASSPORT BUTTON CANNULA

## (undated) DEVICE — GLOVE PROTEXIS BLUE LATEX 6.5

## (undated) DEVICE — DRAPE FULL SHEET 70X100IN

## (undated) DEVICE — NDL MAGELLAN SAFETY 18G 1.5IN

## (undated) DEVICE — CONTAINER SPECIMEN SCREW 4OZ

## (undated) DEVICE — CUFF TOURNIQUET STER DIS 34

## (undated) DEVICE — SET CYSTO IRR DRP CHMBR 84IN

## (undated) DEVICE — APPLICATOR CHLORAPREP ORN 26ML

## (undated) DEVICE — TRAY SKIN SCRUB WET PREMIUM

## (undated) DEVICE — POSITIONER HEAD ADULT

## (undated) DEVICE — PACK SURGICAL KNEE SCOPE

## (undated) DEVICE — BLADE SURG STAINLESS STEEL #15

## (undated) DEVICE — PAD PREP CUFFED NS 24X48IN

## (undated) DEVICE — FIBER HOLMIUM LASER RED 273UM

## (undated) DEVICE — SYR 10CC LUER LOCK

## (undated) DEVICE — NDL SAFETY 21G X 1 1/2 ECLPSE

## (undated) DEVICE — GLOVE PROTEXIS HYDROGEL SZ7.5

## (undated) DEVICE — SOL IRRI STRL WATER 1000ML